# Patient Record
Sex: FEMALE | Race: WHITE | NOT HISPANIC OR LATINO | Employment: FULL TIME | URBAN - METROPOLITAN AREA
[De-identification: names, ages, dates, MRNs, and addresses within clinical notes are randomized per-mention and may not be internally consistent; named-entity substitution may affect disease eponyms.]

---

## 2022-12-02 ENCOUNTER — HOSPITAL ENCOUNTER (INPATIENT)
Facility: HOSPITAL | Age: 42
LOS: 3 days | Discharge: HOME | DRG: 872 | End: 2022-12-05
Attending: OBSTETRICS & GYNECOLOGY | Admitting: OBSTETRICS & GYNECOLOGY
Payer: OTHER GOVERNMENT

## 2022-12-02 DIAGNOSIS — N70.93 TOA (TUBO-OVARIAN ABSCESS): Primary | ICD-10-CM

## 2022-12-02 PROBLEM — K56.7 ILEUS, UNSPECIFIED: Status: ACTIVE | Noted: 2022-12-02

## 2022-12-02 PROBLEM — I82.422 ACUTE DEEP VEIN THROMBOSIS (DVT) OF ILIAC VEIN OF LEFT LOWER EXTREMITY: Status: ACTIVE | Noted: 2022-12-02

## 2022-12-02 PROBLEM — A41.9 SEPSIS WITHOUT ACUTE ORGAN DYSFUNCTION: Status: ACTIVE | Noted: 2022-12-02

## 2022-12-02 PROBLEM — R03.0 ELEVATED BLOOD PRESSURE READING: Status: ACTIVE | Noted: 2022-12-02

## 2022-12-02 PROBLEM — E87.6 HYPOKALEMIA: Status: ACTIVE | Noted: 2022-12-02

## 2022-12-02 PROBLEM — T83.32XA IUD MIGRATION: Status: ACTIVE | Noted: 2022-12-02

## 2022-12-02 LAB
ALBUMIN SERPL BCP-MCNC: 1.7 G/DL (ref 3.5–5.2)
ALP SERPL-CCNC: 122 U/L (ref 55–135)
ALT SERPL W/O P-5'-P-CCNC: 11 U/L (ref 10–44)
ANION GAP SERPL CALC-SCNC: 12 MMOL/L (ref 8–16)
AST SERPL-CCNC: 11 U/L (ref 10–40)
BASOPHILS NFR BLD: 0 % (ref 0–1.9)
BILIRUB SERPL-MCNC: 0.4 MG/DL (ref 0.1–1)
BUN SERPL-MCNC: 13 MG/DL (ref 6–20)
CALCIUM SERPL-MCNC: 8.4 MG/DL (ref 8.7–10.5)
CHLORIDE SERPL-SCNC: 98 MMOL/L (ref 95–110)
CO2 SERPL-SCNC: 24 MMOL/L (ref 23–29)
CREAT SERPL-MCNC: 0.6 MG/DL (ref 0.5–1.4)
DIFFERENTIAL METHOD: ABNORMAL
EOSINOPHIL NFR BLD: 0 % (ref 0–8)
ERYTHROCYTE [DISTWIDTH] IN BLOOD BY AUTOMATED COUNT: 15.5 % (ref 11.5–14.5)
EST. GFR  (NO RACE VARIABLE): >60 ML/MIN/1.73 M^2
GLUCOSE SERPL-MCNC: 146 MG/DL (ref 70–110)
HBV SURFACE AG SERPL QL IA: NORMAL
HCT VFR BLD AUTO: 32.7 % (ref 37–48.5)
HCV AB SERPL QL IA: NEGATIVE
HGB BLD-MCNC: 10.5 G/DL (ref 12–16)
HIV 1+2 AB+HIV1 P24 AG SERPL QL IA: NEGATIVE
IMM GRANULOCYTES # BLD AUTO: ABNORMAL K/UL (ref 0–0.04)
IMM GRANULOCYTES NFR BLD AUTO: ABNORMAL % (ref 0–0.5)
LYMPHOCYTES NFR BLD: 7 % (ref 18–48)
MAGNESIUM SERPL-MCNC: 1.9 MG/DL (ref 1.6–2.6)
MCH RBC QN AUTO: 24.1 PG (ref 27–31)
MCHC RBC AUTO-ENTMCNC: 32.1 G/DL (ref 32–36)
MCV RBC AUTO: 75 FL (ref 82–98)
MONOCYTES NFR BLD: 2 % (ref 4–15)
NEUTROPHILS NFR BLD: 67 % (ref 38–73)
NEUTS BAND NFR BLD MANUAL: 24 %
NRBC BLD-RTO: 0 /100 WBC
PLATELET # BLD AUTO: 286 K/UL (ref 150–450)
PLATELET BLD QL SMEAR: ABNORMAL
PMV BLD AUTO: 11.5 FL (ref 9.2–12.9)
POTASSIUM SERPL-SCNC: 3.3 MMOL/L (ref 3.5–5.1)
PROT SERPL-MCNC: 5.5 G/DL (ref 6–8.4)
RBC # BLD AUTO: 4.36 M/UL (ref 4–5.4)
RPR SER QL: NORMAL
SODIUM SERPL-SCNC: 134 MMOL/L (ref 136–145)
WBC # BLD AUTO: 26.5 K/UL (ref 3.9–12.7)

## 2022-12-02 PROCEDURE — 80053 COMPREHEN METABOLIC PANEL: CPT | Performed by: OBSTETRICS & GYNECOLOGY

## 2022-12-02 PROCEDURE — 25000003 PHARM REV CODE 250: Performed by: OBSTETRICS & GYNECOLOGY

## 2022-12-02 PROCEDURE — 11000001 HC ACUTE MED/SURG PRIVATE ROOM

## 2022-12-02 PROCEDURE — 63600175 PHARM REV CODE 636 W HCPCS: Performed by: NURSE PRACTITIONER

## 2022-12-02 PROCEDURE — 25500020 PHARM REV CODE 255: Performed by: OBSTETRICS & GYNECOLOGY

## 2022-12-02 PROCEDURE — 36415 COLL VENOUS BLD VENIPUNCTURE: CPT | Performed by: OBSTETRICS & GYNECOLOGY

## 2022-12-02 PROCEDURE — 99223 1ST HOSP IP/OBS HIGH 75: CPT | Mod: ,,, | Performed by: OBSTETRICS & GYNECOLOGY

## 2022-12-02 PROCEDURE — 87389 HIV-1 AG W/HIV-1&-2 AB AG IA: CPT | Performed by: OBSTETRICS & GYNECOLOGY

## 2022-12-02 PROCEDURE — 86803 HEPATITIS C AB TEST: CPT | Performed by: OBSTETRICS & GYNECOLOGY

## 2022-12-02 PROCEDURE — 87340 HEPATITIS B SURFACE AG IA: CPT | Performed by: OBSTETRICS & GYNECOLOGY

## 2022-12-02 PROCEDURE — 85027 COMPLETE CBC AUTOMATED: CPT | Performed by: OBSTETRICS & GYNECOLOGY

## 2022-12-02 PROCEDURE — 99223 PR INITIAL HOSPITAL CARE,LEVL III: ICD-10-PCS | Mod: ,,, | Performed by: OBSTETRICS & GYNECOLOGY

## 2022-12-02 PROCEDURE — 86592 SYPHILIS TEST NON-TREP QUAL: CPT | Performed by: OBSTETRICS & GYNECOLOGY

## 2022-12-02 PROCEDURE — 83735 ASSAY OF MAGNESIUM: CPT | Performed by: OBSTETRICS & GYNECOLOGY

## 2022-12-02 PROCEDURE — S0030 INJECTION, METRONIDAZOLE: HCPCS | Performed by: OBSTETRICS & GYNECOLOGY

## 2022-12-02 PROCEDURE — 63600175 PHARM REV CODE 636 W HCPCS: Performed by: OBSTETRICS & GYNECOLOGY

## 2022-12-02 PROCEDURE — 85007 BL SMEAR W/DIFF WBC COUNT: CPT | Performed by: OBSTETRICS & GYNECOLOGY

## 2022-12-02 RX ORDER — DULOXETIN HYDROCHLORIDE 20 MG/1
20 CAPSULE, DELAYED RELEASE ORAL 2 TIMES DAILY
Status: DISCONTINUED | OUTPATIENT
Start: 2022-12-02 | End: 2022-12-05 | Stop reason: HOSPADM

## 2022-12-02 RX ORDER — ENOXAPARIN SODIUM 100 MG/ML
60 INJECTION SUBCUTANEOUS EVERY 12 HOURS
Status: DISCONTINUED | OUTPATIENT
Start: 2022-12-02 | End: 2022-12-02

## 2022-12-02 RX ORDER — HYDROMORPHONE HYDROCHLORIDE 2 MG/ML
0.5 INJECTION, SOLUTION INTRAMUSCULAR; INTRAVENOUS; SUBCUTANEOUS EVERY 4 HOURS PRN
Status: DISCONTINUED | OUTPATIENT
Start: 2022-12-02 | End: 2022-12-04

## 2022-12-02 RX ORDER — HYDROMORPHONE HYDROCHLORIDE 2 MG/ML
1 INJECTION, SOLUTION INTRAMUSCULAR; INTRAVENOUS; SUBCUTANEOUS EVERY 4 HOURS PRN
Status: DISCONTINUED | OUTPATIENT
Start: 2022-12-02 | End: 2022-12-04

## 2022-12-02 RX ORDER — ENOXAPARIN SODIUM 100 MG/ML
1 INJECTION SUBCUTANEOUS
Status: DISCONTINUED | OUTPATIENT
Start: 2022-12-02 | End: 2022-12-05 | Stop reason: HOSPADM

## 2022-12-02 RX ORDER — METRONIDAZOLE 500 MG/100ML
500 INJECTION, SOLUTION INTRAVENOUS
Status: DISCONTINUED | OUTPATIENT
Start: 2022-12-02 | End: 2022-12-05 | Stop reason: HOSPADM

## 2022-12-02 RX ORDER — ONDANSETRON 8 MG/1
8 TABLET, ORALLY DISINTEGRATING ORAL EVERY 8 HOURS PRN
Status: DISCONTINUED | OUTPATIENT
Start: 2022-12-02 | End: 2022-12-05 | Stop reason: HOSPADM

## 2022-12-02 RX ORDER — ACETAMINOPHEN 650 MG/1
650 SUPPOSITORY RECTAL EVERY 4 HOURS PRN
Status: DISCONTINUED | OUTPATIENT
Start: 2022-12-02 | End: 2022-12-05 | Stop reason: HOSPADM

## 2022-12-02 RX ORDER — DEXTROSE MONOHYDRATE, SODIUM CHLORIDE, AND POTASSIUM CHLORIDE 50; 1.49; 9 G/1000ML; G/1000ML; G/1000ML
INJECTION, SOLUTION INTRAVENOUS CONTINUOUS
Status: DISCONTINUED | OUTPATIENT
Start: 2022-12-02 | End: 2022-12-04

## 2022-12-02 RX ORDER — SODIUM CHLORIDE 0.9 % (FLUSH) 0.9 %
10 SYRINGE (ML) INJECTION
Status: DISCONTINUED | OUTPATIENT
Start: 2022-12-02 | End: 2022-12-05 | Stop reason: HOSPADM

## 2022-12-02 RX ORDER — PROCHLORPERAZINE EDISYLATE 5 MG/ML
5 INJECTION INTRAMUSCULAR; INTRAVENOUS EVERY 6 HOURS PRN
Status: DISCONTINUED | OUTPATIENT
Start: 2022-12-02 | End: 2022-12-05 | Stop reason: HOSPADM

## 2022-12-02 RX ADMIN — DULOXETINE HYDROCHLORIDE 20 MG: 20 CAPSULE, DELAYED RELEASE ORAL at 08:12

## 2022-12-02 RX ADMIN — DULOXETINE HYDROCHLORIDE 20 MG: 20 CAPSULE, DELAYED RELEASE ORAL at 09:12

## 2022-12-02 RX ADMIN — DOXYCYCLINE 100 MG: 100 INJECTION, POWDER, LYOPHILIZED, FOR SOLUTION INTRAVENOUS at 09:12

## 2022-12-02 RX ADMIN — METRONIDAZOLE 500 MG: 5 INJECTION, SOLUTION INTRAVENOUS at 05:12

## 2022-12-02 RX ADMIN — HYDROMORPHONE HYDROCHLORIDE 1 MG: 2 INJECTION INTRAMUSCULAR; INTRAVENOUS; SUBCUTANEOUS at 03:12

## 2022-12-02 RX ADMIN — DEXTROSE, SODIUM CHLORIDE, AND POTASSIUM CHLORIDE: 5; .9; .15 INJECTION INTRAVENOUS at 06:12

## 2022-12-02 RX ADMIN — DEXTROSE, SODIUM CHLORIDE, AND POTASSIUM CHLORIDE: 5; .9; .15 INJECTION INTRAVENOUS at 03:12

## 2022-12-02 RX ADMIN — ENOXAPARIN SODIUM 70 MG: 100 INJECTION SUBCUTANEOUS at 09:12

## 2022-12-02 RX ADMIN — METRONIDAZOLE 500 MG: 5 INJECTION, SOLUTION INTRAVENOUS at 07:12

## 2022-12-02 RX ADMIN — CEFTRIAXONE 1 G: 1 INJECTION, SOLUTION INTRAVENOUS at 06:12

## 2022-12-02 RX ADMIN — IOHEXOL 100 ML: 350 INJECTION, SOLUTION INTRAVENOUS at 09:12

## 2022-12-02 RX ADMIN — ENOXAPARIN SODIUM 70 MG: 100 INJECTION SUBCUTANEOUS at 08:12

## 2022-12-02 RX ADMIN — IOHEXOL 30 ML: 350 INJECTION, SOLUTION INTRAVENOUS at 09:12

## 2022-12-02 RX ADMIN — HYDROMORPHONE HYDROCHLORIDE 1 MG: 2 INJECTION INTRAMUSCULAR; INTRAVENOUS; SUBCUTANEOUS at 09:12

## 2022-12-02 RX ADMIN — HYDROMORPHONE HYDROCHLORIDE 1 MG: 2 INJECTION INTRAMUSCULAR; INTRAVENOUS; SUBCUTANEOUS at 05:12

## 2022-12-02 NOTE — H&P
[facility]  Obstetrics & Gynecology  History & Physical    Patient Name: Amy Womack  MRN: [id number]  Admission Date: 2022  Primary Care Provider: Primary Doctor No    Subjective:     Chief Complaint/Reason for Admission: TOA    History of Present Illness:  43 y/o  presents to our hospital as a transfer from [place] ED.  Pt presented there on 22.  Symptoms started on  with nausea/vomiting and loose stools.  Was around some friends whose children had similar symptoms.  Over the next few days, her abdomen became progressively more distended, then started having pelvic pain (LLQ>RLQ) and fevers and chills.  Wednesday morning, she developed significant left groin and left upper thigh burning pain, so presented to the ED.  Work-up in the ED revealed a leukocytosis (WBC's 26.2), mild hypokalemia, and CT Abd/pelvis with multiple findings, but most significantly:  1. bilateral TOA's (3.6 x 7.2 cm on the right, and 6.6 x 7.5 cm on the left)  2. IUD low in the uterus  3. left hydrouretronephrosis likely due to mass effect from abscesses  4. Questionable LUQ intussusception with either SBO vs developing ileus.  5. Potential thrombus in left common and external iliac veins and left common femoral vein    Pelvic ultrasound: bilateral TOA's; IUD located within the cervix  LE Dopplers: DVT in left external iliac vein    Gyn hx significant for Mirena IUD in place for 6 years.  Achieved amenorrhea with it until 1.5 years ago, then has had regular, monthly periods since then.  Started having some light bleeding today.  Pt was sexually active with her boyfriend of 4 years, but they broke up a few months ago.  Not sexually active since then.  Does not suspect any infidelity prior to their break-up.  GC/CT from [place] pending.            OB History    Para Term  AB Living   2 1 1 0 1 1   SAB IAB Ectopic Multiple Live Births   0 0 1 0 1      # Outcome Date GA Lbr  Eladio/2nd Weight Sex Delivery Anes PTL Lv   2 Term     M Vag-Spont   CHAYO   1 Ectopic               Birth Comments: treated with MTX     Past Medical History:   Diagnosis Date    Hypertension      Past Surgical History:   Procedure Laterality Date    AUGMENTATION OF BREAST Bilateral     LIPOSUCTION         PTA Medications   Medication Sig    duloxetine HCl (CYMBALTA ORAL) Take by mouth.    semaglutide (OZEMPIC SUBQ) Inject into the skin.       Review of patient's allergies indicates:  No Known Allergies     Family History    None       Tobacco Use    Smoking status: Never    Smokeless tobacco: Never   Substance and Sexual Activity    Alcohol use: Not Currently    Drug use: Never    Sexual activity: Not Currently     Partners: Male     Birth control/protection: I.U.D.     Review of Systems   Constitutional:  Positive for chills, diaphoresis, fatigue and fever. Negative for activity change and unexpected weight change.   Gastrointestinal:  Positive for abdominal pain (mainly in the lower abdomen, but will have intermittent upper abdominal pain), bloating, diarrhea (some loose stool earlier this week, but no flatus the last few days until small amount last night), nausea and vomiting (lasted vomited on Tuesday). Negative for constipation.   Endocrine: Negative for hair loss and hot flashes.   Genitourinary:  Positive for pelvic pain and vaginal bleeding (started with some light bleeding today). Negative for dysmenorrhea, dyspareunia, dysuria, frequency, genital sores, hematuria, menorrhagia, menstrual problem, urgency, vaginal discharge, vaginal pain and vaginal odor.   Integumentary:  Negative for rash and hair changes.   Hematological:  Negative for adenopathy.    Objective:     Vital Signs (Most Recent):  Temp: 98.5 °F (36.9 °C) (12/02/22 0043)  Pulse: 105 (12/02/22 0043)  Resp: 18 (12/02/22 0043)  BP: (!) 162/94 (12/02/22 0043)  SpO2: 97 % (12/02/22 0043)   Vital Signs (24h Range):  Temp:  [97.9 °F (36.6  °C)-98.7 °F (37.1 °C)] 98.5 °F (36.9 °C)  Pulse:  [105-122] 105  Resp:  [16-36] 18  SpO2:  [94 %-98 %] 97 %  BP: (117-162)/(60-98) 162/94     Weight: 69.2 kg (152 lb 8.9 oz)  Body mass index is 30.81 kg/m².    No LMP recorded.    Physical Exam:   Constitutional: She is oriented to person, place, and time. She appears well-developed and well-nourished. No distress.       Cardiovascular:  Regular rhythm.            Slightly tachycardic    Pulmonary/Chest: Effort normal and breath sounds normal. No respiratory distress. She has no wheezes. She has no rales.        Abdominal: Soft. She exhibits distension (slight distension noted). She exhibits no mass. There is abdominal tenderness (no upper abdominal TTP; has diffuse lower abdominal TTP). There is guarding (with TTP in the lower abdomen; does not guard with palpation in the upper abdomen). There is no rebound. Hernia confirmed negative in the right inguinal area and confirmed negative in the left inguinal area.   Diminished bowel sounds throughout, but hypoactive bowel sounds heard in BL LQ's     Genitourinary:    Pelvic exam was performed with patient supine.   There is no rash, tenderness, lesion or injury on the right labia. There is no rash, tenderness, lesion or injury on the left labia. Right adnexum displays mass (diffuse pelvic TTP on exam with adnexal fullness bilaterally extending almost to the level of the umbilicus) and tenderness. Right adnexum displays no fullness. Left adnexum displays mass and tenderness. Left adnexum displays no fullness. There is bleeding (small amount of vaginal bleeding present) in the vagina. No erythema,  no vaginal discharge, tenderness, rectocele, cystocele or unspecified prolapse of vaginal walls in the vagina.    No foreign body in the vagina.      No signs of injury in the vagina.   Cervix exhibits discharge (blood, but no purulent cervical drainage). Cervix exhibits no motion tenderness (no CMT), no friability and no  tenderness. Uterus is tender. Uterus is not deviated, not enlarged and not fixed.    Genitourinary Comments: IUD strings long, and tip of device visualized at the external os  With pt's verbal consent, IUD was removed by grasping strings with ringed forceps and gently pulling.  The entire IUD was easily removed   IUD strings visualized.              Neurological: She is alert and oriented to person, place, and time.     Psychiatric: She has a normal mood and affect.     Laboratory:  Recent Lab Results         12/01/22 1929 12/01/22  0422        Albumin 1.9   1.8       Alkaline Phosphatase 94   85       ALT 16   15       Anion Gap 12   13       Aniso Slight         AST 12   14       Bands 3.0         Baso #   0.16       Basophil % 0.0   0.6       BILIRUBIN TOTAL 0.4  Comment: For infants and newborns, interpretation of results should be based  on gestational age, weight and in agreement with clinical  observations.    Premature Infant recommended reference ranges:  Up to 24 hours.............<8.0 mg/dL  Up to 48 hours............<12.0 mg/dL  3-5 days..................<15.0 mg/dL  6-29 days.................<15.0 mg/dL     0.4  Comment: For infants and newborns, interpretation of results should be based  on gestational age, weight and in agreement with clinical  observations.    Premature Infant recommended reference ranges:  Up to 24 hours.............<8.0 mg/dL  Up to 48 hours............<12.0 mg/dL  3-5 days..................<15.0 mg/dL  6-29 days.................<15.0 mg/dL         BUN 13   14       Calcium 8.2   8.5       Chloride 98   102       CO2 24   20       Creatinine 0.7   0.7       Differential Method Automated   Automated       eGFR >60.0   >60.0       Eos #   0.0       Eosinophil % 0.0   0.0       Glucose 131   137       Gran # (ANC)   22.0       Gran % 91.0   87.6       Hematocrit 39.1   35.1       Hemoglobin 12.3   11.3       Immature Grans (Abs) CANCELED  Comment: Mild elevation in immature  granulocytes is non specific and   can be seen in a variety of conditions including stress response,   acute inflammation, trauma and pregnancy. Correlation with other   laboratory and clinical findings is essential.    Result canceled by the ancillary.     0.37  Comment: Mild elevation in immature granulocytes is non specific and   can be seen in a variety of conditions including stress response,   acute inflammation, trauma and pregnancy. Correlation with other   laboratory and clinical findings is essential.         Immature Granulocytes CANCELED  Comment: Result canceled by the ancillary.   1.5       Lactate, Kieran 1.6  Comment: Falsely low lactic acid results can be found in samples   containing >=13.0 mg/dL total bilirubin and/or >=3.5 mg/dL   direct bilirubin.     1.2  Comment: Falsely low lactic acid results can be found in samples   containing >=13.0 mg/dL total bilirubin and/or >=3.5 mg/dL   direct bilirubin.         Lymph #   1.2       Lymph % 3.0   4.6       MCH 24.5   24.7       MCHC 31.5   32.2       MCV 78   77       Mono #   1.4       Mono % 3.0   5.7       MPV 11.4   11.5       nRBC 0   0       Platelet Estimate Appears normal         Platelets 256   235       Poikilocytosis Slight         Potassium 3.4   3.7       PROTEIN TOTAL 6.1   5.7       RBC 5.02   4.58       RDW 15.9   15.7       Sodium 134   135       WBC 26.04   25.17               Diagnostic Results:  Labs: Reviewed  US: Reviewed  CT: Reviewed    Assessment/Plan:     * TOA (tubo-ovarian abscess)  -Suspected TOA based on exam findings, imaging, and labs with likely resulting ileus.  Admit to gyn.  Continue IV rocephin, flagyl, and doxycycline (these were started the evening of 11/30/22)  -F/u GC/CT  -HIV, syphillis, and hepatitis screening with AM labs  -Explained that we typically treat for 48-72 hours with IV antibiotics first, and that surgery would be indicated if pt does not respond.  Discussed possible need for hysterectomy with  removal of both tubes, and possibly both ovaries.  Pt states she has completed childbearing      Hypokalemia  Likely due to GI losses; replace through IV fluids and repeat lab in the AM    Sepsis without acute organ dysfunction  Due to TOA's  Continue broad spectrum abx    Acute deep vein thrombosis (DVT) of iliac vein of left lower extremity  Pt has already received lovenox 60mg bid, last dose was 8:50 pm.  -consult hospital medicine for assistance with management  -explained that if she needs surgery, anticoagulation will need to be held, and will need to consider IVC filter placement    IUD migration  IUD located within the cervix.  This is likely why pt started having some vaginal bleeding.  Pt has been on broad spectrum abx x 24 hours prior to arrival.  Discussed removing IUD (migrated and ), and pt agrees.  IUD easily removed, RN chaperone present    Ileus, unspecified  Questionable intussusseption vs SBO, possible early ileus.  I discussed pt's case with Dr. Rick with surgery prior to pt's arrival.  At this point I don't think pt needs urgent surgery, but will plan to reimage with contrast  -keep npo for now except for ice chips          Dana Ledesma MD  Obstetrics & Gynecology  [facility]

## 2022-12-02 NOTE — SUBJECTIVE & OBJECTIVE
Past Medical History:   Diagnosis Date    Hypertension        Past Surgical History:   Procedure Laterality Date    AUGMENTATION OF BREAST Bilateral     LIPOSUCTION         Review of patient's allergies indicates:  No Known Allergies    Current Facility-Administered Medications on File Prior to Encounter   Medication    [COMPLETED] HYDROmorphone injection 1 mg    [COMPLETED] HYDROmorphone injection 1 mg    [COMPLETED] ondansetron injection 4 mg    [COMPLETED] ondansetron injection 4 mg    [DISCONTINUED] cefTRIAXone (ROCEPHIN) 1 g/50 mL D5W IVPB    [DISCONTINUED] doxycycline (VIBRAMYCIN) 100 mg in dextrose 5 % 250 mL IVPB    [DISCONTINUED] DULoxetine DR capsule 20 mg    [DISCONTINUED] enoxaparin injection 60 mg    [DISCONTINUED] iohexoL (OMNIPAQUE 350) injection 75 mL    [DISCONTINUED] metronidazole IVPB 500 mg     Current Outpatient Medications on File Prior to Encounter   Medication Sig    duloxetine HCl (CYMBALTA ORAL) Take by mouth.    semaglutide (OZEMPIC SUBQ) Inject into the skin.     Family History    None       Tobacco Use    Smoking status: Never    Smokeless tobacco: Never   Substance and Sexual Activity    Alcohol use: Not Currently    Drug use: Never    Sexual activity: Not Currently     Partners: Male     Birth control/protection: I.U.D.     Review of Systems   Constitutional:  Negative for chills, fatigue and fever.   Gastrointestinal:  Positive for abdominal pain. Negative for constipation, diarrhea, nausea and vomiting.   Genitourinary:  Positive for pelvic pain. Negative for difficulty urinating, flank pain, frequency, hematuria, urgency, vaginal bleeding, vaginal discharge and vaginal pain.   All other systems reviewed and are negative.  Objective:     Vital Signs (Most Recent):  Temp: 98.5 °F (36.9 °C) (12/02/22 0452)  Pulse: 105 (12/02/22 0452)  Resp: 16 (12/02/22 0524)  BP: 132/76 (12/02/22 0452)  SpO2: 95 % (12/02/22 0452) Vital Signs (24h Range):  Temp:  [97.9 °F (36.6 °C)-98.5 °F (36.9 °C)]  98.5 °F (36.9 °C)  Pulse:  [105-122] 105  Resp:  [16-36] 16  SpO2:  [94 %-98 %] 95 %  BP: (132-162)/(67-98) 132/76     Weight: 69.2 kg (152 lb 8.9 oz)  Body mass index is 30.81 kg/m².    Physical Exam  Vitals and nursing note reviewed.   Constitutional:       General: She is awake. She is not in acute distress.     Appearance: Normal appearance. She is well-developed and well-groomed. She is not ill-appearing, toxic-appearing or diaphoretic.   HENT:      Head: Normocephalic and atraumatic.      Mouth/Throat:      Lips: Pink.      Mouth: Mucous membranes are moist.      Pharynx: Oropharynx is clear. Uvula midline.   Eyes:      Extraocular Movements: Extraocular movements intact.      Conjunctiva/sclera: Conjunctivae normal.      Pupils: Pupils are equal, round, and reactive to light.   Cardiovascular:      Rate and Rhythm: Normal rate and regular rhythm.      Heart sounds: Normal heart sounds. No murmur heard.  Pulmonary:      Effort: Pulmonary effort is normal.      Breath sounds: Normal breath sounds.   Abdominal:      General: Bowel sounds are normal.      Palpations: Abdomen is soft.      Tenderness: There is abdominal tenderness in the right lower quadrant, periumbilical area, suprapubic area and left lower quadrant.   Musculoskeletal:      Cervical back: Normal range of motion and neck supple.      Comments: 5/5 strength throughout   Skin:     General: Skin is warm and dry.      Capillary Refill: Capillary refill takes less than 2 seconds.   Neurological:      Mental Status: She is alert and oriented to person, place, and time. Mental status is at baseline.      GCS: GCS eye subscore is 4. GCS verbal subscore is 5. GCS motor subscore is 6.      Cranial Nerves: Cranial nerves 2-12 are intact.      Sensory: Sensation is intact.      Motor: Motor function is intact.      Deep Tendon Reflexes: Reflexes are normal and symmetric.   Psychiatric:         Mood and Affect: Mood normal.         Speech: Speech normal.          Behavior: Behavior normal. Behavior is cooperative.     LABS:  Recent Results (from the past 24 hour(s))   CBC auto differential    Collection Time: 12/01/22  7:29 PM   Result Value Ref Range    WBC 26.04 (H) 3.90 - 12.70 K/uL    RBC 5.02 4.00 - 5.40 M/uL    Hemoglobin 12.3 12.0 - 16.0 g/dL    Hematocrit 39.1 37.0 - 48.5 %    MCV 78 (L) 82 - 98 fL    MCH 24.5 (L) 27.0 - 31.0 pg    MCHC 31.5 (L) 32.0 - 36.0 g/dL    RDW 15.9 (H) 11.5 - 14.5 %    Platelets 256 150 - 450 K/uL    MPV 11.4 9.2 - 12.9 fL    Immature Granulocytes CANCELED 0.0 - 0.5 %    Immature Grans (Abs) CANCELED 0.00 - 0.04 K/uL    nRBC 0 0 /100 WBC    Gran % 91.0 (H) 38.0 - 73.0 %    Lymph % 3.0 (L) 18.0 - 48.0 %    Mono % 3.0 (L) 4.0 - 15.0 %    Eosinophil % 0.0 0.0 - 8.0 %    Basophil % 0.0 0.0 - 1.9 %    Bands 3.0 %    Platelet Estimate Appears normal     Aniso Slight     Poik Slight     Differential Method Automated    Comprehensive metabolic panel    Collection Time: 12/01/22  7:29 PM   Result Value Ref Range    Sodium 134 (L) 136 - 145 mmol/L    Potassium 3.4 (L) 3.5 - 5.1 mmol/L    Chloride 98 95 - 110 mmol/L    CO2 24 23 - 29 mmol/L    Glucose 131 (H) 70 - 110 mg/dL    BUN 13 6 - 20 mg/dL    Creatinine 0.7 0.5 - 1.4 mg/dL    Calcium 8.2 (L) 8.7 - 10.5 mg/dL    Total Protein 6.1 6.0 - 8.4 g/dL    Albumin 1.9 (L) 3.5 - 5.2 g/dL    Total Bilirubin 0.4 0.1 - 1.0 mg/dL    Alkaline Phosphatase 94 55 - 135 U/L    AST 12 10 - 40 U/L    ALT 16 10 - 44 U/L    Anion Gap 12 8 - 16 mmol/L    eGFR >60.0 >60 mL/min/1.73 m^2   Lactic acid, plasma    Collection Time: 12/01/22  7:29 PM   Result Value Ref Range    Lactate (Lactic Acid) 1.6 0.5 - 2.2 mmol/L   CBC auto differential    Collection Time: 12/02/22  4:44 AM   Result Value Ref Range    WBC 26.50 (H) 3.90 - 12.70 K/uL    RBC 4.36 4.00 - 5.40 M/uL    Hemoglobin 10.5 (L) 12.0 - 16.0 g/dL    Hematocrit 32.7 (L) 37.0 - 48.5 %    MCV 75 (L) 82 - 98 fL    MCH 24.1 (L) 27.0 - 31.0 pg    MCHC 32.1 32.0 -  36.0 g/dL    RDW 15.5 (H) 11.5 - 14.5 %    Platelets 286 150 - 450 K/uL    MPV 11.5 9.2 - 12.9 fL   Comprehensive metabolic panel    Collection Time: 12/02/22  4:44 AM   Result Value Ref Range    Sodium 134 (L) 136 - 145 mmol/L    Potassium 3.3 (L) 3.5 - 5.1 mmol/L    Chloride 98 95 - 110 mmol/L    CO2 24 23 - 29 mmol/L    Glucose 146 (H) 70 - 110 mg/dL    BUN 13 6 - 20 mg/dL    Creatinine 0.6 0.5 - 1.4 mg/dL    Calcium 8.4 (L) 8.7 - 10.5 mg/dL    Total Protein 5.5 (L) 6.0 - 8.4 g/dL    Albumin 1.7 (L) 3.5 - 5.2 g/dL    Total Bilirubin 0.4 0.1 - 1.0 mg/dL    Alkaline Phosphatase 122 55 - 135 U/L    AST 11 10 - 40 U/L    ALT 11 10 - 44 U/L    Anion Gap 12 8 - 16 mmol/L    eGFR >60 >60 mL/min/1.73 m^2   Hepatitis C antibody    Collection Time: 12/02/22  4:44 AM   Result Value Ref Range    Hepatitis C Ab Negative Negative   HIV 1/2 Ag/Ab (4th Gen)    Collection Time: 12/02/22  4:44 AM   Result Value Ref Range    HIV 1/2 Ag/Ab Negative Negative   Magnesium    Collection Time: 12/02/22  4:44 AM   Result Value Ref Range    Magnesium 1.9 1.6 - 2.6 mg/dL       RADIOLOGY  CT Abdomen Pelvis W WO Contrast    Result Date: 11/30/2022  EXAMINATION: CT ABDOMEN PELVIS W WO CONTRAST CLINICAL HISTORY: Flank pain, kidney stone suspected;LLQ abdominal pain; TECHNIQUE: Low dose axial images, sagittal and coronal reformations were obtained from the lung bases to the pubic symphysis before and following the IV administration of 75 mL of Omnipaque 350.  No oral contrast was given. COMPARISON: None. FINDINGS: Imaged lung bases show minimal dependent atelectasis and minimal scattered platelike scarring versus atelectasis.  No consolidation or pleural effusion. Partially imaged bilateral breast implants. Base of the heart is within normal limits. Liver is upper limits of normal in size without focal process seen.  Gallbladder is normally distended with layering hyperattenuation suggesting sludge without acute cholecystitis.  Spleen is normal  in size without focal process seen.  There are 2 suspected accessory splenules adjacent to the lower aspect of the spleen at the left mid to upper abdomen.  Pancreas is within normal limits.  There is non masslike thickening of the bilateral adrenal glands. Small hiatal hernia.  Stomach and duodenum are within normal limits. There is short-segment (approximate 5 cm length) small bowel into small bowel intussusception noted in the left upper quadrant which slightly changes in configuration from the pre to postcontrast images suggesting that it may be transient, although did not completely resolved on the slightly delayed postcontrast images.  The small bowel loops proximal to the intussusception are not significantly distended.  There is a moderate segmental length small-bowel just distal to the intussusception within the midline and right mid to upper abdomen (best seen on axial image 53 of series 6), which is distended with air-fluid level up to 2.7 cm, noting a transition point in the medial right mid abdomen best seen on axial image 72 of series 6.  This could reflect peristalsis; however, developing ileus versus small-bowel obstruction not excluded.  Within the mid to lower abdomen and upper pelvis, there are additional multiple prominent but not pathologically dilated, mostly fluid-filled small bowel loops with suspected transition point seen in the right mid abdomen anteriorly best seen on axial image 79 of series 6 and coronal image 31 of series 7.  Small bowel distal to this including the distal ileum and terminal ileum are relatively decompressed.  No significant wall thickening of the terminal ileum. Appendix is within normal limits.  No significant colonic distension.  There is however abnormal circumferential wall thickening involving the mid to distal descending colon and proximal sigmoid colon within the left lower quadrant and left pelvis.  Rectum is normal in caliber.  No discrete enhancing mass  within the small bowel or large bowel at this time.  No pneumatosis or portal venous gas. There is small volume likely reactive free fluid tracking along the left pericolic gutter to the mesenteric root and also pelvis, left more so than right.  There is stranding throughout the pelvis including surrounding majority of the uterus and bilateral adnexa, left more so than right.  There is also asymmetric left more than right perirectal stranding without definite significant rectal wall thickening, likely reactive.  There is small volume presacral edema as well. Numerous scattered subcentimeter mesenteric and retroperitoneal lymph nodes.  There are several prominent and also mildly enlarged lymph nodes along the left external iliac chain at the level of the acetabulum measuring up to 10 mm in maximum short axis.  There several subcentimeter bilateral inguinal lymph nodes, slightly more in number and larger in size on the left. Bilateral kidneys are normal in overall size, shape and location.  There is delayed nephrogram on the left.  No right hydronephrosis or significant perinephric stranding.  There is subtle stranding noted about the mid to lower left renal pole.  There is mild to moderate left-sided hydronephrosis as well as dilatation of the left ureter to the level of the upper pelvis likely secondary to mass effect from left adnexal mass described below.  Right ureter is normal in caliber but mildly displaced laterally and posteriorly secondary to right adnexal mass and rightward shift of the uterus. Urinary bladder is well distended noting wall thickening primarily along the left aspect.  There is also stranding about the urinary bladder particularly along the left side.  There is mass effect upon the left and posterior aspect of the urinary bladder secondary to the uterus and left adnexal mass. Uterus is anteflexed and tilted towards the right.  There is a T-shaped metallic IUD which appears somewhat low in  position at the mid to lower uterine segment and somewhat tilted within the uterine cavity suggesting abnormal positioning. There are heterogeneous but mostly low-density multiloculated appearing masses at the bilateral adnexa, left more so than right, with portions appearing tubular on the left and also potentially on the right although to a lesser degree.  The mass on the right measures 3.6 x 7.2 cm, with its largest cystic component measuring 4.1 cm.  The mass on the left measures up to 6.6 x 7.5 cm with largest cystic component measuring 6.5 cm in maximum transaxial dimension.  There is surrounding induration/inflammatory change, left more so than right, with mass effect upon the majority of the uterus particularly along the posterior aspect.  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  Less likely consideration given the degree of inflammation would be endometriomas and/or complex/hemorrhagic cysts.  Ovarian cystic neoplastic process could present similarly but considered less likely given the overall findings. There is heterogeneous and slight decreased attenuation involving the majority of the left common and external iliac veins, left common femoral vein and proximal portion of the left femoral vein with surrounding fat stranding, noting timing of contrast bolus is not tailored for optimal evaluation of the venous system as the initial images were pre contrast and the 2nd set of images were post contrast within the arterial system. No upper abdominal ascites.  No free air. No significant atherosclerosis.  No aortic aneurysm or dissection. Osseous structures show chronic appearing minimal anterior wedge deformity of several mid to lower thoracic vertebral bodies and minimal degenerative change without acute or destructive process seen.     Multi location and multi organ acute pathologic processes: : 1. Left greater  than right diffuse pelvic inflammatory process concerning for sequela of PID, complicated by bilateral adnexal complex cystic masses concerning for pyosalpinx/complex hydrosalpinx versus tubo-ovarian abscesses, with associated regional mass effect and induration/inflammatory change, as further outlined above.  Less likely considerations could include endometriomas versus complex/hemorrhagic cyst with partial rupture and associated inflammatory change, versus cystic ovarian neoplasms.  Clinical correlation and with pelvic exam advised.  Gyn consult may be warranted.  Further with pelvic ultrasound is advised. 2. IUD appears somewhat low in position and somewhat tilted within the mid to lower uterine cavity, some of which may be related to significant mass effect upon the uterus from left larger than right adnexal masses described above. 3. Asymmetric mild to moderate left-sided hydroureteronephrosis to the level of the pelvis with decreased left renal function presumably related to mass effect upon the distal left ureter.  There is lesser degree mild mass effect upon the distal right ureter without associated obstructive uropathy at this time.  Clinical correlation and with urinalysis advised to exclude underlying pyeloureteronephritis. 4. Left more than right urinary bladder wall thickening with adjacent inflammatory change presumably reactive/related to aforementioned pelvic and bilateral adnexal inflammatory process, with separate/non related other infectious/inflammatory cystitis in the differential.  Clinical correlation and with urinalysis advised. GI: 1. Findings concerning for developing small bowel obstruction versus reactive regional ileus, presumably related to aforementioned /pelvic inflammatory process.  There are 2 separate transition points identified.  The more proximal transition point is seen within the medial right mid abdomen (best seen on axial image 72 of series 6), beginning just distal to a  short-segment small bowel into small bowel intussusception which is located more cephalad in the midline and left mid to upper abdomen (best seen on axial image 53 of series 6).  The small bowel intussusception may be transient as this appears to slightly changed in configuration on the delayed postcontrast images.  The more distal 2nd transition point is seen within the anterior right mid to lower abdomen (best seen on axial image 79 of series 6). 2. Mid to distal descending and proximal sigmoid colonic abnormal circumferential wall thickening with adjacent inflammatory change, presumably reactive from adjacent left greater than right pelvic inflammatory process.  A separate unrelated colitis from other infectious or inflammatory process could present similarly but is considered less likely.  No significant colonic distension at this time. Vascular: 1. Findings concerning for potential thrombus/thrombophlebitis within the left common and external iliac veins, left common femoral vein and partially imaged proximal left femoral vein.  Clinical correlation advised.  Further evaluation with left lower extremity venous upper ultrasound to include the iliac vessels is recommended. Miscellaneous: 1. Left pelvic mild lymphadenopathy, likely reactive. 2. Borderline hepatomegaly. 3. Suspected gallbladder sludge. 4. Small hiatal hernia. 5. Few additional incidental findings as above. This report was flagged in Epic as abnormal.  This report was flagged in Epic as containing an incidental finding. Electronically signed by: Vic Cee MD Date:    11/30/2022 Time:    14:11    US Lower Extremity Veins Left    Result Date: 11/30/2022  EXAMINATION: US LOWER EXTREMITY VEINS LEFT CLINICAL HISTORY: Left lower quadrant pain TECHNIQUE: Duplex and color flow Doppler evaluation and graded compression of the left lower extremity veins was performed. COMPARISON: CT abdomen and pelvis earlier same day FINDINGS: Partially imaged left  external iliac vein: No flow seen. Left thigh veins: The common femoral, femoral, popliteal, upper greater saphenous, and deep femoral veins are patent and free of thrombus. The veins are normally compressible and have normal phasic flow and augmentation response. Left calf veins: The visualized calf veins are patent. Contralateral CFV: The contralateral (right) common femoral vein is patent and free of thrombus. Miscellaneous: None     Findings concerning for occlusive DVT within the partially imaged left external iliac vein, correlating with findings on recent cross-sectional imaging earlier same day. No evidence of associated DVT within the left lower extremity at this time. This report was flagged in Epic as abnormal. Electronically signed by: Vic Cee MD Date:    11/30/2022 Time:    16:35    US Pelvis Comp with Transvag NON-OB (xpd    Result Date: 11/30/2022  EXAMINATION: US PELVIS COMP WITH TRANSVAG NON-OB (XPD) CLINICAL HISTORY: possible tubo-ovarian abscess; TECHNIQUE: Transabdominal sonography of the pelvis was performed, followed by transvaginal sonography to better evaluate the uterus and ovaries. COMPARISON: CT of the abdomen and pelvis from 11/30/2022. FINDINGS: Uterus: Size: 10.5 x 5.1 x 6.9 cm. The uterus is anteverted and normal in appearance. Masses: None Endometrium: Normal in this pre menopausal patient, measuring 4 mm. There is a low lying IUD which extends through the cervical os. Right ovary: Size: 6.0 x 4.3 x 5.4 cm Appearance: There is a complex cystic lesion compatible with a tubo-ovarian abscess measuring 4.4 x 3.6 x 3.8 cm.  There is surrounding hypervascularity. Vascular flow: Normal. Left ovary: There is a complex cystic lesion compatible with a tubo-ovarian abscess other 9.0 x 6.2 x 8.1 cm.  Difficult to distinguish the left ovarian tissue from the abscess.  There is surrounding hypervascularity. Vascular Flow: Normal. Free Fluid: Heterogeneous moderate amount of free fluid in the  pelvis.     1. Bilateral tubo-ovarian abscesses, left greater than right. 2. Abnormal positioning of the IUD which is low lying and extends through the cervical os. Electronically signed by: Husam Che Date:    11/30/2022 Time:    17:25      EKG    MICROBIOLOGY    MDM     Amount and/or Complexity of Data Reviewed  Clinical lab tests: reviewed  Tests in the radiology section of CPT®: reviewed  Tests in the medicine section of CPT®: reviewed  Discussion of test results with the performing providers: yes  Decide to obtain previous medical records or to obtain history from someone other than the patient: yes  Review and summarize past medical records: yes  Discuss the patient with other providers: yes  Independent visualization of images, tracings, or specimens: yes

## 2022-12-02 NOTE — SUBJECTIVE & OBJECTIVE
OB History    Para Term  AB Living   2 1 1 0 1 1   SAB IAB Ectopic Multiple Live Births   0 0 1 0 1      # Outcome Date GA Lbr Eladio/2nd Weight Sex Delivery Anes PTL Lv   2 Term     M Vag-Spont   CHAYO   1 Ectopic               Birth Comments: treated with MTX     Past Medical History:   Diagnosis Date    Hypertension      Past Surgical History:   Procedure Laterality Date    AUGMENTATION OF BREAST Bilateral     LIPOSUCTION         PTA Medications   Medication Sig    duloxetine HCl (CYMBALTA ORAL) Take by mouth.    semaglutide (OZEMPIC SUBQ) Inject into the skin.       Review of patient's allergies indicates:  No Known Allergies     Family History    None       Tobacco Use    Smoking status: Never    Smokeless tobacco: Never   Substance and Sexual Activity    Alcohol use: Not Currently    Drug use: Never    Sexual activity: Not Currently     Partners: Male     Birth control/protection: I.U.D.     Review of Systems   Constitutional:  Positive for chills, diaphoresis, fatigue and fever. Negative for activity change and unexpected weight change.   Gastrointestinal:  Positive for abdominal pain (mainly in the lower abdomen, but will have intermittent upper abdominal pain), bloating, diarrhea (some loose stool earlier this week, but no flatus the last few days until small amount last night), nausea and vomiting (lasted vomited on Tuesday). Negative for constipation.   Endocrine: Negative for hair loss and hot flashes.   Genitourinary:  Positive for pelvic pain and vaginal bleeding (started with some light bleeding today). Negative for dysmenorrhea, dyspareunia, dysuria, frequency, genital sores, hematuria, menorrhagia, menstrual problem, urgency, vaginal discharge, vaginal pain and vaginal odor.   Integumentary:  Negative for rash and hair changes.   Hematological:  Negative for adenopathy.    Objective:     Vital Signs (Most Recent):  Temp: 98.5 °F (36.9 °C) (22 0043)  Pulse: 105 (22  0043)  Resp: 18 (12/02/22 0043)  BP: (!) 162/94 (12/02/22 0043)  SpO2: 97 % (12/02/22 0043)   Vital Signs (24h Range):  Temp:  [97.9 °F (36.6 °C)-98.7 °F (37.1 °C)] 98.5 °F (36.9 °C)  Pulse:  [105-122] 105  Resp:  [16-36] 18  SpO2:  [94 %-98 %] 97 %  BP: (117-162)/(60-98) 162/94     Weight: 69.2 kg (152 lb 8.9 oz)  Body mass index is 30.81 kg/m².    No LMP recorded.    Physical Exam:   Constitutional: She is oriented to person, place, and time. She appears well-developed and well-nourished. No distress.       Cardiovascular:  Regular rhythm.            Slightly tachycardic    Pulmonary/Chest: Effort normal and breath sounds normal. No respiratory distress. She has no wheezes. She has no rales.        Abdominal: Soft. She exhibits distension (slight distension noted). She exhibits no mass. There is abdominal tenderness (no upper abdominal TTP; has diffuse lower abdominal TTP). There is guarding (with TTP in the lower abdomen; does not guard with palpation in the upper abdomen). There is no rebound. Hernia confirmed negative in the right inguinal area and confirmed negative in the left inguinal area.   Diminished bowel sounds throughout, but hypoactive bowel sounds heard in BL LQ's     Genitourinary:    Pelvic exam was performed with patient supine.   There is no rash, tenderness, lesion or injury on the right labia. There is no rash, tenderness, lesion or injury on the left labia. Right adnexum displays mass (diffuse pelvic TTP on exam with adnexal fullness bilaterally extending almost to the level of the umbilicus) and tenderness. Right adnexum displays no fullness. Left adnexum displays mass and tenderness. Left adnexum displays no fullness. There is bleeding (small amount of vaginal bleeding present) in the vagina. No erythema,  no vaginal discharge, tenderness, rectocele, cystocele or unspecified prolapse of vaginal walls in the vagina.    No foreign body in the vagina.      No signs of injury in the vagina.    Cervix exhibits discharge (blood, but no purulent cervical drainage). Cervix exhibits no motion tenderness (no CMT), no friability and no tenderness. Uterus is tender. Uterus is not deviated, not enlarged and not fixed.    Genitourinary Comments: IUD strings long, and tip of device visualized at the external os  With pt's verbal consent, IUD was removed by grasping strings with ringed forceps and gently pulling.  The entire IUD was easily removed   IUD strings visualized.              Neurological: She is alert and oriented to person, place, and time.     Psychiatric: She has a normal mood and affect.     Laboratory:  Recent Lab Results         12/01/22 1929 12/01/22  0422        Albumin 1.9   1.8       Alkaline Phosphatase 94   85       ALT 16   15       Anion Gap 12   13       Aniso Slight         AST 12   14       Bands 3.0         Baso #   0.16       Basophil % 0.0   0.6       BILIRUBIN TOTAL 0.4  Comment: For infants and newborns, interpretation of results should be based  on gestational age, weight and in agreement with clinical  observations.    Premature Infant recommended reference ranges:  Up to 24 hours.............<8.0 mg/dL  Up to 48 hours............<12.0 mg/dL  3-5 days..................<15.0 mg/dL  6-29 days.................<15.0 mg/dL     0.4  Comment: For infants and newborns, interpretation of results should be based  on gestational age, weight and in agreement with clinical  observations.    Premature Infant recommended reference ranges:  Up to 24 hours.............<8.0 mg/dL  Up to 48 hours............<12.0 mg/dL  3-5 days..................<15.0 mg/dL  6-29 days.................<15.0 mg/dL         BUN 13   14       Calcium 8.2   8.5       Chloride 98   102       CO2 24   20       Creatinine 0.7   0.7       Differential Method Automated   Automated       eGFR >60.0   >60.0       Eos #   0.0       Eosinophil % 0.0   0.0       Glucose 131   137       Gran # (ANC)   22.0       Gran % 91.0    87.6       Hematocrit 39.1   35.1       Hemoglobin 12.3   11.3       Immature Grans (Abs) CANCELED  Comment: Mild elevation in immature granulocytes is non specific and   can be seen in a variety of conditions including stress response,   acute inflammation, trauma and pregnancy. Correlation with other   laboratory and clinical findings is essential.    Result canceled by the ancillary.     0.37  Comment: Mild elevation in immature granulocytes is non specific and   can be seen in a variety of conditions including stress response,   acute inflammation, trauma and pregnancy. Correlation with other   laboratory and clinical findings is essential.         Immature Granulocytes CANCELED  Comment: Result canceled by the ancillary.   1.5       Lactate, Kieran 1.6  Comment: Falsely low lactic acid results can be found in samples   containing >=13.0 mg/dL total bilirubin and/or >=3.5 mg/dL   direct bilirubin.     1.2  Comment: Falsely low lactic acid results can be found in samples   containing >=13.0 mg/dL total bilirubin and/or >=3.5 mg/dL   direct bilirubin.         Lymph #   1.2       Lymph % 3.0   4.6       MCH 24.5   24.7       MCHC 31.5   32.2       MCV 78   77       Mono #   1.4       Mono % 3.0   5.7       MPV 11.4   11.5       nRBC 0   0       Platelet Estimate Appears normal         Platelets 256   235       Poikilocytosis Slight         Potassium 3.4   3.7       PROTEIN TOTAL 6.1   5.7       RBC 5.02   4.58       RDW 15.9   15.7       Sodium 134   135       WBC 26.04   25.17               Diagnostic Results:  Labs: Reviewed  US: Reviewed  CT: Reviewed

## 2022-12-02 NOTE — CONSULTS
[facility]  Consult Note    Patient Name: Amy Womack  MRN: [id number]  Admission Date: 12/2/2022  Hospital Length of Stay: 0 days  Attending Physician: Dana Ledesma MD   Primary Care Provider: Primary Doctor No           Patient information was obtained from patient, past medical records and ER records.     Consults  Subjective:     Principal Problem: TOA (tubo-ovarian abscess)    Chief Complaint:   Chief Complaint   Patient presents with    Pelvic Pain        HPI: Amy Womack is a 42 y.o. female with a PMH  has a past medical history of Hypertension.  Presented to our facility as a transfer from [facility] for GYN consult for abnormal imaging found at that facility.  Patient presented to that facility on 11/30/2022 for worsening abdominal pain which progressively got worse since its onset on 11/26/2022.  Associated symptoms include nausea and vomiting with loose stools, fever, chills., sweats.  Patient also states she is been having burning sensation in the pelvic region left greater than right.  Patient currently rates pain 3/10.  At its worst 10/10.  Alleviating factors include pain medication.  ER workup performed at that facility was noted to have leukocytosis of 26.2.  CT imaging of abdomen and pelvis revealed:[1.  Bilateral TIAs, 2. IUD low and uterus, 3.  Left hydroureteronephrosis likely secondary to mass effect from abscesses, 4.  Questionable left upper quadrant intussusception with it a small-bowel obstruction versus developing ileus, 5.  Potential thrombus in left common and external iliac veins and left common femoral vein].  Bilateral lower extremity Dopplers revealed DVT and left external iliac vein.  Patient was initiated on Lovenox for DVT as well as Rocephin, Flagyl, and doxy for TOA.  Repeat lab work performed at our facility revealed the patient to have potassium level of 3.4, CBG of 137 mg/dL, lactic acid 1.2, and WBC of 26.22.  Patient also noted to have  elevated blood pressure reading of 162/94.  Patient reports she had a remote history of hypertension, but has not been on medication in years.  Hospital Medicine was consulted for medical management of hypokalemia, hypertension, and DVT.    PCP: Primary Doctor No          Past Medical History:   Diagnosis Date    Hypertension        Past Surgical History:   Procedure Laterality Date    AUGMENTATION OF BREAST Bilateral     LIPOSUCTION         Review of patient's allergies indicates:  No Known Allergies    Current Facility-Administered Medications on File Prior to Encounter   Medication    [COMPLETED] HYDROmorphone injection 1 mg    [COMPLETED] HYDROmorphone injection 1 mg    [COMPLETED] ondansetron injection 4 mg    [COMPLETED] ondansetron injection 4 mg    [DISCONTINUED] cefTRIAXone (ROCEPHIN) 1 g/50 mL D5W IVPB    [DISCONTINUED] doxycycline (VIBRAMYCIN) 100 mg in dextrose 5 % 250 mL IVPB    [DISCONTINUED] DULoxetine DR capsule 20 mg    [DISCONTINUED] enoxaparin injection 60 mg    [DISCONTINUED] iohexoL (OMNIPAQUE 350) injection 75 mL    [DISCONTINUED] metronidazole IVPB 500 mg     Current Outpatient Medications on File Prior to Encounter   Medication Sig    duloxetine HCl (CYMBALTA ORAL) Take by mouth.    semaglutide (OZEMPIC SUBQ) Inject into the skin.     Family History    None       Tobacco Use    Smoking status: Never    Smokeless tobacco: Never   Substance and Sexual Activity    Alcohol use: Not Currently    Drug use: Never    Sexual activity: Not Currently     Partners: Male     Birth control/protection: I.U.D.     Review of Systems   Constitutional:  Negative for chills, fatigue and fever.   Gastrointestinal:  Positive for abdominal pain. Negative for constipation, diarrhea, nausea and vomiting.   Genitourinary:  Positive for pelvic pain. Negative for difficulty urinating, flank pain, frequency, hematuria, urgency, vaginal bleeding, vaginal discharge and vaginal pain.   All other  systems reviewed and are negative.  Objective:     Vital Signs (Most Recent):  Temp: 98.5 °F (36.9 °C) (12/02/22 0452)  Pulse: 105 (12/02/22 0452)  Resp: 16 (12/02/22 0524)  BP: 132/76 (12/02/22 0452)  SpO2: 95 % (12/02/22 0452) Vital Signs (24h Range):  Temp:  [97.9 °F (36.6 °C)-98.5 °F (36.9 °C)] 98.5 °F (36.9 °C)  Pulse:  [105-122] 105  Resp:  [16-36] 16  SpO2:  [94 %-98 %] 95 %  BP: (132-162)/(67-98) 132/76     Weight: 69.2 kg (152 lb 8.9 oz)  Body mass index is 30.81 kg/m².    Physical Exam  Vitals and nursing note reviewed.   Constitutional:       General: She is awake. She is not in acute distress.     Appearance: Normal appearance. She is well-developed and well-groomed. She is not ill-appearing, toxic-appearing or diaphoretic.   HENT:      Head: Normocephalic and atraumatic.      Mouth/Throat:      Lips: Pink.      Mouth: Mucous membranes are moist.      Pharynx: Oropharynx is clear. Uvula midline.   Eyes:      Extraocular Movements: Extraocular movements intact.      Conjunctiva/sclera: Conjunctivae normal.      Pupils: Pupils are equal, round, and reactive to light.   Cardiovascular:      Rate and Rhythm: Normal rate and regular rhythm.      Heart sounds: Normal heart sounds. No murmur heard.  Pulmonary:      Effort: Pulmonary effort is normal.      Breath sounds: Normal breath sounds.   Abdominal:      General: Bowel sounds are normal.      Palpations: Abdomen is soft.      Tenderness: There is abdominal tenderness in the right lower quadrant, periumbilical area, suprapubic area and left lower quadrant.   Musculoskeletal:      Cervical back: Normal range of motion and neck supple.      Comments: 5/5 strength throughout   Skin:     General: Skin is warm and dry.      Capillary Refill: Capillary refill takes less than 2 seconds.   Neurological:      Mental Status: She is alert and oriented to person, place, and time. Mental status is at baseline.      GCS: GCS eye subscore is 4. GCS verbal subscore is 5.  GCS motor subscore is 6.      Cranial Nerves: Cranial nerves 2-12 are intact.      Sensory: Sensation is intact.      Motor: Motor function is intact.      Deep Tendon Reflexes: Reflexes are normal and symmetric.   Psychiatric:         Mood and Affect: Mood normal.         Speech: Speech normal.         Behavior: Behavior normal. Behavior is cooperative.     LABS:  Recent Results (from the past 24 hour(s))   CBC auto differential    Collection Time: 12/01/22  7:29 PM   Result Value Ref Range    WBC 26.04 (H) 3.90 - 12.70 K/uL    RBC 5.02 4.00 - 5.40 M/uL    Hemoglobin 12.3 12.0 - 16.0 g/dL    Hematocrit 39.1 37.0 - 48.5 %    MCV 78 (L) 82 - 98 fL    MCH 24.5 (L) 27.0 - 31.0 pg    MCHC 31.5 (L) 32.0 - 36.0 g/dL    RDW 15.9 (H) 11.5 - 14.5 %    Platelets 256 150 - 450 K/uL    MPV 11.4 9.2 - 12.9 fL    Immature Granulocytes CANCELED 0.0 - 0.5 %    Immature Grans (Abs) CANCELED 0.00 - 0.04 K/uL    nRBC 0 0 /100 WBC    Gran % 91.0 (H) 38.0 - 73.0 %    Lymph % 3.0 (L) 18.0 - 48.0 %    Mono % 3.0 (L) 4.0 - 15.0 %    Eosinophil % 0.0 0.0 - 8.0 %    Basophil % 0.0 0.0 - 1.9 %    Bands 3.0 %    Platelet Estimate Appears normal     Aniso Slight     Poik Slight     Differential Method Automated    Comprehensive metabolic panel    Collection Time: 12/01/22  7:29 PM   Result Value Ref Range    Sodium 134 (L) 136 - 145 mmol/L    Potassium 3.4 (L) 3.5 - 5.1 mmol/L    Chloride 98 95 - 110 mmol/L    CO2 24 23 - 29 mmol/L    Glucose 131 (H) 70 - 110 mg/dL    BUN 13 6 - 20 mg/dL    Creatinine 0.7 0.5 - 1.4 mg/dL    Calcium 8.2 (L) 8.7 - 10.5 mg/dL    Total Protein 6.1 6.0 - 8.4 g/dL    Albumin 1.9 (L) 3.5 - 5.2 g/dL    Total Bilirubin 0.4 0.1 - 1.0 mg/dL    Alkaline Phosphatase 94 55 - 135 U/L    AST 12 10 - 40 U/L    ALT 16 10 - 44 U/L    Anion Gap 12 8 - 16 mmol/L    eGFR >60.0 >60 mL/min/1.73 m^2   Lactic acid, plasma    Collection Time: 12/01/22  7:29 PM   Result Value Ref Range    Lactate (Lactic Acid) 1.6 0.5 - 2.2 mmol/L   CBC  auto differential    Collection Time: 12/02/22  4:44 AM   Result Value Ref Range    WBC 26.50 (H) 3.90 - 12.70 K/uL    RBC 4.36 4.00 - 5.40 M/uL    Hemoglobin 10.5 (L) 12.0 - 16.0 g/dL    Hematocrit 32.7 (L) 37.0 - 48.5 %    MCV 75 (L) 82 - 98 fL    MCH 24.1 (L) 27.0 - 31.0 pg    MCHC 32.1 32.0 - 36.0 g/dL    RDW 15.5 (H) 11.5 - 14.5 %    Platelets 286 150 - 450 K/uL    MPV 11.5 9.2 - 12.9 fL   Comprehensive metabolic panel    Collection Time: 12/02/22  4:44 AM   Result Value Ref Range    Sodium 134 (L) 136 - 145 mmol/L    Potassium 3.3 (L) 3.5 - 5.1 mmol/L    Chloride 98 95 - 110 mmol/L    CO2 24 23 - 29 mmol/L    Glucose 146 (H) 70 - 110 mg/dL    BUN 13 6 - 20 mg/dL    Creatinine 0.6 0.5 - 1.4 mg/dL    Calcium 8.4 (L) 8.7 - 10.5 mg/dL    Total Protein 5.5 (L) 6.0 - 8.4 g/dL    Albumin 1.7 (L) 3.5 - 5.2 g/dL    Total Bilirubin 0.4 0.1 - 1.0 mg/dL    Alkaline Phosphatase 122 55 - 135 U/L    AST 11 10 - 40 U/L    ALT 11 10 - 44 U/L    Anion Gap 12 8 - 16 mmol/L    eGFR >60 >60 mL/min/1.73 m^2   Hepatitis C antibody    Collection Time: 12/02/22  4:44 AM   Result Value Ref Range    Hepatitis C Ab Negative Negative   HIV 1/2 Ag/Ab (4th Gen)    Collection Time: 12/02/22  4:44 AM   Result Value Ref Range    HIV 1/2 Ag/Ab Negative Negative   Magnesium    Collection Time: 12/02/22  4:44 AM   Result Value Ref Range    Magnesium 1.9 1.6 - 2.6 mg/dL       RADIOLOGY  CT Abdomen Pelvis W WO Contrast    Result Date: 11/30/2022  EXAMINATION: CT ABDOMEN PELVIS W WO CONTRAST CLINICAL HISTORY: Flank pain, kidney stone suspected;LLQ abdominal pain; TECHNIQUE: Low dose axial images, sagittal and coronal reformations were obtained from the lung bases to the pubic symphysis before and following the IV administration of 75 mL of Omnipaque 350.  No oral contrast was given. COMPARISON: None. FINDINGS: Imaged lung bases show minimal dependent atelectasis and minimal scattered platelike scarring versus atelectasis.  No consolidation or  pleural effusion. Partially imaged bilateral breast implants. Base of the heart is within normal limits. Liver is upper limits of normal in size without focal process seen.  Gallbladder is normally distended with layering hyperattenuation suggesting sludge without acute cholecystitis.  Spleen is normal in size without focal process seen.  There are 2 suspected accessory splenules adjacent to the lower aspect of the spleen at the left mid to upper abdomen.  Pancreas is within normal limits.  There is non masslike thickening of the bilateral adrenal glands. Small hiatal hernia.  Stomach and duodenum are within normal limits. There is short-segment (approximate 5 cm length) small bowel into small bowel intussusception noted in the left upper quadrant which slightly changes in configuration from the pre to postcontrast images suggesting that it may be transient, although did not completely resolved on the slightly delayed postcontrast images.  The small bowel loops proximal to the intussusception are not significantly distended.  There is a moderate segmental length small-bowel just distal to the intussusception within the midline and right mid to upper abdomen (best seen on axial image 53 of series 6), which is distended with air-fluid level up to 2.7 cm, noting a transition point in the medial right mid abdomen best seen on axial image 72 of series 6.  This could reflect peristalsis; however, developing ileus versus small-bowel obstruction not excluded.  Within the mid to lower abdomen and upper pelvis, there are additional multiple prominent but not pathologically dilated, mostly fluid-filled small bowel loops with suspected transition point seen in the right mid abdomen anteriorly best seen on axial image 79 of series 6 and coronal image 31 of series 7.  Small bowel distal to this including the distal ileum and terminal ileum are relatively decompressed.  No significant wall thickening of the terminal ileum.  Appendix is within normal limits.  No significant colonic distension.  There is however abnormal circumferential wall thickening involving the mid to distal descending colon and proximal sigmoid colon within the left lower quadrant and left pelvis.  Rectum is normal in caliber.  No discrete enhancing mass within the small bowel or large bowel at this time.  No pneumatosis or portal venous gas. There is small volume likely reactive free fluid tracking along the left pericolic gutter to the mesenteric root and also pelvis, left more so than right.  There is stranding throughout the pelvis including surrounding majority of the uterus and bilateral adnexa, left more so than right.  There is also asymmetric left more than right perirectal stranding without definite significant rectal wall thickening, likely reactive.  There is small volume presacral edema as well. Numerous scattered subcentimeter mesenteric and retroperitoneal lymph nodes.  There are several prominent and also mildly enlarged lymph nodes along the left external iliac chain at the level of the acetabulum measuring up to 10 mm in maximum short axis.  There several subcentimeter bilateral inguinal lymph nodes, slightly more in number and larger in size on the left. Bilateral kidneys are normal in overall size, shape and location.  There is delayed nephrogram on the left.  No right hydronephrosis or significant perinephric stranding.  There is subtle stranding noted about the mid to lower left renal pole.  There is mild to moderate left-sided hydronephrosis as well as dilatation of the left ureter to the level of the upper pelvis likely secondary to mass effect from left adnexal mass described below.  Right ureter is normal in caliber but mildly displaced laterally and posteriorly secondary to right adnexal mass and rightward shift of the uterus. Urinary bladder is well distended noting wall thickening primarily along the left aspect.  There is also  stranding about the urinary bladder particularly along the left side.  There is mass effect upon the left and posterior aspect of the urinary bladder secondary to the uterus and left adnexal mass. Uterus is anteflexed and tilted towards the right.  There is a T-shaped metallic IUD which appears somewhat low in position at the mid to lower uterine segment and somewhat tilted within the uterine cavity suggesting abnormal positioning. There are heterogeneous but mostly low-density multiloculated appearing masses at the bilateral adnexa, left more so than right, with portions appearing tubular on the left and also potentially on the right although to a lesser degree.  The mass on the right measures 3.6 x 7.2 cm, with its largest cystic component measuring 4.1 cm.  The mass on the left measures up to 6.6 x 7.5 cm with largest cystic component measuring 6.5 cm in maximum transaxial dimension.  There is surrounding induration/inflammatory change, left more so than right, with mass effect upon the majority of the uterus particularly along the posterior aspect.  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  Less likely consideration given the degree of inflammation would be endometriomas and/or complex/hemorrhagic cysts.  Ovarian cystic neoplastic process could present similarly but considered less likely given the overall findings. There is heterogeneous and slight decreased attenuation involving the majority of the left common and external iliac veins, left common femoral vein and proximal portion of the left femoral vein with surrounding fat stranding, noting timing of contrast bolus is not tailored for optimal evaluation of the venous system as the initial images were pre contrast and the 2nd set of images were post contrast within the arterial system. No upper abdominal ascites.  No free air. No significant atherosclerosis.  No  aortic aneurysm or dissection. Osseous structures show chronic appearing minimal anterior wedge deformity of several mid to lower thoracic vertebral bodies and minimal degenerative change without acute or destructive process seen.     Multi location and multi organ acute pathologic processes: : 1. Left greater than right diffuse pelvic inflammatory process concerning for sequela of PID, complicated by bilateral adnexal complex cystic masses concerning for pyosalpinx/complex hydrosalpinx versus tubo-ovarian abscesses, with associated regional mass effect and induration/inflammatory change, as further outlined above.  Less likely considerations could include endometriomas versus complex/hemorrhagic cyst with partial rupture and associated inflammatory change, versus cystic ovarian neoplasms.  Clinical correlation and with pelvic exam advised.  Gyn consult may be warranted.  Further with pelvic ultrasound is advised. 2. IUD appears somewhat low in position and somewhat tilted within the mid to lower uterine cavity, some of which may be related to significant mass effect upon the uterus from left larger than right adnexal masses described above. 3. Asymmetric mild to moderate left-sided hydroureteronephrosis to the level of the pelvis with decreased left renal function presumably related to mass effect upon the distal left ureter.  There is lesser degree mild mass effect upon the distal right ureter without associated obstructive uropathy at this time.  Clinical correlation and with urinalysis advised to exclude underlying pyeloureteronephritis. 4. Left more than right urinary bladder wall thickening with adjacent inflammatory change presumably reactive/related to aforementioned pelvic and bilateral adnexal inflammatory process, with separate/non related other infectious/inflammatory cystitis in the differential.  Clinical correlation and with urinalysis advised. GI: 1. Findings concerning for developing small bowel  obstruction versus reactive regional ileus, presumably related to aforementioned /pelvic inflammatory process.  There are 2 separate transition points identified.  The more proximal transition point is seen within the medial right mid abdomen (best seen on axial image 72 of series 6), beginning just distal to a short-segment small bowel into small bowel intussusception which is located more cephalad in the midline and left mid to upper abdomen (best seen on axial image 53 of series 6).  The small bowel intussusception may be transient as this appears to slightly changed in configuration on the delayed postcontrast images.  The more distal 2nd transition point is seen within the anterior right mid to lower abdomen (best seen on axial image 79 of series 6). 2. Mid to distal descending and proximal sigmoid colonic abnormal circumferential wall thickening with adjacent inflammatory change, presumably reactive from adjacent left greater than right pelvic inflammatory process.  A separate unrelated colitis from other infectious or inflammatory process could present similarly but is considered less likely.  No significant colonic distension at this time. Vascular: 1. Findings concerning for potential thrombus/thrombophlebitis within the left common and external iliac veins, left common femoral vein and partially imaged proximal left femoral vein.  Clinical correlation advised.  Further evaluation with left lower extremity venous upper ultrasound to include the iliac vessels is recommended. Miscellaneous: 1. Left pelvic mild lymphadenopathy, likely reactive. 2. Borderline hepatomegaly. 3. Suspected gallbladder sludge. 4. Small hiatal hernia. 5. Few additional incidental findings as above. This report was flagged in Epic as abnormal.  This report was flagged in Epic as containing an incidental finding. Electronically signed by: Vic Cee MD Date:    11/30/2022 Time:    14:11    US Lower Extremity Veins Left    Result  Date: 11/30/2022  EXAMINATION: US LOWER EXTREMITY VEINS LEFT CLINICAL HISTORY: Left lower quadrant pain TECHNIQUE: Duplex and color flow Doppler evaluation and graded compression of the left lower extremity veins was performed. COMPARISON: CT abdomen and pelvis earlier same day FINDINGS: Partially imaged left external iliac vein: No flow seen. Left thigh veins: The common femoral, femoral, popliteal, upper greater saphenous, and deep femoral veins are patent and free of thrombus. The veins are normally compressible and have normal phasic flow and augmentation response. Left calf veins: The visualized calf veins are patent. Contralateral CFV: The contralateral (right) common femoral vein is patent and free of thrombus. Miscellaneous: None     Findings concerning for occlusive DVT within the partially imaged left external iliac vein, correlating with findings on recent cross-sectional imaging earlier same day. No evidence of associated DVT within the left lower extremity at this time. This report was flagged in Epic as abnormal. Electronically signed by: Vic Cee MD Date:    11/30/2022 Time:    16:35    US Pelvis Comp with Transvag NON-OB (xpd    Result Date: 11/30/2022  EXAMINATION: US PELVIS COMP WITH TRANSVAG NON-OB (XPD) CLINICAL HISTORY: possible tubo-ovarian abscess; TECHNIQUE: Transabdominal sonography of the pelvis was performed, followed by transvaginal sonography to better evaluate the uterus and ovaries. COMPARISON: CT of the abdomen and pelvis from 11/30/2022. FINDINGS: Uterus: Size: 10.5 x 5.1 x 6.9 cm. The uterus is anteverted and normal in appearance. Masses: None Endometrium: Normal in this pre menopausal patient, measuring 4 mm. There is a low lying IUD which extends through the cervical os. Right ovary: Size: 6.0 x 4.3 x 5.4 cm Appearance: There is a complex cystic lesion compatible with a tubo-ovarian abscess measuring 4.4 x 3.6 x 3.8 cm.  There is surrounding hypervascularity. Vascular flow:  Normal. Left ovary: There is a complex cystic lesion compatible with a tubo-ovarian abscess other 9.0 x 6.2 x 8.1 cm.  Difficult to distinguish the left ovarian tissue from the abscess.  There is surrounding hypervascularity. Vascular Flow: Normal. Free Fluid: Heterogeneous moderate amount of free fluid in the pelvis.     1. Bilateral tubo-ovarian abscesses, left greater than right. 2. Abnormal positioning of the IUD which is low lying and extends through the cervical os. Electronically signed by: Husam Che Date:    11/30/2022 Time:    17:25      EKG    MICROBIOLOGY    MDM     Amount and/or Complexity of Data Reviewed  Clinical lab tests: reviewed  Tests in the radiology section of CPT®: reviewed  Tests in the medicine section of CPT®: reviewed  Discussion of test results with the performing providers: yes  Decide to obtain previous medical records or to obtain history from someone other than the patient: yes  Review and summarize past medical records: yes  Discuss the patient with other providers: yes  Independent visualization of images, tracings, or specimens: yes          Assessment/Plan:     * TOA (tubo-ovarian abscess)  Currently being treated with Rocephin, Flagyl, and doxycycline.  Followed by gyn services as primary.  Plan:  -continue antibiotics  -analgesics as needed  -repeat labs per gyn recommendations      Acute deep vein thrombosis (DVT) of iliac vein of left lower extremity  Currently receiving Lovenox 60 mg b.i.d. subQ.  Last dose administered at 8:50 p.m. at outside facility on 12/01/2022.  Plan:  -continue Lovenox b.i.d.      Sepsis without acute organ dysfunction  Related to TOA.  -Continue abx treatment      Ileus, unspecified  Questionable intussusseption vs SBO, possible early ileus.  I discussed pt's case with Dr. Rick with surgery prior to pt's arrival.  At this point I don't think pt needs urgent surgery, but will plan to reimage with contrast  -keep npo for now except for ice  chips         Hypokalemia  Potassium level noted to be at 3.4.  Current receiving D5 normal saline with 20 mEq of potassium.  -Continue IVF  -repeat chemistry in am        Elevated blood pressure reading  Noted to have elevated blood pressure reading upon arrival 162/94.  Remote history of hypertension.  No longer takes medication for.  BP control through diet and exercise.  Elevation likely secondary to acute infection and pain.  Plan:  -monitor BP readings  -consider IV hydralazine p.r.n.      IUD migration  IUD was removed by gyn provider at bedside without difficulty.        VTE Risk Mitigation (From admission, onward)         Ordered     enoxaparin injection 70 mg  Every 12 hours (non-standard times)         12/02/22 0651     IP VTE HIGH RISK PATIENT  Once         12/02/22 0124     Place sequential compression device  Until discontinued         12/02/22 0124                Thank you for your consult. I reviewed case, workup findings, and  Treatment plan with attending physician (Dr. Barakat).  He is in agreement with treatment plan. Hospital medicine team will continue to follow-up with patient. Please contact us if you have any additional questions.    Jarod Barakat NP  Department of Hospital Medicine   [facility]

## 2022-12-02 NOTE — ASSESSMENT & PLAN NOTE
Noted to have elevated blood pressure reading upon arrival 162/94.  Remote history of hypertension.  No longer takes medication for.  BP control through diet and exercise.  Elevation likely secondary to acute infection and pain.  Plan:  -monitor BP readings  -consider IV hydralazine p.r.n.

## 2022-12-02 NOTE — PLAN OF CARE
Recommendations  1) Continue Regular diet   2) Weekly wts   3) RD to complete NFPE as needed at follow up    Goals: Pt to meet > or equal to 75% EEN by RD follow up  Nutrition Goal Status: new  Communication of RD Recs: other (comment) (POC)    Assessment and Plan      Nutrition Problem  Inadequate oral intake    Related to (etiology):   N/V/D    Signs and Symptoms (as evidenced by):   Pt reports decreased PO and appetite   N/V/D since 11/26 PTA    Interventions(treatment strategy):  Collaboration with other providers  General, Healthful Diet    Nutrition Diagnosis Status:   New

## 2022-12-02 NOTE — ASSESSMENT & PLAN NOTE
Currently receiving Lovenox 60 mg b.i.d. subQ.  Last dose administered at 8:50 p.m. at outside facility on 12/01/2022.  Plan:  -continue Lovenox b.i.d.

## 2022-12-02 NOTE — ASSESSMENT & PLAN NOTE
Questionable intussusseption vs SBO, possible early ileus.  I discussed pt's case with Dr. Rick with surgery prior to pt's arrival.  At this point I don't think pt needs urgent surgery, but will plan to reimage with contrast  -keep npo for now except for ice chips

## 2022-12-02 NOTE — PLAN OF CARE
[facility]  Initial Discharge Assessment       Primary Care Provider: Primary Doctor No    Admission Diagnosis: TOA (tubo-ovarian abscess) [N70.93]    Admission Date: 12/2/2022  Expected Discharge Date:     Discharge Barriers Identified: None    Payor:  / Plan:  [employer] / Product Type: Government /     Extended Emergency Contact Information  Primary Emergency Contact: Carrie Berumen  Mobile Phone: [phone]  Relation: Friend  Preferred language: English   needed? No    Discharge Plan A: Home  Discharge Plan B: Home      [facility] [place] - [address]  [address] [place] [address]  Phone: [phone] Fax: [phone]      Initial Assessment (most recent)       Adult Discharge Assessment - 12/02/22 1122          Discharge Assessment    Assessment Type Discharge Planning Assessment     Confirmed/corrected address, phone number and insurance Yes     Confirmed Demographics Correct on Facesheet     Source of Information patient     Communicated GRETCHEN with patient/caregiver Date not available/Unable to determine     Lives With friend(s)     Do you expect to return to your current living situation? Yes     Do you have help at home or someone to help you manage your care at home? Yes     Who are your caregiver(s) and their phone number(s)? Carrie Berumen (friend) [phone]     Prior to hospitilization cognitive status: Alert/Oriented     Current cognitive status: Alert/Oriented     Walking or Climbing Stairs Difficulty none     Dressing/Bathing Difficulty none     Home Layout Able to live on 1st floor     Equipment Currently Used at Home none     Readmission within 30 days? No     Patient currently being followed by outpatient case management? No     Do you currently have service(s) that help you manage your care at home? No     Do you take prescription medications? Yes     Do you have prescription coverage? Yes     Do you have  any problems affording any of your prescribed medications? No     Is the patient taking medications as prescribed? yes     Who is going to help you get home at discharge? Friend, family     How do you get to doctors appointments? car, drives self     Are you on dialysis? No     Do you take coumadin? No     Discharge Plan A Home     Discharge Plan B Home     DME Needed Upon Discharge  none     Discharge Plan discussed with: Patient     Discharge Barriers Identified None        Physical Activity    On average, how many days per week do you engage in moderate to strenuous exercise (like a brisk walk)? 5 days     On average, how many minutes do you engage in exercise at this level? 60 min        Financial Resource Strain    How hard is it for you to pay for the very basics like food, housing, medical care, and heating? Not very hard        Housing Stability    In the last 12 months, was there a time when you were not able to pay the mortgage or rent on time? No     In the last 12 months, how many places have you lived? 2     In the last 12 months, was there a time when you did not have a steady place to sleep or slept in a shelter (including now)? No        Transportation Needs    In the past 12 months, has lack of transportation kept you from medical appointments or from getting medications? No     In the past 12 months, has lack of transportation kept you from meetings, work, or from getting things needed for daily living? No        Food Insecurity    Within the past 12 months, you worried that your food would run out before you got the money to buy more. Never true     Within the past 12 months, the food you bought just didn't last and you didn't have money to get more. Never true        Stress    Do you feel stress - tense, restless, nervous, or anxious, or unable to sleep at night because your mind is troubled all the time - these days? Only a little        Social Connections    In a typical week, how many times do  you talk on the phone with family, friends, or neighbors? More than three times a week     How often do you get together with friends or relatives? More than three times a week     How often do you attend Latter day or Hoahaoism services? More than 4 times per year     Do you belong to any clubs or organizations such as Latter day groups, unions, fraternal or athletic groups, or school groups? No     How often do you attend meetings of the clubs or organizations you belong to? Never     Are you , , , , never , or living with a partner?         Alcohol Use    Q1: How often do you have a drink containing alcohol? Monthly or less     Q2: How many drinks containing alcohol do you have on a typical day when you are drinking? 1 or 2     Q3: How often do you have six or more drinks on one occasion? Never        Relationship/Environment    Name(s) of Who Lives With Patient Carrie Berumen (friend) [phone]                      Met with pt and her father at bedside for DC assessment. Pt lives at home with her friend, temporarily while her own new place is being renovated. Pt does not use any DME. No CM DC needs identified a this time. SWer provided a transitional care folder, information on advanced directives, information on pharmacy bedside delivery, and discharge planning begins on admission with contact information for any needs/questions.      Efra Pascual LMSW 12/2/2022 11:27 AM

## 2022-12-02 NOTE — ASSESSMENT & PLAN NOTE
Pt has already received lovenox 60mg bid, last dose was 8:50 pm.  -consult hospital medicine for assistance with management  -explained that if she needs surgery, anticoagulation will need to be held, and will need to consider IVC filter placement

## 2022-12-02 NOTE — ASSESSMENT & PLAN NOTE
Questionable intussusseption vs SBO, possible early ileus.  Dr. Ledesma discussed pt's case with Dr. Rick with surgery prior to pt's arrival. No surgical plan at this time, CT AP with contrast pending.   - Keep NPO for now except for ice chips

## 2022-12-02 NOTE — PROGRESS NOTES
[facility]  Obstetrics & Gynecology  Progress Note    Patient Name: Amy Womack  MRN: [id number]  Admission Date: 2022  Primary Care Provider: Primary Doctor No  Principal Problem: TOA (tubo-ovarian abscess)    Subjective:     HPI:  43 y/o  presents to our hospital as a transfer from [facility] ED.  Pt presented there on 22.  Symptoms started on  with nausea/vomiting and loose stools.  Was around some friends whose children had similar symptoms.  Over the next few days, her abdomen became progressively more distended, then started having pelvic pain (LLQ>RLQ) and fevers and chills.  Wednesday morning, she developed significant left groin and left upper thigh burning pain, so presented to the ED.  Work-up in the ED revealed a leukocytosis (WBC's 26.2), mild hypokalemia, and CT Abd/pelvis with multiple findings, but most significantly:  bilateral TOA's (3.6 x 7.2 cm on the right, and 6.6 x 7.5 cm on the left)  IUD low in the uterus  left hydrouretronephrosis likely due to mass effect from abscesses  Questionable LUQ intussusception with either SBO vs developing ileus.  Potential thrombus in left common and external iliac veins and left common femoral vein    Pelvic ultrasound: bilateral TOA's; IUD located within the cervix  LE Dopplers: DVT in left external iliac vein    Gyn hx significant for Mirena IUD in place for 6 years.  Achieved amenorrhea with it until 1.5 years ago, then has had regular, monthly periods since then.  Started having some light bleeding today.  Pt was sexually active with her boyfriend of 4 years, but they broke up a few months ago.  Not sexually active since then.  Does not suspect any infidelity prior to their break-up.  GC/CT from [facility] pending.        Interval History: Transferred from [facility] for TOA. On IV abx Ceftriaxone, Doxycycline, and Flagyl. Contrast CT AP results pending. Will consider advancing diet pending CT results.  Patient is well appearing on exam today and states that overall she is feeling better. She does report some consistent abdominal bloating, and states that she has been passing flatus frequently.     Scheduled Meds:   cefTRIAXone (ROCEPHIN) IVPB  1 g Intravenous Q24H    doxycycline (VIBRAMYCIN) IVPB  100 mg Intravenous Q12H    DULoxetine  20 mg Oral BID    enoxaparin  1 mg/kg Subcutaneous Q12H    metronidazole  500 mg Intravenous Q12H     Continuous Infusions:   dextrose 5 % and 0.9 % NaCl with KCl 20 mEq 125 mL/hr at 12/02/22 0309     PRN Meds:acetaminophen, HYDROmorphone, HYDROmorphone, influenza, ondansetron, prochlorperazine, sodium chloride 0.9%    Review of patient's allergies indicates:  No Known Allergies    Objective:     Vital Signs (Most Recent):  Temp: 97.7 °F (36.5 °C) (12/02/22 0809)  Pulse: 99 (12/02/22 0809)  Resp: 17 (12/02/22 0809)  BP: (!) 153/89 (12/02/22 0809)  SpO2: 96 % (12/02/22 0809)   Vital Signs (24h Range):  Temp:  [97.7 °F (36.5 °C)-98.5 °F (36.9 °C)] 97.7 °F (36.5 °C)  Pulse:  [] 99  Resp:  [16-36] 17  SpO2:  [94 %-98 %] 96 %  BP: (132-162)/(72-98) 153/89     Weight: 69.2 kg (152 lb 8.9 oz)  Body mass index is 30.81 kg/m².  No LMP recorded.    I&O (Last 24H):  No intake or output data in the 24 hours ending 12/02/22 1028      Laboratory:  Recent Lab Results         12/02/22  0444   12/01/22  1929        Albumin 1.7   1.9       Alkaline Phosphatase 122   94       ALT 11   16       Anion Gap 12   12       Aniso   Slight       AST 11   12       Bands 24.0   3.0       Basophil % 0.0   0.0       BILIRUBIN TOTAL 0.4  Comment: For infants and newborns, interpretation of results should be based  on gestational age, weight and in agreement with clinical  observations.    Premature Infant recommended reference ranges:  Up to 24 hours.............<8.0 mg/dL  Up to 48 hours............<12.0 mg/dL  3-5 days..................<15.0 mg/dL  6-29 days.................<15.0 mg/dL     0.4  Comment:  For infants and newborns, interpretation of results should be based  on gestational age, weight and in agreement with clinical  observations.    Premature Infant recommended reference ranges:  Up to 24 hours.............<8.0 mg/dL  Up to 48 hours............<12.0 mg/dL  3-5 days..................<15.0 mg/dL  6-29 days.................<15.0 mg/dL         BUN 13   13       Calcium 8.4   8.2       Chloride 98   98       CO2 24   24       Creatinine 0.6   0.7       Differential Method Manual  Comment: CORRECTED RESULT; previously reported as Automated on 12/02/2022 at   08:00.    [C]   Automated       eGFR >60   >60.0       Eosinophil % 0.0   0.0       Glucose 146   131       Gran % 67.0   91.0       Hematocrit 32.7   39.1       Hemoglobin 10.5   12.3       Hepatitis C Ab Negative         HIV 1/2 Ag/Ab Negative         Immature Grans (Abs) CANCELED  Comment: Mild elevation in immature granulocytes is non specific and   can be seen in a variety of conditions including stress response,   acute inflammation, trauma and pregnancy. Correlation with other   laboratory and clinical findings is essential.    Result canceled by the ancillary.     CANCELED  Comment: Mild elevation in immature granulocytes is non specific and   can be seen in a variety of conditions including stress response,   acute inflammation, trauma and pregnancy. Correlation with other   laboratory and clinical findings is essential.    Result canceled by the ancillary.         Immature Granulocytes CANCELED  Comment: Result canceled by the ancillary.   CANCELED  Comment: Result canceled by the ancillary.       Lactate, Kieran   1.6  Comment: Falsely low lactic acid results can be found in samples   containing >=13.0 mg/dL total bilirubin and/or >=3.5 mg/dL   direct bilirubin.         Lymph % 7.0   3.0       Magnesium 1.9         MCH 24.1   24.5       MCHC 32.1   31.5       MCV 75   78       Mono % 2.0   3.0       MPV 11.5   11.4       nRBC 0   0       Platelet  Estimate Appears normal   Appears normal       Platelets 286   256       Poikilocytosis   Slight       Potassium 3.3   3.4       PROTEIN TOTAL 5.5   6.1       RBC 4.36   5.02       RDW 15.5   15.9       RPR Non-reactive         Sodium 134   134       WBC 26.50   26.04                [C] - Corrected Result             I have personallly reviewed all pertinent lab results from the last 24 hours.      Physical Exam:   Constitutional: She is oriented to person, place, and time. She appears well-developed and well-nourished. No distress.    HENT:   Head: Normocephalic and atraumatic.    Eyes: Right eye exhibits no discharge. Left eye exhibits no discharge.     Cardiovascular:  Normal rate and regular rhythm.             Pulmonary/Chest: Effort normal and breath sounds normal. No respiratory distress.        Abdominal: Bowel sounds are normal. She exhibits distension. She exhibits no abdominal incision. There is abdominal tenderness (Lower abdomen).             Musculoskeletal: Normal range of motion.       Neurological: She is alert and oriented to person, place, and time.    Skin: Skin is warm and dry. She is not diaphoretic.    Psychiatric: She has a normal mood and affect. Her behavior is normal. Judgment and thought content normal.           Assessment/Plan:     * TOA (tubo-ovarian abscess)  -Suspected TOA based on exam findings, imaging, and labs with likely resulting ileus.  - Continue IV rocephin, flagyl, and doxycycline (these were started the evening of 11/30/22)  - HIV and syphillis and GC negative  - Hepatitis screening pending  - CT with contrast pending  - Dr. Ledesma previously explained that we typically treat for 48-72 hours with IV antibiotics first, and that surgery would be indicated if pt does not respond.  Discussed possible need for hysterectomy with removal of both tubes, and possibly both ovaries.  Pt states she has completed childbearing      Elevated blood pressure reading  - Hx of previous  HTN managed with diet and exercise  - Continue to monitor  - Consider PRN anti-hypertensive     Hypokalemia  - Likely due to GI losses; replace through IV fluids   - K+ 3.3 on AM labs    Sepsis without acute organ dysfunction  - Due to TOA's  - Continue broad spectrum of rocephin, doxy, flagyl    Acute deep vein thrombosis (DVT) of iliac vein of left lower extremity  - Lovenox 60mg BID  - Hospital medicine consulted for assistance with management, appreciate recs  - In the event of surgery anticoagulation will need to be held, and will need to consider IVC filter placement    IUD migration  - IUD removed early in the morning on 12/2    Ileus, unspecified  Questionable intussusseption vs SBO, possible early ileus.  Dr. Ledesma discussed pt's case with Dr. Rick with surgery prior to pt's arrival. No surgical plan at this time, CT AP with contrast pending.   - Keep NPO for now except for ice chips          Kayli Rosado PA-C  Obstetrics & Gynecology  [facility]

## 2022-12-02 NOTE — PLAN OF CARE
Problem: Adult Inpatient Plan of Care  Goal: Plan of Care Review  12/2/2022 1614 by Rosemary Rosenberg RN  Outcome: Ongoing, Progressing  12/2/2022 1553 by Rosemary Rosenberg RN  Outcome: Ongoing, Progressing  Goal: Patient-Specific Goal (Individualized)  12/2/2022 1614 by Rosemary Rosenberg RN  Outcome: Ongoing, Progressing  12/2/2022 1553 by Rosemary Rosenberg RN  Outcome: Ongoing, Progressing  Goal: Absence of Hospital-Acquired Illness or Injury  12/2/2022 1614 by Rosemary Rosenberg RN  Outcome: Ongoing, Progressing  12/2/2022 1553 by Rosemary Rosenberg RN  Outcome: Ongoing, Progressing  Goal: Optimal Comfort and Wellbeing  12/2/2022 1614 by Rosemary Rosenberg RN  Outcome: Ongoing, Progressing  12/2/2022 1553 by Rosemary Rosenberg RN  Outcome: Ongoing, Progressing  Goal: Readiness for Transition of Care  12/2/2022 1614 by Rosemary Rosenberg RN  Outcome: Ongoing, Progressing  12/2/2022 1553 by Rosemary Rosenberg RN  Outcome: Ongoing, Progressing     Problem: Adjustment to Illness (Sepsis/Septic Shock)  Goal: Optimal Coping  12/2/2022 1614 by Rosemary Rosenberg RN  Outcome: Ongoing, Progressing  12/2/2022 1553 by Rosemary Rosenberg RN  Outcome: Ongoing, Progressing     Problem: Bleeding (Sepsis/Septic Shock)  Goal: Absence of Bleeding  12/2/2022 1614 by Rosemary Rosenberg RN  Outcome: Ongoing, Progressing  12/2/2022 1553 by Rosemary Rosenberg RN  Outcome: Ongoing, Progressing

## 2022-12-02 NOTE — HOSPITAL COURSE
12/2/22: Transferred overnight from [place] for TOA. Continue IV abx. CT AP with contrast results pending. WBC 26.5. Patient states that she feels better than she did earlier this week. IUD removed overnight by Dr. Ledesma.   12/2/22--bilateral toa persist on ct scan, with Interval slight reduction in size of bilateral complex multiloculated tubo-ovarian abscesses, on the right measuring 6.8 x 3.9 cm previously 7.5 x 3.9 cm, and on the left today 5.5 x 4.5 cm, previously 7 x 6 cm     12/5/22- patient reports her pain has significantly improved. Tolerating oral intake. Meeting discharge criteria

## 2022-12-02 NOTE — ASSESSMENT & PLAN NOTE
Potassium level noted to be at 3.4.  Current receiving D5 normal saline with 20 mEq of potassium.  -Continue IVF  -repeat chemistry in am

## 2022-12-02 NOTE — PROGRESS NOTES
"[facility] - [facility]  Adult Nutrition  Progress Note    SUMMARY       Recommendations  1) Continue Regular diet   2) Weekly wts   3) RD to complete NFPE as needed at follow up    Goals: Pt to meet > or equal to 75% EEN by RD follow up  Nutrition Goal Status: new  Communication of RD Recs: other (comment) (POC)    Assessment and Plan      Nutrition Problem  Inadequate oral intake    Related to (etiology):   N/V/D    Signs and Symptoms (as evidenced by):   Pt reports decreased PO and appetite   N/V/D since 11/26 PTA    Interventions(treatment strategy):  Collaboration with other providers  General, Healthful Diet    Nutrition Diagnosis Status:   New      Malnutrition Assessment   JESUS NFPE due to remote coverage    Reason for Assessment  Reason For Assessment: identified at risk by screening criteria (MST 3)  Diagnosis: other (see comments) (TOA)  Relevant Medical History: HTN  Interdisciplinary Rounds: did not attend  General Information Comments: RD remote for coverage, called room phone - no answer. Pt admit for TOA. With N/V/D since 11/26. Endorses decreased appetite/intake on MST screen. No wt history on file. RD to follow up  Nutrition Discharge Planning: Discharge on cardiac diet    Nutrition Risk Screen  Nutrition Risk Screen: no indicators present    Nutrition/Diet History  Factors Affecting Nutritional Intake: decreased appetite, nausea/vomiting, diarrhea    Anthropometrics  Temp: 97.8 °F (36.6 °C)  Height: 4' 11" (149.9 cm)  Height (inches): 59 in  Weight Method: Bed Scale  Weight: 69.2 kg (152 lb 8.9 oz)  Weight (lb): 152.56 lb  Ideal Body Weight (IBW), Female: 95 lb  % Ideal Body Weight, Female (lb): 160.59 %  BMI (Calculated): 30.8       Lab/Procedures/Meds  Pertinent Labs Reviewed: reviewed  Pertinent Labs Comments: H/H 10.5/32.7, MCV 75, Na 134, K 3.3, glu 146, Ca 8.4, Alb 1.7  Pertinent Medications Reviewed: reviewed  Pertinent Medications Comments: reviewed    Estimated/Assessed Needs  Weight Used For " Calorie Calculations: 68.9 kg (152 lb)  Energy Calorie Requirements (kcal): 1260 kcal/day based on MSJ x no AF for BMI > 30  Energy Need Method: [place]  Protein Requirements: 55-69 g/day based on 0.8-1 g/kg  Weight Used For Protein Calculations: 68.9 kg (152 lb)  Fluid Requirements (mL): 1 mL/kcal or per MD  Estimated Fluid Requirement Method: RDA Method  RDA Method (mL): 1260       Nutrition Prescription Ordered  Current Diet Order: Regular    Evaluation of Received Nutrient/Fluid Intake  No intake or output data in the 24 hours ending 12/02/22 1606    Energy Calories Required: not meeting needs  Protein Required: not meeting needs  Fluid Required: not meeting needs  Comments: LBM 11/29  Tolerance: tolerating  % Intake of Estimated Energy Needs: JESUS  % Meal Intake: JESUS no intake recorded on file    Nutrition Risk  Level of Risk/Frequency of Follow-up:  (1-2x weekly)     Monitor and Evaluation  Food and Nutrient Intake: energy intake, food and beverage intake  Food and Nutrient Adminstration: diet order  Knowledge/Beliefs/Attitudes: beliefs and attitudes  Physical Activity and Function: nutrition-related ADLs and IADLs  Anthropometric Measurements: weight change, body mass index, weight  Biochemical Data, Medical Tests and Procedures: lipid profile, electrolyte and renal panel, gastrointestinal profile, glucose/endocrine profile, inflammatory profile  Nutrition-Focused Physical Findings: overall appearance, extremities, muscles and bones, skin     Nutrition Follow-Up  RD Follow-up?: Yes

## 2022-12-02 NOTE — SUBJECTIVE & OBJECTIVE
Interval History: Transferred from [employer] for [facility]. On IV abx Ceftriaxone, Doxycycline, and Flagyl. Contrast CT AP results pending. Will consider advancing diet pending CT results. Patient is well appearing on exam today and states that overall she is feeling better. She does report some consistent abdominal bloating, and states that she has been passing flatus frequently.     Scheduled Meds:   cefTRIAXone (ROCEPHIN) IVPB  1 g Intravenous Q24H    doxycycline (VIBRAMYCIN) IVPB  100 mg Intravenous Q12H    DULoxetine  20 mg Oral BID    enoxaparin  1 mg/kg Subcutaneous Q12H    metronidazole  500 mg Intravenous Q12H     Continuous Infusions:   dextrose 5 % and 0.9 % NaCl with KCl 20 mEq 125 mL/hr at 12/02/22 0309     PRN Meds:acetaminophen, HYDROmorphone, HYDROmorphone, influenza, ondansetron, prochlorperazine, sodium chloride 0.9%    Review of patient's allergies indicates:  No Known Allergies    Objective:     Vital Signs (Most Recent):  Temp: 97.7 °F (36.5 °C) (12/02/22 0809)  Pulse: 99 (12/02/22 0809)  Resp: 17 (12/02/22 0809)  BP: (!) 153/89 (12/02/22 0809)  SpO2: 96 % (12/02/22 0809)   Vital Signs (24h Range):  Temp:  [97.7 °F (36.5 °C)-98.5 °F (36.9 °C)] 97.7 °F (36.5 °C)  Pulse:  [] 99  Resp:  [16-36] 17  SpO2:  [94 %-98 %] 96 %  BP: (132-162)/(72-98) 153/89     Weight: 69.2 kg (152 lb 8.9 oz)  Body mass index is 30.81 kg/m².  No LMP recorded.    I&O (Last 24H):  No intake or output data in the 24 hours ending 12/02/22 1028      Laboratory:  Recent Lab Results         12/02/22  0444   12/01/22  1929        Albumin 1.7   1.9       Alkaline Phosphatase 122   94       ALT 11   16       Anion Gap 12   12       Aniso   Slight       AST 11   12       Bands 24.0   3.0       Basophil % 0.0   0.0       BILIRUBIN TOTAL 0.4  Comment: For infants and newborns, interpretation of results should be based  on gestational age, weight and in agreement with clinical  observations.    Premature Infant recommended reference  ranges:  Up to 24 hours.............<8.0 mg/dL  Up to 48 hours............<12.0 mg/dL  3-5 days..................<15.0 mg/dL  6-29 days.................<15.0 mg/dL     0.4  Comment: For infants and newborns, interpretation of results should be based  on gestational age, weight and in agreement with clinical  observations.    Premature Infant recommended reference ranges:  Up to 24 hours.............<8.0 mg/dL  Up to 48 hours............<12.0 mg/dL  3-5 days..................<15.0 mg/dL  6-29 days.................<15.0 mg/dL         BUN 13   13       Calcium 8.4   8.2       Chloride 98   98       CO2 24   24       Creatinine 0.6   0.7       Differential Method Manual  Comment: CORRECTED RESULT; previously reported as Automated on 12/02/2022 at   08:00.    [C]   Automated       eGFR >60   >60.0       Eosinophil % 0.0   0.0       Glucose 146   131       Gran % 67.0   91.0       Hematocrit 32.7   39.1       Hemoglobin 10.5   12.3       Hepatitis C Ab Negative         HIV 1/2 Ag/Ab Negative         Immature Grans (Abs) CANCELED  Comment: Mild elevation in immature granulocytes is non specific and   can be seen in a variety of conditions including stress response,   acute inflammation, trauma and pregnancy. Correlation with other   laboratory and clinical findings is essential.    Result canceled by the ancillary.     CANCELED  Comment: Mild elevation in immature granulocytes is non specific and   can be seen in a variety of conditions including stress response,   acute inflammation, trauma and pregnancy. Correlation with other   laboratory and clinical findings is essential.    Result canceled by the ancillary.         Immature Granulocytes CANCELED  Comment: Result canceled by the ancillary.   CANCELED  Comment: Result canceled by the ancillary.       Lactate, Kieran   1.6  Comment: Falsely low lactic acid results can be found in samples   containing >=13.0 mg/dL total bilirubin and/or >=3.5 mg/dL   direct bilirubin.          Lymph % 7.0   3.0       Magnesium 1.9         MCH 24.1   24.5       MCHC 32.1   31.5       MCV 75   78       Mono % 2.0   3.0       MPV 11.5   11.4       nRBC 0   0       Platelet Estimate Appears normal   Appears normal       Platelets 286   256       Poikilocytosis   Slight       Potassium 3.3   3.4       PROTEIN TOTAL 5.5   6.1       RBC 4.36   5.02       RDW 15.5   15.9       RPR Non-reactive         Sodium 134   134       WBC 26.50   26.04                [C] - Corrected Result             I have personallly reviewed all pertinent lab results from the last 24 hours.      Physical Exam:   Constitutional: She is oriented to person, place, and time. She appears well-developed and well-nourished. No distress.    HENT:   Head: Normocephalic and atraumatic.    Eyes: Right eye exhibits no discharge. Left eye exhibits no discharge.     Cardiovascular:  Normal rate and regular rhythm.             Pulmonary/Chest: Effort normal and breath sounds normal. No respiratory distress.        Abdominal: Bowel sounds are normal. She exhibits distension. She exhibits no abdominal incision. There is abdominal tenderness (Lower abdomen).             Musculoskeletal: Normal range of motion.       Neurological: She is alert and oriented to person, place, and time.    Skin: Skin is warm and dry. She is not diaphoretic.    Psychiatric: She has a normal mood and affect. Her behavior is normal. Judgment and thought content normal.

## 2022-12-02 NOTE — ASSESSMENT & PLAN NOTE
IUD located within the cervix.  This is likely why pt started having some vaginal bleeding.  Pt has been on broad spectrum abx x 24 hours prior to arrival.  Discussed removing IUD (migrated and ), and pt agrees.  IUD easily removed, RN chaperone present

## 2022-12-02 NOTE — HPI
Amy Womack is a 42 y.o. female with a PMH  has a past medical history of Hypertension.  Presented to our facility as a transfer from [facility] for GYN consult for abnormal imaging found at that facility.  Patient presented to that facility on 11/30/2022 for worsening abdominal pain which progressively got worse since its onset on 11/26/2022.  Associated symptoms include nausea and vomiting with loose stools, fever, chills., sweats.  Patient also states she is been having burning sensation in the pelvic region left greater than right.  Patient currently rates pain 3/10.  At its worst 10/10.  Alleviating factors include pain medication.  ER workup performed at that facility was noted to have leukocytosis of 26.2.  CT imaging of abdomen and pelvis revealed:[1.  Bilateral TIAs, 2. IUD low and uterus, 3.  Left hydroureteronephrosis likely secondary to mass effect from abscesses, 4.  Questionable left upper quadrant intussusception with it a small-bowel obstruction versus developing ileus, 5.  Potential thrombus in left common and external iliac veins and left common femoral vein].  Bilateral lower extremity Dopplers revealed DVT and left external iliac vein.  Patient was initiated on Lovenox for DVT as well as Rocephin, Flagyl, and doxy for TOA.  Repeat lab work performed at our facility revealed the patient to have potassium level of 3.4, CBG of 137 mg/dL, lactic acid 1.2, and WBC of 26.22.  Patient also noted to have elevated blood pressure reading of 162/94.  Patient reports she had a remote history of hypertension, but has not been on medication in years.  Hospital Medicine was consulted for medical management of hypokalemia, hypertension, and DVT.    PCP: Primary Doctor No

## 2022-12-02 NOTE — ASSESSMENT & PLAN NOTE
- Lovenox 60mg BID  - Hospital medicine consulted for assistance with management, appreciate recs  - In the event of surgery anticoagulation will need to be held, and will need to consider IVC filter placement

## 2022-12-02 NOTE — ASSESSMENT & PLAN NOTE
- Hx of previous HTN managed with diet and exercise, not taking any daily BP meds  - Continue to monitor  - Consider PRN anti-hypertensive

## 2022-12-02 NOTE — ASSESSMENT & PLAN NOTE
-Suspected TOA based on exam findings, imaging, and labs with likely resulting ileus.  Admit to gyn.  Continue IV rocephin, flagyl, and doxycycline (these were started the evening of 11/30/22)  -F/u GC/CT  -HIV, syphillis, and hepatitis screening with AM labs  -Explained that we typically treat for 48-72 hours with IV antibiotics first, and that surgery would be indicated if pt does not respond.  Discussed possible need for hysterectomy with removal of both tubes, and possibly both ovaries.  Pt states she has completed childbearing

## 2022-12-02 NOTE — HPI
43 y/o  presents to our hospital as a transfer from [place] [facility].  Pt presented there on 22.  Symptoms started on  with nausea/vomiting and loose stools.  Was around some friends whose children had similar symptoms.  Over the next few days, her abdomen became progressively more distended, then started having pelvic pain (LLQ>RLQ) and fevers and chills.  Wednesday morning, she developed significant left groin and left upper thigh burning pain, so presented to the ED.  Work-up in the ED revealed a leukocytosis (WBC's 26.2), mild hypokalemia, and CT Abd/pelvis with multiple findings, but most significantly:  bilateral TOA's (3.6 x 7.2 cm on the right, and 6.6 x 7.5 cm on the left)  IUD low in the uterus  left hydrouretronephrosis likely due to mass effect from abscesses  Questionable LUQ intussusception with either SBO vs developing ileus.  Potential thrombus in left common and external iliac veins and left common femoral vein    Pelvic ultrasound: bilateral TOA's; IUD located within the cervix  LE Dopplers: DVT in left external iliac vein    Gyn hx significant for Mirena IUD in place for 6 years.  Achieved amenorrhea with it until 1.5 years ago, then has had regular, monthly periods since then.  Started having some light bleeding today.  Pt was sexually active with her boyfriend of 4 years, but they broke up a few months ago.  Not sexually active since then.  Does not suspect any infidelity prior to their break-up.  GC/CT from [place] pending.  22--wbc decreased to 20K; pain improved, abx, replace potassium

## 2022-12-02 NOTE — ASSESSMENT & PLAN NOTE
-Suspected TOA based on exam findings, imaging, and labs with likely resulting ileus.  - Continue IV rocephin, flagyl, and doxycycline (these were started the evening of 11/30/22)  - HIV and syphillis and GC negative  - Hepatitis screening pending  - CT with contrast pending  - Dr. Ledesma previously explained that we typically treat for 48-72 hours with IV antibiotics first, and that surgery would be indicated if pt does not respond.  Discussed possible need for hysterectomy with removal of both tubes, and possibly both ovaries.  Pt states she has completed childbearing

## 2022-12-02 NOTE — ASSESSMENT & PLAN NOTE
Currently being treated with Rocephin, Flagyl, and doxycycline.  Followed by gyn services as primary.  Plan:  -continue antibiotics  -analgesics as needed  -repeat labs per gyn recommendations

## 2022-12-03 PROCEDURE — 63600175 PHARM REV CODE 636 W HCPCS: Performed by: NURSE PRACTITIONER

## 2022-12-03 PROCEDURE — S0030 INJECTION, METRONIDAZOLE: HCPCS | Performed by: OBSTETRICS & GYNECOLOGY

## 2022-12-03 PROCEDURE — 63600175 PHARM REV CODE 636 W HCPCS: Performed by: OBSTETRICS & GYNECOLOGY

## 2022-12-03 PROCEDURE — 25000003 PHARM REV CODE 250: Performed by: OBSTETRICS & GYNECOLOGY

## 2022-12-03 PROCEDURE — 11000001 HC ACUTE MED/SURG PRIVATE ROOM

## 2022-12-03 PROCEDURE — 99231 SBSQ HOSP IP/OBS SF/LOW 25: CPT | Mod: GT,,, | Performed by: OBSTETRICS & GYNECOLOGY

## 2022-12-03 PROCEDURE — 99231 PR SUBSEQUENT HOSPITAL CARE,LEVL I: ICD-10-PCS | Mod: GT,,, | Performed by: OBSTETRICS & GYNECOLOGY

## 2022-12-03 RX ADMIN — DOXYCYCLINE 100 MG: 100 INJECTION, POWDER, LYOPHILIZED, FOR SOLUTION INTRAVENOUS at 09:12

## 2022-12-03 RX ADMIN — DULOXETINE HYDROCHLORIDE 20 MG: 20 CAPSULE, DELAYED RELEASE ORAL at 09:12

## 2022-12-03 RX ADMIN — ENOXAPARIN SODIUM 70 MG: 100 INJECTION SUBCUTANEOUS at 09:12

## 2022-12-03 RX ADMIN — METRONIDAZOLE 500 MG: 5 INJECTION, SOLUTION INTRAVENOUS at 06:12

## 2022-12-03 RX ADMIN — DEXTROSE, SODIUM CHLORIDE, AND POTASSIUM CHLORIDE: 5; .9; .15 INJECTION INTRAVENOUS at 05:12

## 2022-12-03 RX ADMIN — DEXTROSE, SODIUM CHLORIDE, AND POTASSIUM CHLORIDE: 5; .9; .15 INJECTION INTRAVENOUS at 01:12

## 2022-12-03 RX ADMIN — CEFTRIAXONE 1 G: 1 INJECTION, SOLUTION INTRAVENOUS at 05:12

## 2022-12-03 RX ADMIN — DEXTROSE, SODIUM CHLORIDE, AND POTASSIUM CHLORIDE: 5; .9; .15 INJECTION INTRAVENOUS at 09:12

## 2022-12-03 RX ADMIN — ONDANSETRON 8 MG: 8 TABLET, ORALLY DISINTEGRATING ORAL at 01:12

## 2022-12-03 RX ADMIN — HYDROMORPHONE HYDROCHLORIDE 1 MG: 2 INJECTION INTRAMUSCULAR; INTRAVENOUS; SUBCUTANEOUS at 09:12

## 2022-12-03 RX ADMIN — METRONIDAZOLE 500 MG: 5 INJECTION, SOLUTION INTRAVENOUS at 07:12

## 2022-12-03 NOTE — ASSESSMENT & PLAN NOTE
- Due to TOA's  - Continue broad spectrum abx  12/03/2022  Remains afebrile  Continue rocephin, flagyl, doxy  Cbc in am

## 2022-12-03 NOTE — SUBJECTIVE & OBJECTIVE
Interval History:   Patient reports feeling much better  Patient reports no problems overnight, still with vaginal bleeding, some clots, no prob void; tolerating regular diet, +ambulation     Scheduled Meds:   cefTRIAXone (ROCEPHIN) IVPB  1 g Intravenous Q24H    doxycycline (VIBRAMYCIN) IVPB  100 mg Intravenous Q12H    DULoxetine  20 mg Oral BID    enoxaparin  1 mg/kg Subcutaneous Q12H    metronidazole  500 mg Intravenous Q12H     Continuous Infusions:   dextrose 5 % and 0.9 % NaCl with KCl 20 mEq 125 mL/hr at 12/03/22 0912     PRN Meds:acetaminophen, HYDROmorphone, HYDROmorphone, influenza, ondansetron, prochlorperazine, sodium chloride 0.9%    Review of patient's allergies indicates:  No Known Allergies    Objective:     Vital Signs (Most Recent):  Temp: 98.9 °F (37.2 °C) (12/03/22 0908)  Pulse: 101 (12/03/22 0908)  Resp: 16 (12/03/22 0908)  BP: (!) 157/80 (12/03/22 0908)  SpO2: 97 % (12/03/22 0908)   Vital Signs (24h Range):  Temp:  [97.8 °F (36.6 °C)-98.9 °F (37.2 °C)] 98.9 °F (37.2 °C)  Pulse:  [101-113] 101  Resp:  [16-19] 16  SpO2:  [92 %-98 %] 97 %  BP: (118-171)/() 157/80     Weight: 64.8 kg (142 lb 13.7 oz)  Body mass index is 28.85 kg/m².  No LMP recorded.    I&O (Last 24H):  No intake or output data in the 24 hours ending 12/03/22 0929      Laboratory:  Recent Lab Results       None          I have personallly reviewed all pertinent lab results from the last 24 hours.    Diagnostic Results:  Labs: Reviewed  CT: Reviewed    Physical Exam:   Constitutional: She appears well-developed.     Eyes: Pupils are equal, round, and reactive to light. Conjunctivae and EOM are normal.      Pulmonary/Chest: Effort normal.        Abdominal: Soft. Bowel sounds are normal. She exhibits distension. There is no abdominal tenderness. There is no rebound and no guarding.     Genitourinary:    Genitourinary Comments: deferred             Musculoskeletal: Normal range of motion.       Neurological: She is alert.    Skin:  Skin is warm.    Psychiatric: She has a normal mood and affect.     Review of Systems

## 2022-12-03 NOTE — PROGRESS NOTES
[facility] Surg  Obstetrics & Gynecology  Progress Note    Patient Name: Amy Womack  MRN: [id number]  Admission Date: 2022  Primary Care Provider: Primary Doctor No  Principal Problem: TOA (tubo-ovarian abscess)    Subjective:     HPI:  41 y/o  presents to our hospital as a transfer from [place] [facility].  Pt presented there on 22.  Symptoms started on  with nausea/vomiting and loose stools.  Was around some friends whose children had similar symptoms.  Over the next few days, her abdomen became progressively more distended, then started having pelvic pain (LLQ>RLQ) and fevers and chills.  Wednesday morning, she developed significant left groin and left upper thigh burning pain, so presented to the ED.  Work-up in the ED revealed a leukocytosis (WBC's 26.2), mild hypokalemia, and CT Abd/pelvis with multiple findings, but most significantly:  1. bilateral TOA's (3.6 x 7.2 cm on the right, and 6.6 x 7.5 cm on the left)  2. IUD low in the uterus  3. left hydrouretronephrosis likely due to mass effect from abscesses  4. Questionable LUQ intussusception with either SBO vs developing ileus.  5. Potential thrombus in left common and external iliac veins and left common femoral vein    Pelvic ultrasound: bilateral TOA's; IUD located within the cervix  LE Dopplers: DVT in left external iliac vein    Gyn hx significant for Mirena IUD in place for 6 years.  Achieved amenorrhea with it until 1.5 years ago, then has had regular, monthly periods since then.  Started having some light bleeding today.  Pt was sexually active with her boyfriend of 4 years, but they broke up a few months ago.  Not sexually active since then.  Does not suspect any infidelity prior to their break-up.  GC/CT from [place] pending.        Interval History:   Patient reports feeling much better  Patient reports no problems overnight, still with vaginal bleeding, some clots, no prob void; tolerating regular  diet, +ambulation     Scheduled Meds:   cefTRIAXone (ROCEPHIN) IVPB  1 g Intravenous Q24H    doxycycline (VIBRAMYCIN) IVPB  100 mg Intravenous Q12H    DULoxetine  20 mg Oral BID    enoxaparin  1 mg/kg Subcutaneous Q12H    metronidazole  500 mg Intravenous Q12H     Continuous Infusions:   dextrose 5 % and 0.9 % NaCl with KCl 20 mEq 125 mL/hr at 12/03/22 0912     PRN Meds:acetaminophen, HYDROmorphone, HYDROmorphone, influenza, ondansetron, prochlorperazine, sodium chloride 0.9%    Review of patient's allergies indicates:  No Known Allergies    Objective:     Vital Signs (Most Recent):  Temp: 98.9 °F (37.2 °C) (12/03/22 0908)  Pulse: 101 (12/03/22 0908)  Resp: 16 (12/03/22 0908)  BP: (!) 157/80 (12/03/22 0908)  SpO2: 97 % (12/03/22 0908)   Vital Signs (24h Range):  Temp:  [97.8 °F (36.6 °C)-98.9 °F (37.2 °C)] 98.9 °F (37.2 °C)  Pulse:  [101-113] 101  Resp:  [16-19] 16  SpO2:  [92 %-98 %] 97 %  BP: (118-171)/() 157/80     Weight: 64.8 kg (142 lb 13.7 oz)  Body mass index is 28.85 kg/m².  No LMP recorded.    I&O (Last 24H):  No intake or output data in the 24 hours ending 12/03/22 0929      Laboratory:  Recent Lab Results       None          I have personallly reviewed all pertinent lab results from the last 24 hours.    Diagnostic Results:  Labs: Reviewed  CT: Reviewed    Physical Exam:   Constitutional: She appears well-developed.     Eyes: Pupils are equal, round, and reactive to light. Conjunctivae and EOM are normal.      Pulmonary/Chest: Effort normal.        Abdominal: Soft. Bowel sounds are normal. She exhibits distension. There is no abdominal tenderness. There is no rebound and no guarding.     Genitourinary:    Genitourinary Comments: deferred             Musculoskeletal: Normal range of motion.       Neurological: She is alert.    Skin: Skin is warm.    Psychiatric: She has a normal mood and affect.     Review of Systems      Assessment/Plan:     * TOA (tubo-ovarian abscess)  -Suspected TOA based  on exam findings, imaging, and labs with likely resulting ileus.  - Continue IV rocephin, flagyl, and doxycycline (these were started the evening of 11/30/22)  - HIV and syphillis and GC negative  - Hepatitis screening pending  - CT with contrast pending  - Dr. Ledesma previously explained that we typically treat for 48-72 hours with IV antibiotics first, and that surgery would be indicated if pt does not respond.  Discussed possible need for hysterectomy with removal of both tubes, and possibly both ovaries.  Pt states she has completed childbearing  12/3/22  Ct scan from 12/2/22  Interval slight reduction in size of bilateral complex multiloculated tubo-ovarian abscesses, on the right measuring 6.8 x 3.9 cm previously 7.5 x 3.9 cm, and on the left today 5.5 x 4.5 cm, previously 7 x 6 cm   Remains afebrile  Continue rocephin, flagyl, doxy      Elevated blood pressure reading  - Hx of previous HTN managed with diet and exercise, not taking any daily BP meds  - Continue to monitor  - Consider PRN anti-hypertensive     Hypokalemia  - Likely due to GI losses; replace through IV fluids   - K+ 3.3 on AM labs    Sepsis without acute organ dysfunction  - Due to TOA's  - Continue broad spectrum abx  12/03/2022  Remains afebrile  Continue rocephin, flagyl, doxy  Cbc in am    Acute deep vein thrombosis (DVT) of iliac vein of left lower extremity  - Lovenox 60mg BID  - Hospital medicine consulted for assistance with management, appreciate recs  - In the event of surgery anticoagulation will need to be held, and will need to consider IVC filter placement  12/03/2022  Continue lovenox bid  Continue management as per [facility] medicine    IUD migration  - IUD removed early in the morning on 12/2    Ileus, unspecified  Questionable intussusseption vs SBO, possible early ileus.  Dr. Ledesma discussed pt's case with Dr. Rick with surgery prior to pt's arrival. No surgical plan at this time, CT AP with contrast pending.   - Keep NPO  for now except for ice chips  12/03/2022   resolved  Tolerating diet  Passing gas; still with loose stool (x1)          Toyin Mobley MD  Obstetrics & Gynecology  [facility] - Med Surg

## 2022-12-03 NOTE — PLAN OF CARE
Problem: Adult Inpatient Plan of Care  Goal: Plan of Care Review  Outcome: Ongoing, Progressing  Goal: Patient-Specific Goal (Individualized)  Outcome: Ongoing, Progressing  Goal: Absence of Hospital-Acquired Illness or Injury  Outcome: Ongoing, Progressing  Goal: Optimal Comfort and Wellbeing  Outcome: Ongoing, Progressing  Goal: Readiness for Transition of Care  Outcome: Ongoing, Progressing     Problem: Adjustment to Illness (Sepsis/Septic Shock)  Goal: Optimal Coping  Outcome: Ongoing, Progressing     Problem: Bleeding (Sepsis/Septic Shock)  Goal: Absence of Bleeding  Outcome: Ongoing, Progressing     Problem: Glycemic Control Impaired (Sepsis/Septic Shock)  Goal: Blood Glucose Level Within Desired Range  Outcome: Ongoing, Progressing     Problem: Infection Progression (Sepsis/Septic Shock)  Goal: Absence of Infection Signs and Symptoms  Outcome: Ongoing, Progressing     Problem: Nutrition Impaired (Sepsis/Septic Shock)  Goal: Optimal Nutrition Intake  Outcome: Ongoing, Progressing     Problem: Pain Acute  Goal: Acceptable Pain Control and Functional Ability  Outcome: Ongoing, Progressing     Problem: Fall Injury Risk  Goal: Absence of Fall and Fall-Related Injury  Outcome: Ongoing, Progressing

## 2022-12-03 NOTE — ASSESSMENT & PLAN NOTE
-Suspected TOA based on exam findings, imaging, and labs with likely resulting ileus.  - Continue IV rocephin, flagyl, and doxycycline (these were started the evening of 11/30/22)  - HIV and syphillis and GC negative  - Hepatitis screening pending  - CT with contrast pending  - Dr. Ledesma previously explained that we typically treat for 48-72 hours with IV antibiotics first, and that surgery would be indicated if pt does not respond.  Discussed possible need for hysterectomy with removal of both tubes, and possibly both ovaries.  Pt states she has completed childbearing  12/3/22  Ct scan from 12/2/22  Interval slight reduction in size of bilateral complex multiloculated tubo-ovarian abscesses, on the right measuring 6.8 x 3.9 cm previously 7.5 x 3.9 cm, and on the left today 5.5 x 4.5 cm, previously 7 x 6 cm   Remains afebrile  Continue rocephin, flagyl, doxy

## 2022-12-03 NOTE — ASSESSMENT & PLAN NOTE
Questionable intussusseption vs SBO, possible early ileus.  Dr. Ledesma discussed pt's case with Dr. Rick with surgery prior to pt's arrival. No surgical plan at this time, CT AP with contrast pending.   - Keep NPO for now except for ice chips  12/03/2022   resolved  Tolerating diet  Passing gas; still with loose stool (x1)

## 2022-12-03 NOTE — ASSESSMENT & PLAN NOTE
- Lovenox 60mg BID  - Hospital medicine consulted for assistance with management, appreciate recs  - In the event of surgery anticoagulation will need to be held, and will need to consider IVC filter placement  12/03/2022  Continue lovenox bid  Continue management as per [facility] medicine

## 2022-12-03 NOTE — NURSING
"Pt remains free of falls/injury. Safety precautions maintained. Pt expressed "feeling pain in the abdomen and is tolerating until after the game".  Pt walked in the room and tolerated well.  Regular diet tolerated. VSS. No S/S of distress noted at this time. Bed alarm refused.12 hour chart check complete. Will continue to monitor.   "

## 2022-12-04 LAB
ANION GAP SERPL CALC-SCNC: 11 MMOL/L (ref 8–16)
BASOPHILS NFR BLD: 0 % (ref 0–1.9)
BUN SERPL-MCNC: 4 MG/DL (ref 6–20)
CALCIUM SERPL-MCNC: 8.1 MG/DL (ref 8.7–10.5)
CHLORIDE SERPL-SCNC: 97 MMOL/L (ref 95–110)
CO2 SERPL-SCNC: 30 MMOL/L (ref 23–29)
CREAT SERPL-MCNC: 0.5 MG/DL (ref 0.5–1.4)
DIFFERENTIAL METHOD: ABNORMAL
EOSINOPHIL NFR BLD: 1 % (ref 0–8)
ERYTHROCYTE [DISTWIDTH] IN BLOOD BY AUTOMATED COUNT: 16.2 % (ref 11.5–14.5)
EST. GFR  (NO RACE VARIABLE): >60 ML/MIN/1.73 M^2
GLUCOSE SERPL-MCNC: 89 MG/DL (ref 70–110)
HCT VFR BLD AUTO: 29.4 % (ref 37–48.5)
HGB BLD-MCNC: 9.2 G/DL (ref 12–16)
IMM GRANULOCYTES # BLD AUTO: ABNORMAL K/UL (ref 0–0.04)
IMM GRANULOCYTES NFR BLD AUTO: ABNORMAL % (ref 0–0.5)
LYMPHOCYTES NFR BLD: 9 % (ref 18–48)
MAGNESIUM SERPL-MCNC: 1.6 MG/DL (ref 1.6–2.6)
MCH RBC QN AUTO: 24 PG (ref 27–31)
MCHC RBC AUTO-ENTMCNC: 31.3 G/DL (ref 32–36)
MCV RBC AUTO: 77 FL (ref 82–98)
MONOCYTES NFR BLD: 2 % (ref 4–15)
NEUTROPHILS NFR BLD: 80 % (ref 38–73)
NEUTS BAND NFR BLD MANUAL: 8 %
NRBC BLD-RTO: 0 /100 WBC
PLATELET # BLD AUTO: 396 K/UL (ref 150–450)
PLATELET BLD QL SMEAR: ABNORMAL
PMV BLD AUTO: 10.4 FL (ref 9.2–12.9)
POTASSIUM SERPL-SCNC: 3.2 MMOL/L (ref 3.5–5.1)
RBC # BLD AUTO: 3.84 M/UL (ref 4–5.4)
SODIUM SERPL-SCNC: 138 MMOL/L (ref 136–145)
WBC # BLD AUTO: 20.77 K/UL (ref 3.9–12.7)

## 2022-12-04 PROCEDURE — 80048 BASIC METABOLIC PNL TOTAL CA: CPT | Performed by: OBSTETRICS & GYNECOLOGY

## 2022-12-04 PROCEDURE — 11000001 HC ACUTE MED/SURG PRIVATE ROOM

## 2022-12-04 PROCEDURE — 25000003 PHARM REV CODE 250: Performed by: OBSTETRICS & GYNECOLOGY

## 2022-12-04 PROCEDURE — 25000003 PHARM REV CODE 250: Performed by: STUDENT IN AN ORGANIZED HEALTH CARE EDUCATION/TRAINING PROGRAM

## 2022-12-04 PROCEDURE — 83735 ASSAY OF MAGNESIUM: CPT | Performed by: OBSTETRICS & GYNECOLOGY

## 2022-12-04 PROCEDURE — 85027 COMPLETE CBC AUTOMATED: CPT | Performed by: OBSTETRICS & GYNECOLOGY

## 2022-12-04 PROCEDURE — S0030 INJECTION, METRONIDAZOLE: HCPCS | Performed by: OBSTETRICS & GYNECOLOGY

## 2022-12-04 PROCEDURE — 36415 COLL VENOUS BLD VENIPUNCTURE: CPT | Performed by: OBSTETRICS & GYNECOLOGY

## 2022-12-04 PROCEDURE — 63600175 PHARM REV CODE 636 W HCPCS: Performed by: OBSTETRICS & GYNECOLOGY

## 2022-12-04 PROCEDURE — 63600175 PHARM REV CODE 636 W HCPCS: Performed by: STUDENT IN AN ORGANIZED HEALTH CARE EDUCATION/TRAINING PROGRAM

## 2022-12-04 PROCEDURE — 63600175 PHARM REV CODE 636 W HCPCS: Performed by: NURSE PRACTITIONER

## 2022-12-04 PROCEDURE — 85007 BL SMEAR W/DIFF WBC COUNT: CPT | Performed by: OBSTETRICS & GYNECOLOGY

## 2022-12-04 RX ORDER — LANOLIN ALCOHOL/MO/W.PET/CERES
1 CREAM (GRAM) TOPICAL DAILY
Status: DISCONTINUED | OUTPATIENT
Start: 2022-12-04 | End: 2022-12-05 | Stop reason: HOSPADM

## 2022-12-04 RX ORDER — OXYCODONE AND ACETAMINOPHEN 5; 325 MG/1; MG/1
1 TABLET ORAL EVERY 4 HOURS PRN
Status: DISCONTINUED | OUTPATIENT
Start: 2022-12-04 | End: 2022-12-05 | Stop reason: HOSPADM

## 2022-12-04 RX ORDER — SODIUM CHLORIDE, SODIUM LACTATE, POTASSIUM CHLORIDE, CALCIUM CHLORIDE 600; 310; 30; 20 MG/100ML; MG/100ML; MG/100ML; MG/100ML
INJECTION, SOLUTION INTRAVENOUS CONTINUOUS
Status: DISCONTINUED | OUTPATIENT
Start: 2022-12-04 | End: 2022-12-05 | Stop reason: HOSPADM

## 2022-12-04 RX ORDER — POTASSIUM CHLORIDE 20 MEQ/1
20 TABLET, EXTENDED RELEASE ORAL DAILY
Status: DISCONTINUED | OUTPATIENT
Start: 2022-12-04 | End: 2022-12-05 | Stop reason: HOSPADM

## 2022-12-04 RX ORDER — POTASSIUM CHLORIDE 7.45 MG/ML
10 INJECTION INTRAVENOUS
Status: DISCONTINUED | OUTPATIENT
Start: 2022-12-04 | End: 2022-12-04

## 2022-12-04 RX ORDER — MORPHINE SULFATE 2 MG/ML
2 INJECTION, SOLUTION INTRAMUSCULAR; INTRAVENOUS
Status: DISCONTINUED | OUTPATIENT
Start: 2022-12-04 | End: 2022-12-05 | Stop reason: HOSPADM

## 2022-12-04 RX ORDER — SODIUM CHLORIDE 9 MG/ML
INJECTION, SOLUTION INTRAVENOUS
Status: DISCONTINUED | OUTPATIENT
Start: 2022-12-04 | End: 2022-12-05 | Stop reason: HOSPADM

## 2022-12-04 RX ADMIN — DEXTROSE, SODIUM CHLORIDE, AND POTASSIUM CHLORIDE: 5; .9; .15 INJECTION INTRAVENOUS at 05:12

## 2022-12-04 RX ADMIN — METRONIDAZOLE 500 MG: 5 INJECTION, SOLUTION INTRAVENOUS at 06:12

## 2022-12-04 RX ADMIN — ENOXAPARIN SODIUM 70 MG: 100 INJECTION SUBCUTANEOUS at 09:12

## 2022-12-04 RX ADMIN — DOXYCYCLINE 100 MG: 100 INJECTION, POWDER, LYOPHILIZED, FOR SOLUTION INTRAVENOUS at 09:12

## 2022-12-04 RX ADMIN — FERROUS SULFATE TAB 325 MG (65 MG ELEMENTAL FE) 1 EACH: 325 (65 FE) TAB at 09:12

## 2022-12-04 RX ADMIN — DULOXETINE HYDROCHLORIDE 20 MG: 20 CAPSULE, DELAYED RELEASE ORAL at 09:12

## 2022-12-04 RX ADMIN — OXYCODONE HYDROCHLORIDE AND ACETAMINOPHEN 1 TABLET: 5; 325 TABLET ORAL at 11:12

## 2022-12-04 RX ADMIN — POTASSIUM CHLORIDE 20 MEQ: 1500 TABLET, EXTENDED RELEASE ORAL at 10:12

## 2022-12-04 RX ADMIN — OXYCODONE HYDROCHLORIDE AND ACETAMINOPHEN 1 TABLET: 5; 325 TABLET ORAL at 09:12

## 2022-12-04 RX ADMIN — SODIUM CHLORIDE, SODIUM LACTATE, POTASSIUM CHLORIDE, AND CALCIUM CHLORIDE: .6; .31; .03; .02 INJECTION, SOLUTION INTRAVENOUS at 09:12

## 2022-12-04 RX ADMIN — SODIUM CHLORIDE: 0.9 INJECTION, SOLUTION INTRAVENOUS at 09:12

## 2022-12-04 RX ADMIN — CEFTRIAXONE 1 G: 1 INJECTION, SOLUTION INTRAVENOUS at 06:12

## 2022-12-04 RX ADMIN — POTASSIUM CHLORIDE 10 MEQ: 7.46 INJECTION, SOLUTION INTRAVENOUS at 09:12

## 2022-12-04 NOTE — ASSESSMENT & PLAN NOTE
-Suspected TOA based on exam findings, imaging, and labs with likely resulting ileus.  - Continue IV rocephin, flagyl, and doxycycline (these were started the evening of 11/30/22)  - HIV and syphillis and GC negative  - Hepatitis screening pending  - CT with contrast pending  - Dr. Ledesma previously explained that we typically treat for 48-72 hours with IV antibiotics first, and that surgery would be indicated if pt does not respond.  Discussed possible need for hysterectomy with removal of both tubes, and possibly both ovaries.  Pt states she has completed childbearing  12/3/22  Ct scan from 12/2/22  Interval slight reduction in size of bilateral complex multiloculated tubo-ovarian abscesses, on the right measuring 6.8 x 3.9 cm previously 7.5 x 3.9 cm, and on the left today 5.5 x 4.5 cm, previously 7 x 6 cm   Remains afebrile  Continue rocephin, flagyl, doxy  HD#2  Remains afebrile  Continue rocephin, doxy, flagyl  Anticipate discharge tomorrow on oral abx for 14d with outpt f/u

## 2022-12-04 NOTE — SUBJECTIVE & OBJECTIVE
Interval History: No acute events overnight. Doing well this AM with no complaints at our encounter. Denies CP, SOB, Abdominal pain, fevers/chills. White count trending down on AM labs.     Review of Systems  Objective:     Vital Signs (Most Recent):  Temp: 96.8 °F (36 °C) (12/04/22 0740)  Pulse: 109 (12/04/22 0740)  Resp: 15 (12/04/22 0740)  BP: (!) 143/83 (12/04/22 0740)  SpO2: 96 % (12/04/22 0740)   Vital Signs (24h Range):  Temp:  [96.8 °F (36 °C)-99 °F (37.2 °C)] 96.8 °F (36 °C)  Pulse:  [102-121] 109  Resp:  [15-17] 15  SpO2:  [92 %-99 %] 96 %  BP: (128-166)/(75-84) 143/83     Weight: 70.1 kg (154 lb 8.7 oz)  Body mass index is 31.21 kg/m².    Intake/Output Summary (Last 24 hours) at 12/4/2022 1016  Last data filed at 12/4/2022 0931  Gross per 24 hour   Intake 4928.34 ml   Output --   Net 4928.34 ml      Physical Exam  GEN: No acute distress, pleasant, body habitus normal  HEENT: atraumatic and normocephalic  CARDS: regular rate and rhythm, no m/g, pulses palpable in LE  PULM: breathing comfortably on room air, chest symmetric, nonlabored, no abnormal breath sounds on auscultation  ABD: nontender, nondistended, soft, no organomegaly, BS+  Neuro: Alert and oriented x3, CN's I-IX grossly intact, sensation and motor intact; follows directions and answers questions appropriately    Significant Labs: All pertinent labs within the past 24 hours have been reviewed.  Blood Culture: No results for input(s): LABBLOO in the last 48 hours.  BMP:   Recent Labs   Lab 12/04/22  0656   GLU 89      K 3.2*   CL 97   CO2 30*   BUN 4*   CREATININE 0.5   CALCIUM 8.1*     CBC:   Recent Labs   Lab 12/04/22  0656   WBC 20.77*   HGB 9.2*   HCT 29.4*        CMP:   Recent Labs   Lab 12/04/22  0656      K 3.2*   CL 97   CO2 30*   GLU 89   BUN 4*   CREATININE 0.5   CALCIUM 8.1*   ANIONGAP 11       Significant Imaging: I have reviewed all pertinent imaging results/findings within the past 24 hours.      CT Abdomen Pelvis  With Contrast [[id number]] Resulted: 12/02/22 1042   Order Status: Completed Updated: 12/02/22 1045   Narrative:     EXAMINATION:   CT ABDOMEN PELVIS WITH CONTRAST     CLINICAL HISTORY:   Bowel obstruction suspected;     TECHNIQUE:   Low dose axial images, sagittal and coronal reformations were obtained from the lung bases to the pubic symphysis after  mL Omnipaque 350. Oral Omnipaque 350 also administered.     All CT scans at this location are performed using dose modulation techniques as appropriate to a performed exam including the following: Automated exposure control; adjustment of the mA and/or kV according to patient size.     COMPARISON:   11/30/2022     FINDINGS:   Heart: Normal in size. No pericardial effusion.     Lung Bases: Small left pleural effusion.  Passive and subsegmental atelectasis bilateral lower lobes.     Liver: Normal in size and attenuation, with no focal hepatic lesions.     Gallbladder: No calcified gallstones.     Bile Ducts: No evidence of dilated ducts.     Pancreas: No mass or peripancreatic fat stranding.     Spleen: Unremarkable.     Adrenals: Unremarkable.     Kidneys/ Ureters: Compared to 11/30/2022, interval resolution of left hydroureteronephrosis.     Bladder: Bladder partially collapsed limiting evaluation.  Wall thickening of the bladder suggesting cystitis.     Reproductive organs: Interval removal of the IUD.  Compared to 11/30/2022, interval slight reduction in severity of inflammation in the pelvis.  Interval slight reduction in size of bilateral complex multiloculated tubo-ovarian abscesses, on the right measuring 6.8 x 3.9 cm previously 7.5 x 3.9 cm, and on the left today 5.5 x 4.5 cm, previously 7 x 6 cm 11/30/2022.  Trace free fluid in the pelvis again noted.     GI Tract/Mesentery: Small-moderate sliding hiatal hernia.  No evidence of bowel obstruction.  Mild gas in the transverse colon.     Appendix: Not well visualized.  No evidence of appendicitis      Peritoneal Space:  No free air.     Retroperitoneum: No significant adenopathy.     Abdominal wall: Body wall anasarca     Vasculature: No significant atherosclerosis or aneurysm.     Bones: No acute fracture.

## 2022-12-04 NOTE — PROGRESS NOTES
[facility]  Obstetrics & Gynecology  Progress Note    Patient Name: Amy Womack  MRN: [id number]  Admission Date: 2022  Primary Care Provider: Primary Doctor No  Principal Problem: TOA (tubo-ovarian abscess)    Subjective:     HPI:  43 y/o  presents to our hospital as a transfer from [facility] ED.  Pt presented there on 22.  Symptoms started on  with nausea/vomiting and loose stools.  Was around some friends whose children had similar symptoms.  Over the next few days, her abdomen became progressively more distended, then started having pelvic pain (LLQ>RLQ) and fevers and chills.  Wednesday morning, she developed significant left groin and left upper thigh burning pain, so presented to the ED.  Work-up in the ED revealed a leukocytosis (WBC's 26.2), mild hypokalemia, and CT Abd/pelvis with multiple findings, but most significantly:  1. bilateral TOA's (3.6 x 7.2 cm on the right, and 6.6 x 7.5 cm on the left)  2. IUD low in the uterus  3. left hydrouretronephrosis likely due to mass effect from abscesses  4. Questionable LUQ intussusception with either SBO vs developing ileus.  5. Potential thrombus in left common and external iliac veins and left common femoral vein    Pelvic ultrasound: bilateral TOA's; IUD located within the cervix  LE Dopplers: DVT in left external iliac vein    Gyn hx significant for Mirena IUD in place for 6 years.  Achieved amenorrhea with it until 1.5 years ago, then has had regular, monthly periods since then.  Started having some light bleeding today.  Pt was sexually active with her boyfriend of 4 years, but they broke up a few months ago.  Not sexually active since then.  Does not suspect any infidelity prior to their break-up.  GC/CT from [facility] pending.  22--wbc decreased to 20K; pain improved, continue abx (rocephin, doxy, flagyl) , replace potassium      Interval History:   Patient reports no problems overnight, reports  pain still there but feels much better overall; moderate vaginal bleeding, no prob void; tolerating regular diet, +ambulation     Scheduled Meds:   cefTRIAXone (ROCEPHIN) IVPB  1 g Intravenous Q24H    doxycycline (VIBRAMYCIN) IVPB  100 mg Intravenous Q12H    DULoxetine  20 mg Oral BID    enoxaparin  1 mg/kg Subcutaneous Q12H    ferrous sulfate  1 tablet Oral Daily    metronidazole  500 mg Intravenous Q12H    potassium chloride  20 mEq Oral Daily     Continuous Infusions:   lactated ringers 100 mL/hr at 12/04/22 0916     PRN Meds:sodium chloride 0.9%, acetaminophen, influenza, morphine, ondansetron, oxyCODONE-acetaminophen, prochlorperazine, sodium chloride 0.9%    Review of patient's allergies indicates:  No Known Allergies    Objective:     Vital Signs (Most Recent):  Temp: 96.8 °F (36 °C) (12/04/22 0740)  Pulse: 109 (12/04/22 0740)  Resp: 15 (12/04/22 0740)  BP: (!) 143/83 (12/04/22 0740)  SpO2: 96 % (12/04/22 0740)   Vital Signs (24h Range):  Temp:  [96.8 °F (36 °C)-99 °F (37.2 °C)] 96.8 °F (36 °C)  Pulse:  [102-121] 109  Resp:  [15-17] 15  SpO2:  [92 %-99 %] 96 %  BP: (128-166)/(75-84) 143/83     Weight: 70.1 kg (154 lb 8.7 oz)  Body mass index is 31.21 kg/m².  No LMP recorded.    I&O (Last 24H):    Intake/Output Summary (Last 24 hours) at 12/4/2022 1058  Last data filed at 12/4/2022 0931  Gross per 24 hour   Intake 4928.34 ml   Output --   Net 4928.34 ml         Laboratory:  Recent Lab Results         12/04/22  0656        Anion Gap 11       Bands 8.0       Basophil % 0.0       BUN 4       Calcium 8.1       Chloride 97       CO2 30       Creatinine 0.5       Differential Method Manual       eGFR >60       Eosinophil % 1.0       Glucose 89       Gran % 80.0       Hematocrit 29.4       Hemoglobin 9.2       Immature Grans (Abs) CANCELED  Comment: Mild elevation in immature granulocytes is non specific and   can be seen in a variety of conditions including stress response,   acute inflammation, trauma and  pregnancy. Correlation with other   laboratory and clinical findings is essential.    Result canceled by the ancillary.         Immature Granulocytes CANCELED  Comment: Result canceled by the ancillary.       Lymph % 9.0       Magnesium 1.6       MCH 24.0       MCHC 31.3       MCV 77       Mono % 2.0       MPV 10.4       nRBC 0       Platelet Estimate Appears normal       Platelets 396  Comment: Results confirmed, test repeated       Potassium 3.2       RBC 3.84       RDW 16.2       Sodium 138       WBC 20.77             I have personallly reviewed all pertinent lab results from the last 24 hours.    Diagnostic Results:  Labs: Reviewed  CT: Reviewed    Physical Exam:   Constitutional: She appears well-developed.     Eyes: Pupils are equal, round, and reactive to light. Conjunctivae and EOM are normal.      Pulmonary/Chest: Effort normal.        Abdominal: Soft. Bowel sounds are normal. She exhibits distension. There is abdominal tenderness. There is no rebound and no guarding.             Musculoskeletal: Normal range of motion.       Neurological: She is alert.    Skin: Skin is warm.    Psychiatric: She has a normal mood and affect.     Review of Systems   Gastrointestinal:  Positive for bloating.   Genitourinary:  Positive for menstrual problem and pelvic pain.   Musculoskeletal:  Positive for leg pain.   All other systems reviewed and are negative.      Assessment/Plan:     * TOA (tubo-ovarian abscess)  -Suspected TOA based on exam findings, imaging, and labs with likely resulting ileus.  - Continue IV rocephin, flagyl, and doxycycline (these were started the evening of 11/30/22)  - HIV and syphillis and GC negative  - Hepatitis screening pending  - CT with contrast pending  - Dr. Ledesma previously explained that we typically treat for 48-72 hours with IV antibiotics first, and that surgery would be indicated if pt does not respond.  Discussed possible need for hysterectomy with removal of both tubes, and  possibly both ovaries.  Pt states she has completed childbearing  12/3/22  Ct scan from 12/2/22  Interval slight reduction in size of bilateral complex multiloculated tubo-ovarian abscesses, on the right measuring 6.8 x 3.9 cm previously 7.5 x 3.9 cm, and on the left today 5.5 x 4.5 cm, previously 7 x 6 cm   Remains afebrile  Continue rocephin, flagyl, doxy  HD#2  Remains afebrile  Continue rocephin, doxy, flagyl  Anticipate discharge tomorrow on oral abx for 14d with outpt f/u      Elevated blood pressure reading  - Hx of previous HTN managed with diet and exercise, not taking any daily BP meds  - Continue to monitor  - Consider PRN anti-hypertensive     Hypokalemia  - Likely due to GI losses; replace through IV fluids   - K+ 3.3 on AM labs  12/04/2022  K+ 3.2  Daily oral potassium    Sepsis without acute organ dysfunction  - Due to TOA's  - Continue broad spectrum abx  12/04/2022  Remains afebrile  Continue rocephin, flagyl, doxy  Wbc improved to 20K  Blood cx prelim negative    Acute deep vein thrombosis (DVT) of iliac vein of left lower extremity  - Lovenox 60mg BID  - Hospital medicine consulted for assistance with management, appreciate recs  - In the event of surgery anticoagulation will need to be held, and will need to consider IVC filter placement  12/04/2022  Continue lovenox bid  Continue management as per hosp medicine    IUD migration  - IUD removed early in the morning on 12/2    Ileus, unspecified  Questionable intussusseption vs SBO, possible early ileus.  Dr. Ledesma discussed pt's case with Dr. Rick with surgery prior to pt's arrival. No surgical plan at this time, CT AP with contrast pending.   - Keep NPO for now except for ice chips  12/04/2022   resolved  Tolerating diet  Passing gas; still with loose stool (x1)  12/04/2022  Reports stool soft formed x 1          Toyin Mobley MD  Obstetrics & Gynecology  [facility]

## 2022-12-04 NOTE — ASSESSMENT & PLAN NOTE
- Likely due to GI losses; replace through IV fluids   - K+ 3.3 on AM labs  12/04/2022  K+ 3.2  Daily oral potassium

## 2022-12-04 NOTE — HOSPITAL COURSE
42-year-old woman with no pertinent prior medical history who was admitted to OB Gyn inpatient service for tubo-ovarian abscess.  Hospital Medicine consult for medical management.  Primary team opted out of I&D and decided to treat conservatively with IV antibiotics.  On IV Rocephin, Flagyl, doxycycline-repeat CT scan these as her initiating therapy shows noticeable improvement of abscess.  Leukocytosis has also been slowly downtrending.  Pain controlled on oral analgesics.

## 2022-12-04 NOTE — ASSESSMENT & PLAN NOTE
Questionable intussusseption vs SBO, possible early ileus.  Dr. Ledesma discussed pt's case with Dr. Rick with surgery prior to pt's arrival. No surgical plan at this time, CT AP with contrast pending.   - Keep NPO for now except for ice chips  12/04/2022   resolved  Tolerating diet  Passing gas; still with loose stool (x1)  12/04/2022  Reports stool soft formed x 1

## 2022-12-04 NOTE — PLAN OF CARE
Care plan reviewed with patient. Been in bed sleeping. Free from falls. No other complaints made.

## 2022-12-04 NOTE — ASSESSMENT & PLAN NOTE
- Due to TOA's  - Continue broad spectrum abx  12/04/2022  Remains afebrile  Continue rocephin, flagyl, doxy  Wbc improved to 20K  Blood cx prelim negative

## 2022-12-04 NOTE — PROGRESS NOTES
[facility] Medicine  Progress Note    Patient Name: Amy Womack  MRN: [id number]  Patient Class: IP- Inpatient   Admission Date: 12/2/2022  Length of Stay: 2 days  Attending Physician: Dana Ledesma MD  Primary Care Provider: Primary Doctor No        Subjective:     Principal Problem:TOA (tubo-ovarian abscess)        HPI:  Amy Womack is a 42 y.o. female with a PMH  has a past medical history of Hypertension.  Presented to our facility as a transfer from [facility] for GYN consult for abnormal imaging found at that facility.  Patient presented to that facility on 11/30/2022 for worsening abdominal pain which progressively got worse since its onset on 11/26/2022.  Associated symptoms include nausea and vomiting with loose stools, fever, chills., sweats.  Patient also states she is been having burning sensation in the pelvic region left greater than right.  Patient currently rates pain 3/10.  At its worst 10/10.  Alleviating factors include pain medication.  ER workup performed at that facility was noted to have leukocytosis of 26.2.  CT imaging of abdomen and pelvis revealed:[1.  Bilateral TIAs, 2. IUD low and uterus, 3.  Left hydroureteronephrosis likely secondary to mass effect from abscesses, 4.  Questionable left upper quadrant intussusception with it a small-bowel obstruction versus developing ileus, 5.  Potential thrombus in left common and external iliac veins and left common femoral vein].  Bilateral lower extremity Dopplers revealed DVT and left external iliac vein.  Patient was initiated on Lovenox for DVT as well as Rocephin, Flagyl, and doxy for TOA.  Repeat lab work performed at our facility revealed the patient to have potassium level of 3.4, CBG of 137 mg/dL, lactic acid 1.2, and WBC of 26.22.  Patient also noted to have elevated blood pressure reading of 162/94.  Patient reports she had a remote history of hypertension, but has not been on medication in years.  Hospital  Medicine was consulted for medical management of hypokalemia, hypertension, and DVT.    PCP: Primary Doctor No          Overview/Hospital Course:  42-year-old woman with no pertinent prior medical history who was admitted to OB Gyn inpatient service for tubo-ovarian abscess.  Hospital Medicine consult for medical management.  Primary team opted out of I&D and decided to treat conservatively with IV antibiotics.  On IV Rocephin, Flagyl, doxycycline-repeat CT scan these as her initiating therapy shows noticeable improvement of abscess.  Leukocytosis has also been slowly downtrending.  Pain controlled on oral analgesics.      Interval History: No acute events overnight. Doing well this AM with no complaints at our encounter. Denies CP, SOB, Abdominal pain, fevers/chills. White count trending down on AM labs.     Review of Systems  Objective:     Vital Signs (Most Recent):  Temp: 96.8 °F (36 °C) (12/04/22 0740)  Pulse: 109 (12/04/22 0740)  Resp: 15 (12/04/22 0740)  BP: (!) 143/83 (12/04/22 0740)  SpO2: 96 % (12/04/22 0740)   Vital Signs (24h Range):  Temp:  [96.8 °F (36 °C)-99 °F (37.2 °C)] 96.8 °F (36 °C)  Pulse:  [102-121] 109  Resp:  [15-17] 15  SpO2:  [92 %-99 %] 96 %  BP: (128-166)/(75-84) 143/83     Weight: 70.1 kg (154 lb 8.7 oz)  Body mass index is 31.21 kg/m².    Intake/Output Summary (Last 24 hours) at 12/4/2022 1016  Last data filed at 12/4/2022 0931  Gross per 24 hour   Intake 4928.34 ml   Output --   Net 4928.34 ml      Physical Exam  GEN: No acute distress, pleasant, body habitus normal  HEENT: atraumatic and normocephalic  CARDS: regular rate and rhythm, no m/g, pulses palpable in LE  PULM: breathing comfortably on room air, chest symmetric, nonlabored, no abnormal breath sounds on auscultation  ABD: nontender, nondistended, soft, no organomegaly, BS+  Neuro: Alert and oriented x3, CN's I-IX grossly intact, sensation and motor intact; follows directions and answers questions appropriately    Significant  Labs: All pertinent labs within the past 24 hours have been reviewed.  Blood Culture: No results for input(s): LABBLOO in the last 48 hours.  BMP:   Recent Labs   Lab 12/04/22  0656   GLU 89      K 3.2*   CL 97   CO2 30*   BUN 4*   CREATININE 0.5   CALCIUM 8.1*     CBC:   Recent Labs   Lab 12/04/22  0656   WBC 20.77*   HGB 9.2*   HCT 29.4*        CMP:   Recent Labs   Lab 12/04/22  0656      K 3.2*   CL 97   CO2 30*   GLU 89   BUN 4*   CREATININE 0.5   CALCIUM 8.1*   ANIONGAP 11       Significant Imaging: I have reviewed all pertinent imaging results/findings within the past 24 hours.      CT Abdomen Pelvis With Contrast [[id number]] Resulted: 12/02/22 1042   Order Status: Completed Updated: 12/02/22 1045   Narrative:     EXAMINATION:   CT ABDOMEN PELVIS WITH CONTRAST     CLINICAL HISTORY:   Bowel obstruction suspected;     TECHNIQUE:   Low dose axial images, sagittal and coronal reformations were obtained from the lung bases to the pubic symphysis after  mL Omnipaque 350. Oral Omnipaque 350 also administered.     All CT scans at this location are performed using dose modulation techniques as appropriate to a performed exam including the following: Automated exposure control; adjustment of the mA and/or kV according to patient size.     COMPARISON:   11/30/2022     FINDINGS:   Heart: Normal in size. No pericardial effusion.     Lung Bases: Small left pleural effusion.  Passive and subsegmental atelectasis bilateral lower lobes.     Liver: Normal in size and attenuation, with no focal hepatic lesions.     Gallbladder: No calcified gallstones.     Bile Ducts: No evidence of dilated ducts.     Pancreas: No mass or peripancreatic fat stranding.     Spleen: Unremarkable.     Adrenals: Unremarkable.     Kidneys/ Ureters: Compared to 11/30/2022, interval resolution of left hydroureteronephrosis.     Bladder: Bladder partially collapsed limiting evaluation.  Wall thickening of the bladder suggesting  cystitis.     Reproductive organs: Interval removal of the IUD.  Compared to 11/30/2022, interval slight reduction in severity of inflammation in the pelvis.  Interval slight reduction in size of bilateral complex multiloculated tubo-ovarian abscesses, on the right measuring 6.8 x 3.9 cm previously 7.5 x 3.9 cm, and on the left today 5.5 x 4.5 cm, previously 7 x 6 cm 11/30/2022.  Trace free fluid in the pelvis again noted.     GI Tract/Mesentery: Small-moderate sliding hiatal hernia.  No evidence of bowel obstruction.  Mild gas in the transverse colon.     Appendix: Not well visualized.  No evidence of appendicitis     Peritoneal Space:  No free air.     Retroperitoneum: No significant adenopathy.     Abdominal wall: Body wall anasarca     Vasculature: No significant atherosclerosis or aneurysm.     Bones: No acute fracture.            Assessment/Plan:      * TOA (tubo-ovarian abscess)  --Leukocytosis improving  --Confirmed improvement on CT scan  --IV Rocephin, Flagyl, and doxycycline- continue as ordered  --Cultures NGTD  --pain/antiemetics/bowel regimen effective  --rest of management per primary team       Elevated blood pressure reading  Stable, normotensive with any active antihypertensives on board  Continue to monitor      Hypokalemia  -3.2 this AM- s/p IV KCL  -magnesium level added on to labs obtained today to assess        Sepsis without acute organ dysfunction  No longer in sepsis (see plan for TOA)    Acute deep vein thrombosis (DVT) of iliac vein of left lower extremity  -continue Lovenox b.i.d.      IUD migration  IUD was removed by gyn provider at bedside without difficulty.      Ileus, unspecified  --resolved  --tolerating diet- D5 1/2 NS transitioned to LR's  --monitor clinically      VTE Risk Mitigation (From admission, onward)         Ordered     enoxaparin injection 70 mg  Every 12 hours (non-standard times)         12/02/22 0651     IP VTE HIGH RISK PATIENT  Once         12/02/22 0124     Place  sequential compression device  Until discontinued         12/02/22 0124                 Discharge Planning   GRETCHEN:  per primary    Code Status: Full Code   Is the patient medically ready for discharge?:     Reason for patient still in hospital (select all that apply): Laboratory test and Treatment  Discharge Plan A: Home        Progressing as expected, continue IV abx. Now that there's evidence of improvement, can likely transition to PO antibiotics to complete therapy, but will defer to primary team. Will continue to follow while hospitalized      Candelario Ruelas MD  Department of Hospital Medicine   [facility]

## 2022-12-04 NOTE — SUBJECTIVE & OBJECTIVE
Interval History:   Patient reports no problems overnight, reports pain still there but feels much better overall; moderate vaginal bleeding, no prob void; tolerating regular diet, +ambulation     Scheduled Meds:   cefTRIAXone (ROCEPHIN) IVPB  1 g Intravenous Q24H    doxycycline (VIBRAMYCIN) IVPB  100 mg Intravenous Q12H    DULoxetine  20 mg Oral BID    enoxaparin  1 mg/kg Subcutaneous Q12H    ferrous sulfate  1 tablet Oral Daily    metronidazole  500 mg Intravenous Q12H    potassium chloride  20 mEq Oral Daily     Continuous Infusions:   lactated ringers 100 mL/hr at 12/04/22 0916     PRN Meds:sodium chloride 0.9%, acetaminophen, influenza, morphine, ondansetron, oxyCODONE-acetaminophen, prochlorperazine, sodium chloride 0.9%    Review of patient's allergies indicates:  No Known Allergies    Objective:     Vital Signs (Most Recent):  Temp: 96.8 °F (36 °C) (12/04/22 0740)  Pulse: 109 (12/04/22 0740)  Resp: 15 (12/04/22 0740)  BP: (!) 143/83 (12/04/22 0740)  SpO2: 96 % (12/04/22 0740)   Vital Signs (24h Range):  Temp:  [96.8 °F (36 °C)-99 °F (37.2 °C)] 96.8 °F (36 °C)  Pulse:  [102-121] 109  Resp:  [15-17] 15  SpO2:  [92 %-99 %] 96 %  BP: (128-166)/(75-84) 143/83     Weight: 70.1 kg (154 lb 8.7 oz)  Body mass index is 31.21 kg/m².  No LMP recorded.    I&O (Last 24H):    Intake/Output Summary (Last 24 hours) at 12/4/2022 1058  Last data filed at 12/4/2022 0931  Gross per 24 hour   Intake 4928.34 ml   Output --   Net 4928.34 ml         Laboratory:  Recent Lab Results         12/04/22  0656        Anion Gap 11       Bands 8.0       Basophil % 0.0       BUN 4       Calcium 8.1       Chloride 97       CO2 30       Creatinine 0.5       Differential Method Manual       eGFR >60       Eosinophil % 1.0       Glucose 89       Gran % 80.0       Hematocrit 29.4       Hemoglobin 9.2       Immature Grans (Abs) CANCELED  Comment: Mild elevation in immature granulocytes is non specific and   can be seen in a variety of conditions  including stress response,   acute inflammation, trauma and pregnancy. Correlation with other   laboratory and clinical findings is essential.    Result canceled by the ancillary.         Immature Granulocytes CANCELED  Comment: Result canceled by the ancillary.       Lymph % 9.0       Magnesium 1.6       MCH 24.0       MCHC 31.3       MCV 77       Mono % 2.0       MPV 10.4       nRBC 0       Platelet Estimate Appears normal       Platelets 396  Comment: Results confirmed, test repeated       Potassium 3.2       RBC 3.84       RDW 16.2       Sodium 138       WBC 20.77             I have personallly reviewed all pertinent lab results from the last 24 hours.    Diagnostic Results:  Labs: Reviewed  CT: Reviewed    Physical Exam:   Constitutional: She appears well-developed.     Eyes: Pupils are equal, round, and reactive to light. Conjunctivae and EOM are normal.      Pulmonary/Chest: Effort normal.        Abdominal: Soft. Bowel sounds are normal. She exhibits distension. There is abdominal tenderness. There is no rebound and no guarding.             Musculoskeletal: Normal range of motion.       Neurological: She is alert.    Skin: Skin is warm.    Psychiatric: She has a normal mood and affect.     Review of Systems   Gastrointestinal:  Positive for bloating.   Genitourinary:  Positive for menstrual problem and pelvic pain.   Musculoskeletal:  Positive for leg pain.   All other systems reviewed and are negative.

## 2022-12-04 NOTE — ASSESSMENT & PLAN NOTE
--Leukocytosis improving  --Confirmed improvement on CT scan  --IV Rocephin, Flagyl, and doxycycline- continue as ordered  --Cultures NGTD  --pain/antiemetics/bowel regimen effective  --rest of management per primary team

## 2022-12-04 NOTE — PLAN OF CARE
Problem: Adult Inpatient Plan of Care  Goal: Plan of Care Review  Outcome: Ongoing, Progressing  Goal: Patient-Specific Goal (Individualized)  Outcome: Ongoing, Progressing  Goal: Absence of Hospital-Acquired Illness or Injury  Outcome: Ongoing, Progressing  Goal: Optimal Comfort and Wellbeing  Outcome: Ongoing, Progressing  Goal: Readiness for Transition of Care  Outcome: Ongoing, Progressing     Problem: Adjustment to Illness (Sepsis/Septic Shock)  Goal: Optimal Coping  Outcome: Ongoing, Progressing     Problem: Bleeding (Sepsis/Septic Shock)  Goal: Absence of Bleeding  Outcome: Ongoing, Progressing     Problem: Glycemic Control Impaired (Sepsis/Septic Shock)  Goal: Blood Glucose Level Within Desired Range  Outcome: Ongoing, Progressing     Problem: Infection Progression (Sepsis/Septic Shock)  Goal: Absence of Infection Signs and Symptoms  Outcome: Ongoing, Progressing     Problem: Nutrition Impaired (Sepsis/Septic Shock)  Goal: Optimal Nutrition Intake  Outcome: Ongoing, Progressing     Problem: Pain Acute  Goal: Acceptable Pain Control and Functional Ability  Outcome: Ongoing, Progressing     Problem: Fall Injury Risk  Goal: Absence of Fall and Fall-Related Injury  Outcome: Ongoing, Progressing   Pt remains free from falls/injuries. Safety precautions maintained. POC reviewed with pt. Chart check completed.

## 2022-12-04 NOTE — PLAN OF CARE
Care plan reviewed with patient. Pain managed by PRN med. Free from falls. No other complaints made. All orders checked and reviewed.

## 2022-12-04 NOTE — ASSESSMENT & PLAN NOTE
- Lovenox 60mg BID  - Hospital medicine consulted for assistance with management, appreciate recs  - In the event of surgery anticoagulation will need to be held, and will need to consider IVC filter placement  12/04/2022  Continue lovenox bid  Continue management as per hosp medicine

## 2022-12-05 ENCOUNTER — TELEPHONE (OUTPATIENT)
Dept: OBSTETRICS AND GYNECOLOGY | Facility: CLINIC | Age: 42
End: 2022-12-05
Payer: OTHER GOVERNMENT

## 2022-12-05 VITALS
DIASTOLIC BLOOD PRESSURE: 75 MMHG | SYSTOLIC BLOOD PRESSURE: 136 MMHG | TEMPERATURE: 97 F | RESPIRATION RATE: 15 BRPM | HEART RATE: 107 BPM | WEIGHT: 156.31 LBS | BODY MASS INDEX: 31.51 KG/M2 | HEIGHT: 59 IN | OXYGEN SATURATION: 95 %

## 2022-12-05 LAB
ANION GAP SERPL CALC-SCNC: 11 MMOL/L (ref 8–16)
ANISOCYTOSIS BLD QL SMEAR: SLIGHT
BASOPHILS NFR BLD: 0 % (ref 0–1.9)
BUN SERPL-MCNC: 3 MG/DL (ref 6–20)
CALCIUM SERPL-MCNC: 8.3 MG/DL (ref 8.7–10.5)
CHLORIDE SERPL-SCNC: 100 MMOL/L (ref 95–110)
CO2 SERPL-SCNC: 30 MMOL/L (ref 23–29)
CREAT SERPL-MCNC: 0.5 MG/DL (ref 0.5–1.4)
DACRYOCYTES BLD QL SMEAR: ABNORMAL
DIFFERENTIAL METHOD: ABNORMAL
EOSINOPHIL NFR BLD: 0 % (ref 0–8)
ERYTHROCYTE [DISTWIDTH] IN BLOOD BY AUTOMATED COUNT: 16.1 % (ref 11.5–14.5)
EST. GFR  (NO RACE VARIABLE): >60 ML/MIN/1.73 M^2
GLUCOSE SERPL-MCNC: 86 MG/DL (ref 70–110)
HCT VFR BLD AUTO: 28.3 % (ref 37–48.5)
HGB BLD-MCNC: 8.9 G/DL (ref 12–16)
IMM GRANULOCYTES # BLD AUTO: ABNORMAL K/UL (ref 0–0.04)
IMM GRANULOCYTES NFR BLD AUTO: ABNORMAL % (ref 0–0.5)
LYMPHOCYTES NFR BLD: 15 % (ref 18–48)
MCH RBC QN AUTO: 24.2 PG (ref 27–31)
MCHC RBC AUTO-ENTMCNC: 31.4 G/DL (ref 32–36)
MCV RBC AUTO: 77 FL (ref 82–98)
MONOCYTES NFR BLD: 6 % (ref 4–15)
NEUTROPHILS NFR BLD: 74 % (ref 38–73)
NEUTS BAND NFR BLD MANUAL: 5 %
NRBC BLD-RTO: 0 /100 WBC
PLATELET # BLD AUTO: 432 K/UL (ref 150–450)
PLATELET BLD QL SMEAR: ABNORMAL
PMV BLD AUTO: 10 FL (ref 9.2–12.9)
POIKILOCYTOSIS BLD QL SMEAR: SLIGHT
POTASSIUM SERPL-SCNC: 3.5 MMOL/L (ref 3.5–5.1)
RBC # BLD AUTO: 3.68 M/UL (ref 4–5.4)
SCHISTOCYTES BLD QL SMEAR: PRESENT
SODIUM SERPL-SCNC: 141 MMOL/L (ref 136–145)
TARGETS BLD QL SMEAR: ABNORMAL
WBC # BLD AUTO: 16.2 K/UL (ref 3.9–12.7)

## 2022-12-05 PROCEDURE — 85027 COMPLETE CBC AUTOMATED: CPT | Performed by: STUDENT IN AN ORGANIZED HEALTH CARE EDUCATION/TRAINING PROGRAM

## 2022-12-05 PROCEDURE — 63600175 PHARM REV CODE 636 W HCPCS: Performed by: NURSE PRACTITIONER

## 2022-12-05 PROCEDURE — 25000003 PHARM REV CODE 250: Performed by: OBSTETRICS & GYNECOLOGY

## 2022-12-05 PROCEDURE — S0030 INJECTION, METRONIDAZOLE: HCPCS | Performed by: OBSTETRICS & GYNECOLOGY

## 2022-12-05 PROCEDURE — 85007 BL SMEAR W/DIFF WBC COUNT: CPT | Performed by: STUDENT IN AN ORGANIZED HEALTH CARE EDUCATION/TRAINING PROGRAM

## 2022-12-05 PROCEDURE — 36415 COLL VENOUS BLD VENIPUNCTURE: CPT | Performed by: STUDENT IN AN ORGANIZED HEALTH CARE EDUCATION/TRAINING PROGRAM

## 2022-12-05 PROCEDURE — 80048 BASIC METABOLIC PNL TOTAL CA: CPT | Performed by: STUDENT IN AN ORGANIZED HEALTH CARE EDUCATION/TRAINING PROGRAM

## 2022-12-05 RX ORDER — DOXYCYCLINE 100 MG/1
100 CAPSULE ORAL 2 TIMES DAILY
Qty: 28 CAPSULE | Refills: 0 | Status: SHIPPED | OUTPATIENT
Start: 2022-12-05 | End: 2022-12-19

## 2022-12-05 RX ORDER — DOXYCYCLINE 100 MG/1
100 CAPSULE ORAL 2 TIMES DAILY
Qty: 28 CAPSULE | Refills: 0 | OUTPATIENT
Start: 2022-12-05 | End: 2022-12-05 | Stop reason: SDUPTHER

## 2022-12-05 RX ORDER — METRONIDAZOLE 500 MG/1
500 TABLET ORAL EVERY 12 HOURS
Qty: 14 TABLET | Refills: 0 | Status: SHIPPED | OUTPATIENT
Start: 2022-12-05 | End: 2022-12-19

## 2022-12-05 RX ORDER — ENOXAPARIN SODIUM 100 MG/ML
1 INJECTION SUBCUTANEOUS EVERY 12 HOURS
Qty: 48 ML | Refills: 3 | OUTPATIENT
Start: 2022-12-05 | End: 2022-12-05 | Stop reason: SDUPTHER

## 2022-12-05 RX ORDER — DOXYCYCLINE 100 MG/1
100 CAPSULE ORAL 2 TIMES DAILY
Qty: 28 CAPSULE | Refills: 0 | Status: SHIPPED | OUTPATIENT
Start: 2022-12-05 | End: 2022-12-05 | Stop reason: SDUPTHER

## 2022-12-05 RX ORDER — ENOXAPARIN SODIUM 100 MG/ML
1 INJECTION SUBCUTANEOUS EVERY 12 HOURS
Qty: 48 ML | Refills: 3 | Status: SHIPPED | OUTPATIENT
Start: 2022-12-05 | End: 2022-12-05 | Stop reason: SDUPTHER

## 2022-12-05 RX ORDER — ENOXAPARIN SODIUM 100 MG/ML
1 INJECTION SUBCUTANEOUS EVERY 12 HOURS
Qty: 48 ML | Refills: 3 | Status: SHIPPED | OUTPATIENT
Start: 2022-12-05 | End: 2022-12-16

## 2022-12-05 RX ORDER — METRONIDAZOLE 500 MG/1
500 TABLET ORAL EVERY 12 HOURS
Qty: 14 TABLET | Refills: 0 | OUTPATIENT
Start: 2022-12-05 | End: 2022-12-05 | Stop reason: SDUPTHER

## 2022-12-05 RX ORDER — METRONIDAZOLE 500 MG/1
500 TABLET ORAL EVERY 12 HOURS
Qty: 14 TABLET | Refills: 0 | Status: SHIPPED | OUTPATIENT
Start: 2022-12-05 | End: 2022-12-05 | Stop reason: SDUPTHER

## 2022-12-05 RX ADMIN — METRONIDAZOLE 500 MG: 5 INJECTION, SOLUTION INTRAVENOUS at 05:12

## 2022-12-05 RX ADMIN — ENOXAPARIN SODIUM 70 MG: 100 INJECTION SUBCUTANEOUS at 08:12

## 2022-12-05 RX ADMIN — DOXYCYCLINE 100 MG: 100 INJECTION, POWDER, LYOPHILIZED, FOR SOLUTION INTRAVENOUS at 08:12

## 2022-12-05 RX ADMIN — DULOXETINE HYDROCHLORIDE 20 MG: 20 CAPSULE, DELAYED RELEASE ORAL at 08:12

## 2022-12-05 RX ADMIN — FERROUS SULFATE TAB 325 MG (65 MG ELEMENTAL FE) 1 EACH: 325 (65 FE) TAB at 08:12

## 2022-12-05 RX ADMIN — POTASSIUM CHLORIDE 20 MEQ: 1500 TABLET, EXTENDED RELEASE ORAL at 08:12

## 2022-12-05 NOTE — PLAN OF CARE
Problem: Adult Inpatient Plan of Care  Goal: Plan of Care Review  Outcome: Ongoing, Progressing  Goal: Patient-Specific Goal (Individualized)  Outcome: Ongoing, Progressing  Goal: Absence of Hospital-Acquired Illness or Injury  Outcome: Ongoing, Progressing  Goal: Optimal Comfort and Wellbeing  Outcome: Ongoing, Progressing  Goal: Readiness for Transition of Care  Outcome: Ongoing, Progressing     Problem: Adjustment to Illness (Sepsis/Septic Shock)  Goal: Optimal Coping  Outcome: Ongoing, Progressing     Problem: Bleeding (Sepsis/Septic Shock)  Goal: Absence of Bleeding  Outcome: Ongoing, Progressing     Problem: Glycemic Control Impaired (Sepsis/Septic Shock)  Goal: Blood Glucose Level Within Desired Range  Outcome: Ongoing, Progressing     Problem: Infection Progression (Sepsis/Septic Shock)  Goal: Absence of Infection Signs and Symptoms  Outcome: Ongoing, Progressing     Problem: Nutrition Impaired (Sepsis/Septic Shock)  Goal: Optimal Nutrition Intake  Outcome: Ongoing, Progressing     Problem: Pain Acute  Goal: Acceptable Pain Control and Functional Ability  Outcome: Ongoing, Progressing     Problem: Fall Injury Risk  Goal: Absence of Fall and Fall-Related Injury  Outcome: Ongoing, Progressing   Pt remains free from falls injuries this shift. Safety precautions maintained. Chart check completed. POC reviewed with pt

## 2022-12-05 NOTE — PROGRESS NOTES
[facility]  Obstetrics & Gynecology  Progress Note    Patient Name: Amy Womack  MRN: [id number]  Admission Date: 2022  Primary Care Provider: Primary Doctor No  Principal Problem: TOA (tubo-ovarian abscess)    Subjective:     HPI:  41 y/o  presents to our hospital as a transfer from [facility] ED.  Pt presented there on 22.  Symptoms started on  with nausea/vomiting and loose stools.  Was around some friends whose children had similar symptoms.  Over the next few days, her abdomen became progressively more distended, then started having pelvic pain (LLQ>RLQ) and fevers and chills.  Wednesday morning, she developed significant left groin and left upper thigh burning pain, so presented to the ED.  Work-up in the ED revealed a leukocytosis (WBC's 26.2), mild hypokalemia, and CT Abd/pelvis with multiple findings, but most significantly:  1. bilateral TOA's (3.6 x 7.2 cm on the right, and 6.6 x 7.5 cm on the left)  2. IUD low in the uterus  3. left hydrouretronephrosis likely due to mass effect from abscesses  4. Questionable LUQ intussusception with either SBO vs developing ileus.  5. Potential thrombus in left common and external iliac veins and left common femoral vein    Pelvic ultrasound: bilateral TOA's; IUD located within the cervix  LE Dopplers: DVT in left external iliac vein    Gyn hx significant for Mirena IUD in place for 6 years.  Achieved amenorrhea with it until 1.5 years ago, then has had regular, monthly periods since then.  Started having some light bleeding today.  Pt was sexually active with her boyfriend of 4 years, but they broke up a few months ago.  Not sexually active since then.  Does not suspect any infidelity prior to their break-up.  GC/CT from [facility] pending.  22--wbc decreased to 20K; pain improved, abx, replace potassium      Interval History: Patient doing well and meeting discharge criteria     Scheduled Meds:   cefTRIAXone  (ROCEPHIN) IVPB  1 g Intravenous Q24H    doxycycline (VIBRAMYCIN) IVPB  100 mg Intravenous Q12H    DULoxetine  20 mg Oral BID    enoxaparin  1 mg/kg Subcutaneous Q12H    ferrous sulfate  1 tablet Oral Daily    metronidazole  500 mg Intravenous Q12H    potassium chloride  20 mEq Oral Daily     Continuous Infusions:   lactated ringers 100 mL/hr at 12/04/22 0916     PRN Meds:sodium chloride 0.9%, acetaminophen, influenza, morphine, ondansetron, oxyCODONE-acetaminophen, prochlorperazine, sodium chloride 0.9%    Review of patient's allergies indicates:  No Known Allergies    Objective:     Vital Signs (Most Recent):  Temp: 96.6 °F (35.9 °C) (12/05/22 0743)  Pulse: 107 (12/05/22 0743)  Resp: 15 (12/05/22 0743)  BP: 136/75 (12/05/22 0743)  SpO2: 95 % (12/05/22 0743) Vital Signs (24h Range):  Temp:  [96.6 °F (35.9 °C)-98.8 °F (37.1 °C)] 96.6 °F (35.9 °C)  Pulse:  [102-107] 107  Resp:  [14-17] 15  SpO2:  [93 %-100 %] 95 %  BP: (132-162)/(69-82) 136/75     Weight: 70.9 kg (156 lb 4.9 oz)  Body mass index is 31.57 kg/m².  No LMP recorded.    I&O (Last 24H):    Intake/Output Summary (Last 24 hours) at 12/5/2022 0842  Last data filed at 12/5/2022 0247  Gross per 24 hour   Intake 1010.4 ml   Output --   Net 1010.4 ml         Laboratory:  CBC:   Recent Labs   Lab 12/05/22 0513   WBC 16.20*   RBC 3.68*   HGB 8.9*   HCT 28.3*      MCV 77*   MCH 24.2*   MCHC 31.4*     CMP:   Recent Labs   Lab 12/05/22 0513   GLU 86   CALCIUM 8.3*      K 3.5   CO2 30*      BUN 3*   CREATININE 0.5       Diagnostic Results:  CT: Reviewed    Physical Exam:   Constitutional: She is oriented to person, place, and time. She appears well-developed and well-nourished.    HENT:   Head: Normocephalic and atraumatic.    Eyes: Pupils are equal, round, and reactive to light. EOM are normal.     Cardiovascular:  Normal rate and regular rhythm.             Pulmonary/Chest: Effort normal and breath sounds normal.        Abdominal: Soft.  Bowel sounds are normal.     Genitourinary:    Vagina and uterus normal.             Musculoskeletal: Normal range of motion and moves all extremeties.       Neurological: She is alert and oriented to person, place, and time.    Skin: Skin is warm and dry.    Psychiatric: She has a normal mood and affect.     Review of Systems   Constitutional:  Negative for activity change, appetite change, chills, diaphoresis, fatigue, fever and unexpected weight change.   HENT:  Negative for mouth sores and tinnitus.    Eyes:  Negative for discharge and visual disturbance.   Respiratory:  Negative for cough, shortness of breath and wheezing.    Cardiovascular:  Negative for chest pain, palpitations and leg swelling.   Gastrointestinal:  Negative for abdominal pain, bloating, blood in stool, constipation, diarrhea, nausea and vomiting.   Endocrine: Negative for diabetes, hair loss, hot flashes, hyperthyroidism and hypothyroidism.   Genitourinary:  Negative for dysuria, flank pain, frequency, genital sores, hematuria, urgency, vaginal bleeding, vaginal discharge, vaginal pain, postcoital bleeding and vaginal odor.   Musculoskeletal:  Negative for back pain, joint swelling and myalgias.   Integumentary:  Negative for rash, acne, breast mass, nipple discharge and breast skin changes.   Neurological:  Negative for seizures, syncope, numbness and headaches.   Hematological:  Negative for adenopathy. Does not bruise/bleed easily.   Psychiatric/Behavioral:  Negative for depression and sleep disturbance. The patient is not nervous/anxious.    Breast: Negative for mass, mastodynia, nipple discharge and skin changes      Assessment/Plan:     * TOA (tubo-ovarian abscess)  -Suspected TOA based on exam findings, imaging, and labs with likely resulting ileus.  - Continue IV rocephin, flagyl, and doxycycline (these were started the evening of 11/30/22)  - HIV and syphillis and GC negative  - Hepatitis screening pending  - CT with contrast  pending  - Dr. Ledesma previously explained that we typically treat for 48-72 hours with IV antibiotics first, and that surgery would be indicated if pt does not respond.  Discussed possible need for hysterectomy with removal of both tubes, and possibly both ovaries.  Pt states she has completed childbearing  12/3/22  Ct scan from 12/2/22  Interval slight reduction in size of bilateral complex multiloculated tubo-ovarian abscesses, on the right measuring 6.8 x 3.9 cm previously 7.5 x 3.9 cm, and on the left today 5.5 x 4.5 cm, previously 7 x 6 cm   Remains afebrile  Continue rocephin, flagyl, doxy  HD#2  Remains afebrile  Continue rocephin, doxy, flagyl  Anticipate discharge tomorrow on oral abx for 14d with outpt f/u  12/5/22   Patient ready for discharge; will plan for oral flagyl and doxy. Will send home with lovenox       Elevated blood pressure reading  - Hx of previous HTN managed with diet and exercise, not taking any daily BP meds  - Continue to monitor  - Consider PRN anti-hypertensive       Acute deep vein thrombosis (DVT) of iliac vein of left lower extremity  - Lovenox 60mg BID  - Hospital medicine consulted for assistance with management, appreciate recs  - In the event of surgery anticoagulation will need to be held, and will need to consider IVC filter placement  12/05/2022  Continue lovenox bid  Continue management as per hosp medicine    IUD migration  - IUD removed early in the morning on 12/2            Susana Chino MD  Obstetrics & Gynecology  [facility] - Med S[facility]

## 2022-12-05 NOTE — SUBJECTIVE & OBJECTIVE
Interval History: Patient doing well and meeting discharge criteria     Scheduled Meds:   cefTRIAXone (ROCEPHIN) IVPB  1 g Intravenous Q24H    doxycycline (VIBRAMYCIN) IVPB  100 mg Intravenous Q12H    DULoxetine  20 mg Oral BID    enoxaparin  1 mg/kg Subcutaneous Q12H    ferrous sulfate  1 tablet Oral Daily    metronidazole  500 mg Intravenous Q12H    potassium chloride  20 mEq Oral Daily     Continuous Infusions:   lactated ringers 100 mL/hr at 12/04/22 0916     PRN Meds:sodium chloride 0.9%, acetaminophen, influenza, morphine, ondansetron, oxyCODONE-acetaminophen, prochlorperazine, sodium chloride 0.9%    Review of patient's allergies indicates:  No Known Allergies    Objective:     Vital Signs (Most Recent):  Temp: 96.6 °F (35.9 °C) (12/05/22 0743)  Pulse: 107 (12/05/22 0743)  Resp: 15 (12/05/22 0743)  BP: 136/75 (12/05/22 0743)  SpO2: 95 % (12/05/22 0743) Vital Signs (24h Range):  Temp:  [96.6 °F (35.9 °C)-98.8 °F (37.1 °C)] 96.6 °F (35.9 °C)  Pulse:  [102-107] 107  Resp:  [14-17] 15  SpO2:  [93 %-100 %] 95 %  BP: (132-162)/(69-82) 136/75     Weight: 70.9 kg (156 lb 4.9 oz)  Body mass index is 31.57 kg/m².  No LMP recorded.    I&O (Last 24H):    Intake/Output Summary (Last 24 hours) at 12/5/2022 0842  Last data filed at 12/5/2022 0247  Gross per 24 hour   Intake 1010.4 ml   Output --   Net 1010.4 ml         Laboratory:  CBC:   Recent Labs   Lab 12/05/22 0513   WBC 16.20*   RBC 3.68*   HGB 8.9*   HCT 28.3*      MCV 77*   MCH 24.2*   MCHC 31.4*     CMP:   Recent Labs   Lab 12/05/22 0513   GLU 86   CALCIUM 8.3*      K 3.5   CO2 30*      BUN 3*   CREATININE 0.5       Diagnostic Results:  CT: Reviewed    Physical Exam:   Constitutional: She is oriented to person, place, and time. She appears well-developed and well-nourished.    HENT:   Head: Normocephalic and atraumatic.    Eyes: Pupils are equal, round, and reactive to light. EOM are normal.     Cardiovascular:  Normal rate and regular rhythm.              Pulmonary/Chest: Effort normal and breath sounds normal.        Abdominal: Soft. Bowel sounds are normal.     Genitourinary:    Vagina and uterus normal.             Musculoskeletal: Normal range of motion and moves all extremeties.       Neurological: She is alert and oriented to person, place, and time.    Skin: Skin is warm and dry.    Psychiatric: She has a normal mood and affect.     Review of Systems   Constitutional:  Negative for activity change, appetite change, chills, diaphoresis, fatigue, fever and unexpected weight change.   HENT:  Negative for mouth sores and tinnitus.    Eyes:  Negative for discharge and visual disturbance.   Respiratory:  Negative for cough, shortness of breath and wheezing.    Cardiovascular:  Negative for chest pain, palpitations and leg swelling.   Gastrointestinal:  Negative for abdominal pain, bloating, blood in stool, constipation, diarrhea, nausea and vomiting.   Endocrine: Negative for diabetes, hair loss, hot flashes, hyperthyroidism and hypothyroidism.   Genitourinary:  Negative for dysuria, flank pain, frequency, genital sores, hematuria, urgency, vaginal bleeding, vaginal discharge, vaginal pain, postcoital bleeding and vaginal odor.   Musculoskeletal:  Negative for back pain, joint swelling and myalgias.   Integumentary:  Negative for rash, acne, breast mass, nipple discharge and breast skin changes.   Neurological:  Negative for seizures, syncope, numbness and headaches.   Hematological:  Negative for adenopathy. Does not bruise/bleed easily.   Psychiatric/Behavioral:  Negative for depression and sleep disturbance. The patient is not nervous/anxious.    Breast: Negative for mass, mastodynia, nipple discharge and skin changes

## 2022-12-05 NOTE — TELEPHONE ENCOUNTER
----- Message from Michael Montelongo sent at 12/5/2022 12:19 PM CST -----  Name of Who is Calling: MIRNA BROWN [[id number]]            What is the request in detail: Patient is requesting call back to schedule hospital f/u within a week              Can the clinic reply by [facility]: no              What Number to Call Back if not in [place]: [phone]

## 2022-12-05 NOTE — PLAN OF CARE
[facility] - Med [facility]  Discharge Final Note    Primary Care Provider: Primary Doctor No    Expected Discharge Date: 12/5/2022    Final Discharge Note (most recent)       Final Note - 12/05/22 1222          Final Note    Assessment Type Final Discharge Note     Anticipated Discharge Disposition Home or Self Care     Hospital Resources/Appts/Education Provided Appointments scheduled by Navigator/Coordinator;Appointments scheduled and added to AVS                     Important Message from Medicare             Contact Terra Kent MD   Specialty: Obstetrics and Gynecology, Obstetrics, Gynecology    [address] [address]   Phone: [phone]       Next Steps: Schedule an appointment as soon as possible for a visit          [facility] SCHEDULING F/U AND WILL CONTACT PATIENT.

## 2022-12-05 NOTE — ASSESSMENT & PLAN NOTE
-Suspected TOA based on exam findings, imaging, and labs with likely resulting ileus.  - Continue IV rocephin, flagyl, and doxycycline (these were started the evening of 11/30/22)  - HIV and syphillis and GC negative  - Hepatitis screening pending  - CT with contrast pending  - Dr. Ledesma previously explained that we typically treat for 48-72 hours with IV antibiotics first, and that surgery would be indicated if pt does not respond.  Discussed possible need for hysterectomy with removal of both tubes, and possibly both ovaries.  Pt states she has completed childbearing  12/3/22  Ct scan from 12/2/22  Interval slight reduction in size of bilateral complex multiloculated tubo-ovarian abscesses, on the right measuring 6.8 x 3.9 cm previously 7.5 x 3.9 cm, and on the left today 5.5 x 4.5 cm, previously 7 x 6 cm   Remains afebrile  Continue rocephin, flagyl, doxy  HD#2  Remains afebrile  Continue rocephin, doxy, flagyl  Anticipate discharge tomorrow on oral abx for 14d with outpt f/u  12/5/22   Patient ready for discharge; will plan for oral flagyl and doxy. Will send home with lovenox

## 2022-12-05 NOTE — NURSING
Discharge summary, health teachings and instructions given to patient--verbalized understanding. IV removed-no complications noted.  All questions answered with regards to discharge instructions. Reminded of the importance of follow-up appointments--patient will call nearest [facility] clinic for a follow up appointment. Written prescription with patient.

## 2022-12-05 NOTE — DISCHARGE SUMMARY
[facility]  Obstetrics & Gynecology  Discharge Summary    Patient Name: Amy Womack  MRN: [id number]  Admission Date: 2022  Hospital Length of Stay: 3 days  Discharge Date and Time:  2022 8:50 AM  Attending Physician: Dana Ledesma MD   Discharging Provider: Susana Chino MD  Primary Care Provider: Primary Doctor No    HPI:  41 y/o  presents to our hospital as a transfer from [place] ED.  Pt presented there on 22.  Symptoms started on  with nausea/vomiting and loose stools.  Was around some friends whose children had similar symptoms.  Over the next few days, her abdomen became progressively more distended, then started having pelvic pain (LLQ>RLQ) and fevers and chills.  Wednesday morning, she developed significant left groin and left upper thigh burning pain, so presented to the ED.  Work-up in the ED revealed a leukocytosis (WBC's 26.2), mild hypokalemia, and CT Abd/pelvis with multiple findings, but most significantly:  1. bilateral TOA's (3.6 x 7.2 cm on the right, and 6.6 x 7.5 cm on the left)  2. IUD low in the uterus  3. left hydrouretronephrosis likely due to mass effect from abscesses  4. Questionable LUQ intussusception with either SBO vs developing ileus.  5. Potential thrombus in left common and external iliac veins and left common femoral vein    Pelvic ultrasound: bilateral TOA's; IUD located within the cervix  LE Dopplers: DVT in left external iliac vein    Gyn hx significant for Mirena IUD in place for 6 years.  Achieved amenorrhea with it until 1.5 years ago, then has had regular, monthly periods since then.  Started having some light bleeding today.  Pt was sexually active with her boyfriend of 4 years, but they broke up a few months ago.  Not sexually active since then.  Does not suspect any infidelity prior to their break-up.  GC/CT from [place] pending.  22--wbc decreased to 20K; pain improved, abx, replace  potassium      Hospital Course:  12/2/22: Transferred overnight from [place] for TOA. Continue IV abx. CT AP with contrast results pending. WBC 26.5. Patient states that she feels better than she did earlier this week. IUD removed overnight by Dr. Trujillo.   12/2/22--bilateral toa persist on ct scan, with Interval slight reduction in size of bilateral complex multiloculated tubo-ovarian abscesses, on the right measuring 6.8 x 3.9 cm previously 7.5 x 3.9 cm, and on the left today 5.5 x 4.5 cm, previously 7 x 6 cm     12/5/22- patient reports her pain has significantly improved. Tolerating oral intake. Meeting discharge criteria      Goals of Care Treatment Preferences:  Code Status: Full Code      * No surgery found *     Consults (From admission, onward)        Status Ordering Provider     Inpatient consult to Hospitalist  Once        Provider:  Tom Barakat MD    Acknowledged DUONG TRUJILLO          Significant Diagnostic Studies: Labs:   CBC   Recent Labs   Lab 12/04/22  0656 12/05/22  0513   WBC 20.77* 16.20*   HGB 9.2* 8.9*   HCT 29.4* 28.3*    432         Pending Diagnostic Studies:     None        Final Active Diagnoses:    Diagnosis Date Noted POA    PRINCIPAL PROBLEM:  TOA (tubo-ovarian abscess) [N70.93] 12/02/2022 Yes    Ileus, unspecified [K56.7] 12/02/2022 Yes    IUD migration [T83.32XA] 12/02/2022 Yes    Acute deep vein thrombosis (DVT) of iliac vein of left lower extremity [I82.422] 12/02/2022 Yes    Sepsis without acute organ dysfunction [A41.9] 12/02/2022 Yes    Hypokalemia [E87.6] 12/02/2022 Yes    Elevated blood pressure reading [R03.0] 12/02/2022 Yes      Problems Resolved During this Admission:        Discharged Condition: good    Disposition: Home or Self Care    Follow Up: Patient to get MD appt local    Patient Instructions:      Diet Adult Regular     Lifting restrictions     Pelvic Rest     No dressing needed     Notify your health care provider if you experience  any of the following:  temperature >100.4     Notify your health care provider if you experience any of the following:  persistent nausea and vomiting or diarrhea     Notify your health care provider if you experience any of the following:  severe uncontrolled pain     Notify your health care provider if you experience any of the following:  redness, tenderness, or signs of infection (pain, swelling, redness, odor or green/yellow discharge around incision site)     Notify your health care provider if you experience any of the following:  difficulty breathing or increased cough     Medications:  Reconciled Home Medications:      Medication List      START taking these medications    doxycycline 100 MG Cap  Commonly known as: VIBRAMYCIN  Take 1 capsule (100 mg total) by mouth 2 (two) times daily. for 14 days     enoxaparin 80 mg/0.8 mL Syrg  Commonly known as: LOVENOX  Inject 0.7 mLs (70 mg total) into the skin every 12 (twelve) hours.     metroNIDAZOLE 500 MG tablet  Commonly known as: FlagyL  Take 1 tablet (500 mg total) by mouth every 12 (twelve) hours. for 14 days        CONTINUE taking these medications    CYMBALTA ORAL  Take by mouth.     OZEMPIC SUBQ  Inject into the skin.            Susana Chino MD  Obstetrics & Gynecology  [facility]

## 2022-12-05 NOTE — ASSESSMENT & PLAN NOTE
- Due to TOA's  - Continue broad spectrum abx  12/05/2022  Remains afebrile  Oral rocephin, flagyl, doxy  Wbc improved to 20K  Blood cx prelim negative

## 2022-12-12 ENCOUNTER — TELEPHONE (OUTPATIENT)
Dept: OBSTETRICS AND GYNECOLOGY | Facility: CLINIC | Age: 42
End: 2022-12-12
Payer: OTHER GOVERNMENT

## 2022-12-12 ENCOUNTER — NURSE TRIAGE (OUTPATIENT)
Dept: ADMINISTRATIVE | Facility: CLINIC | Age: 42
End: 2022-12-12
Payer: OTHER GOVERNMENT

## 2022-12-12 NOTE — TELEPHONE ENCOUNTER
Pt. Called in stating that she she is having left sided pain described as dull and constant that is mild to moderate in pain. States that she was given antibiotics for tubo ovarian abscess. Pt advised to be seen in office today. Pt states she has appointment with Dr. Whiteside on Friday that was made today. Will send message to office. If possible for sooner appointment. Pt advised can be seen in ED/UC as well. RR ,and [facility] offered as well.    Reason for Disposition   MODERATE pain (e.g., interferes with normal activities or awakens from sleep)    Additional Information   Negative: Passed out (i.e., lost consciousness, collapsed and was not responding)   Negative: Shock suspected (e.g., cold/pale/clammy skin, too weak to stand, low BP, rapid pulse)   Negative: Sounds like a life-threatening emergency to the triager   Negative: SEVERE pain (e.g., excruciating, scale 8-10) and present > 1 hour   Negative: Sudden onset of severe flank pain and age > 60 years   Negative: Abdominal pain and age > 60 years   Negative: Unable to urinate (or only a few drops) > 4 hours and bladder feels very full (e.g., palpable bladder or strong urge to urinate)   Negative: Pain radiates into groin, scrotum   Negative: Blood in urine (red, pink, or tea-colored)   Negative: Vomiting   Negative: Weakness of a leg or foot (e.g., unable to bear weight, dragging foot)   Negative: Patient sounds very sick or weak to the triager   Negative: Fever > 100.4 F (38.0 C)   Negative: Pain or burning with passing urine (urination)    Protocols used: Flank Pain-A-OH

## 2022-12-14 ENCOUNTER — TELEPHONE (OUTPATIENT)
Dept: OBSTETRICS AND GYNECOLOGY | Facility: CLINIC | Age: 42
End: 2022-12-14
Payer: OTHER GOVERNMENT

## 2022-12-14 ENCOUNTER — OFFICE VISIT (OUTPATIENT)
Dept: INTERNAL MEDICINE | Facility: CLINIC | Age: 42
End: 2022-12-14
Attending: FAMILY MEDICINE
Payer: OTHER GOVERNMENT

## 2022-12-14 ENCOUNTER — HOSPITAL ENCOUNTER (EMERGENCY)
Facility: HOSPITAL | Age: 42
Discharge: HOME | End: 2022-12-14
Attending: EMERGENCY MEDICINE
Payer: OTHER GOVERNMENT

## 2022-12-14 VITALS
TEMPERATURE: 98 F | SYSTOLIC BLOOD PRESSURE: 181 MMHG | HEIGHT: 59 IN | WEIGHT: 147 LBS | BODY MASS INDEX: 29.64 KG/M2 | HEART RATE: 107 BPM | DIASTOLIC BLOOD PRESSURE: 94 MMHG | RESPIRATION RATE: 18 BRPM | OXYGEN SATURATION: 98 %

## 2022-12-14 VITALS
DIASTOLIC BLOOD PRESSURE: 105 MMHG | OXYGEN SATURATION: 98 % | HEIGHT: 59 IN | WEIGHT: 138.88 LBS | SYSTOLIC BLOOD PRESSURE: 150 MMHG | BODY MASS INDEX: 28 KG/M2 | HEART RATE: 118 BPM

## 2022-12-14 DIAGNOSIS — R07.9 CHEST PAIN, UNSPECIFIED TYPE: ICD-10-CM

## 2022-12-14 DIAGNOSIS — I10 HYPERTENSION, ESSENTIAL: ICD-10-CM

## 2022-12-14 DIAGNOSIS — M54.6 ACUTE LEFT-SIDED THORACIC BACK PAIN: ICD-10-CM

## 2022-12-14 DIAGNOSIS — N70.93 TOA (TUBO-OVARIAN ABSCESS): ICD-10-CM

## 2022-12-14 DIAGNOSIS — I82.422 ACUTE DEEP VEIN THROMBOSIS (DVT) OF ILIAC VEIN OF LEFT LOWER EXTREMITY: Primary | ICD-10-CM

## 2022-12-14 DIAGNOSIS — R06.00 DYSPNEA, UNSPECIFIED TYPE: ICD-10-CM

## 2022-12-14 DIAGNOSIS — R06.02 SHORTNESS OF BREATH: Primary | ICD-10-CM

## 2022-12-14 LAB
ALBUMIN SERPL BCP-MCNC: 3.4 G/DL (ref 3.5–5.2)
ALP SERPL-CCNC: 66 U/L (ref 55–135)
ALT SERPL W/O P-5'-P-CCNC: 18 U/L (ref 10–44)
ANION GAP SERPL CALC-SCNC: 12 MMOL/L (ref 8–16)
APTT BLDCRRT: 30.4 SEC (ref 21–32)
AST SERPL-CCNC: 21 U/L (ref 10–40)
BASOPHILS # BLD AUTO: 0.09 K/UL (ref 0–0.2)
BASOPHILS NFR BLD: 1 % (ref 0–1.9)
BILIRUB SERPL-MCNC: 0.3 MG/DL (ref 0.1–1)
BILIRUB UR QL STRIP: NEGATIVE
BNP SERPL-MCNC: 14 PG/ML (ref 0–99)
BUN SERPL-MCNC: 9 MG/DL (ref 6–20)
CALCIUM SERPL-MCNC: 9.8 MG/DL (ref 8.7–10.5)
CHLORIDE SERPL-SCNC: 102 MMOL/L (ref 95–110)
CLARITY UR REFRACT.AUTO: CLEAR
CO2 SERPL-SCNC: 20 MMOL/L (ref 23–29)
COLOR UR AUTO: YELLOW
CREAT SERPL-MCNC: 0.6 MG/DL (ref 0.5–1.4)
DIFFERENTIAL METHOD: ABNORMAL
EOSINOPHIL # BLD AUTO: 0 K/UL (ref 0–0.5)
EOSINOPHIL NFR BLD: 0.1 % (ref 0–8)
ERYTHROCYTE [DISTWIDTH] IN BLOOD BY AUTOMATED COUNT: 17.3 % (ref 11.5–14.5)
EST. GFR  (NO RACE VARIABLE): >60 ML/MIN/1.73 M^2
GLUCOSE SERPL-MCNC: 87 MG/DL (ref 70–110)
GLUCOSE UR QL STRIP: NEGATIVE
HCT VFR BLD AUTO: 35.3 % (ref 37–48.5)
HGB BLD-MCNC: 10.8 G/DL (ref 12–16)
HGB UR QL STRIP: NEGATIVE
IMM GRANULOCYTES # BLD AUTO: 0.14 K/UL (ref 0–0.04)
IMM GRANULOCYTES NFR BLD AUTO: 1.6 % (ref 0–0.5)
INR PPP: 1 (ref 0.8–1.2)
KETONES UR QL STRIP: NEGATIVE
LEUKOCYTE ESTERASE UR QL STRIP: ABNORMAL
LYMPHOCYTES # BLD AUTO: 1.6 K/UL (ref 1–4.8)
LYMPHOCYTES NFR BLD: 18.3 % (ref 18–48)
MCH RBC QN AUTO: 24.2 PG (ref 27–31)
MCHC RBC AUTO-ENTMCNC: 30.6 G/DL (ref 32–36)
MCV RBC AUTO: 79 FL (ref 82–98)
MICROSCOPIC COMMENT: NORMAL
MONOCYTES # BLD AUTO: 0.7 K/UL (ref 0.3–1)
MONOCYTES NFR BLD: 8.2 % (ref 4–15)
NEUTROPHILS # BLD AUTO: 6.3 K/UL (ref 1.8–7.7)
NEUTROPHILS NFR BLD: 70.8 % (ref 38–73)
NITRITE UR QL STRIP: NEGATIVE
NRBC BLD-RTO: 0 /100 WBC
PH UR STRIP: 6 [PH] (ref 5–8)
PLATELET # BLD AUTO: 660 K/UL (ref 150–450)
PMV BLD AUTO: 9.9 FL (ref 9.2–12.9)
POTASSIUM SERPL-SCNC: 4 MMOL/L (ref 3.5–5.1)
PROT SERPL-MCNC: 7.5 G/DL (ref 6–8.4)
PROT UR QL STRIP: NEGATIVE
PROTHROMBIN TIME: 10.4 SEC (ref 9–12.5)
RBC # BLD AUTO: 4.47 M/UL (ref 4–5.4)
RBC #/AREA URNS AUTO: 0 /HPF (ref 0–4)
SODIUM SERPL-SCNC: 134 MMOL/L (ref 136–145)
SP GR UR STRIP: 1.01 (ref 1–1.03)
SQUAMOUS #/AREA URNS AUTO: 4 /HPF
TROPONIN I SERPL DL<=0.01 NG/ML-MCNC: <0.006 NG/ML (ref 0–0.03)
URN SPEC COLLECT METH UR: ABNORMAL
WBC # BLD AUTO: 8.83 K/UL (ref 3.9–12.7)
WBC #/AREA URNS AUTO: 4 /HPF (ref 0–5)

## 2022-12-14 PROCEDURE — 25000003 PHARM REV CODE 250: Performed by: PHYSICIAN ASSISTANT

## 2022-12-14 PROCEDURE — 99999 PR PBB SHADOW E&M-EST. PATIENT-LVL V: ICD-10-PCS | Mod: PBBFAC,,, | Performed by: FAMILY MEDICINE

## 2022-12-14 PROCEDURE — 99215 OFFICE O/P EST HI 40 MIN: CPT | Mod: PBBFAC,25 | Performed by: FAMILY MEDICINE

## 2022-12-14 PROCEDURE — 93010 ELECTROCARDIOGRAM REPORT: CPT | Mod: ,,, | Performed by: INTERNAL MEDICINE

## 2022-12-14 PROCEDURE — 99999 PR PBB SHADOW E&M-EST. PATIENT-LVL V: CPT | Mod: PBBFAC,,, | Performed by: FAMILY MEDICINE

## 2022-12-14 PROCEDURE — 93010 EKG 12-LEAD: ICD-10-PCS | Mod: ,,, | Performed by: INTERNAL MEDICINE

## 2022-12-14 PROCEDURE — 85610 PROTHROMBIN TIME: CPT | Performed by: PHYSICIAN ASSISTANT

## 2022-12-14 PROCEDURE — 99285 EMERGENCY DEPT VISIT HI MDM: CPT | Mod: 25,27

## 2022-12-14 PROCEDURE — 99204 PR OFFICE/OUTPT VISIT, NEW, LEVL IV, 45-59 MIN: ICD-10-PCS | Mod: S$PBB,,, | Performed by: FAMILY MEDICINE

## 2022-12-14 PROCEDURE — 85730 THROMBOPLASTIN TIME PARTIAL: CPT | Performed by: PHYSICIAN ASSISTANT

## 2022-12-14 PROCEDURE — 80053 COMPREHEN METABOLIC PANEL: CPT | Performed by: PHYSICIAN ASSISTANT

## 2022-12-14 PROCEDURE — 83880 ASSAY OF NATRIURETIC PEPTIDE: CPT | Performed by: PHYSICIAN ASSISTANT

## 2022-12-14 PROCEDURE — 99284 EMERGENCY DEPT VISIT MOD MDM: CPT | Mod: ,,, | Performed by: PHYSICIAN ASSISTANT

## 2022-12-14 PROCEDURE — 93005 ELECTROCARDIOGRAM TRACING: CPT

## 2022-12-14 PROCEDURE — 81001 URINALYSIS AUTO W/SCOPE: CPT | Performed by: PHYSICIAN ASSISTANT

## 2022-12-14 PROCEDURE — 84484 ASSAY OF TROPONIN QUANT: CPT | Performed by: PHYSICIAN ASSISTANT

## 2022-12-14 PROCEDURE — 99204 OFFICE O/P NEW MOD 45 MIN: CPT | Mod: S$PBB,,, | Performed by: FAMILY MEDICINE

## 2022-12-14 PROCEDURE — 99284 PR EMERGENCY DEPT VISIT,LEVEL IV: ICD-10-PCS | Mod: ,,, | Performed by: PHYSICIAN ASSISTANT

## 2022-12-14 PROCEDURE — 85025 COMPLETE CBC W/AUTO DIFF WBC: CPT | Performed by: PHYSICIAN ASSISTANT

## 2022-12-14 PROCEDURE — 25500020 PHARM REV CODE 255: Performed by: EMERGENCY MEDICINE

## 2022-12-14 RX ORDER — ACETAMINOPHEN 500 MG
1000 TABLET ORAL
Status: COMPLETED | OUTPATIENT
Start: 2022-12-14 | End: 2022-12-14

## 2022-12-14 RX ADMIN — IOHEXOL 75 ML: 350 INJECTION, SOLUTION INTRAVENOUS at 12:12

## 2022-12-14 RX ADMIN — ACETAMINOPHEN 1000 MG: 500 TABLET ORAL at 10:12

## 2022-12-14 NOTE — Clinical Note
Ellie - patient presented to me as a new patient today and had an appointment as a new patient with you this afternoon.  I sent her to the emergency room for evaluation of a possible PE.  She had a TOA on recent hospitalization with DVT, but chest symptoms developed after discharge while on anticoagulation.  She will still need a follow-up for the gynecologic problems sometime soon.  Thank you, I wanted to make you aware of why she may miss her appointment

## 2022-12-14 NOTE — ED NOTES
Patient states left lung pain with cough onset Friday, reports known blood bimal, currently on Lovenox BID.

## 2022-12-14 NOTE — PROGRESS NOTES
Subjective:       Patient ID: Amy Womack is a 42 y.o. female.    Chief Complaint: Follow-up    New patient, same day urgent care presents - follow-up from hospital, DVT and TOA.  As appointment with OBGYN this afternoon.  She reports left-sided flank and lower chest wall pain, pleuritic in nature with deep inspiration or motion.  No syncope, near syncope or palpitations.  She was in the hospital for DVT and antibiotic treatment for TOA.  That route.  Initial presentation to [place] and then was transferred.  States main campus [facility] on bed diversion at the time.  Discharged on Lovenox shots.  The abdominal and chest wall discomfort came about on or after her previous discharge and states that the abdominal pain initially present has improved somewhat but not completely gone away.  Reports no fever or definite dyspnea.    Concerning hypertension she was on medications in the past, may have experienced some weight gain over time. ?  Hydrochlorothiazide.  No records available.    Patient presents for a post hospitalization follow up visit. Current complaints addressed in the ROS section. Reviewed the pertinent chart data including (but not limited to) labs, imaging, cardiovascular, procedures and available ER/DC Summary and inpatient provider notes. Problem list items reviewed and modified or added entries (in the overview section) may not be transcribed into this encounter note due to note writer format.    Copy D/C Summary:      Admission Date: 2022  Hospital Length of Stay: 3 days  Discharge Date and Time:  2022 8:50 AM  Attending Physician: Dana Ledesma MD   Discharging Provider: Susana Chino MD  Primary Care Provider: Primary Doctor No     HPI:  41 y/o  presents to our hospital as a transfer from [facility].  Pt presented there on 22.  Symptoms started on  with nausea/vomiting and loose stools.  Was around some friends whose children had  similar symptoms.  Over the next few days, her abdomen became progressively more distended, then started having pelvic pain (LLQ>RLQ) and fevers and chills.  Wednesday morning, she developed significant left groin and left upper thigh burning pain, so presented to the ED.  Work-up in the ED revealed a leukocytosis (WBC's 26.2), mild hypokalemia, and CT Abd/pelvis with multiple findings, but most significantly:  1. bilateral TOA's (3.6 x 7.2 cm on the right, and 6.6 x 7.5 cm on the left)  2. IUD low in the uterus  3. left hydrouretronephrosis likely due to mass effect from abscesses  4. Questionable LUQ intussusception with either SBO vs developing ileus.  5. Potential thrombus in left common and external iliac veins and left common femoral vein     Pelvic ultrasound: bilateral TOA's; IUD located within the cervix  LE Dopplers: DVT in left external iliac vein     Gyn hx significant for Mirena IUD in place for 6 years.  Achieved amenorrhea with it until 1.5 years ago, then has had regular, monthly periods since then.  Started having some light bleeding today.  Pt was sexually active with her boyfriend of 4 years, but they broke up a few months ago.  Not sexually active since then.  Does not suspect any infidelity prior to their break-up.  GC/CT from [place] pending.  12/4/22--wbc decreased to 20K; pain improved, abx, replace potassium        Hospital Course:  12/2/22: Transferred overnight from [place] for TOA. Continue IV abx. CT AP with contrast results pending. WBC 26.5. Patient states that she feels better than she did earlier this week. IUD removed overnight by Dr. Ledesma.   12/2/22--bilateral toa persist on ct scan, with Interval slight reduction in size of bilateral complex multiloculated tubo-ovarian abscesses, on the right measuring 6.8 x 3.9 cm previously 7.5 x 3.9 cm, and on the left today 5.5 x 4.5 cm, previously 7 x 6 cm      12/5/22- patient reports her pain has significantly improved.  Tolerating oral intake. Meeting discharge criteria       Review of Systems   Constitutional:  Positive for fatigue. Negative for appetite change, chills, diaphoresis and fever.   HENT:  Negative for congestion, postnasal drip, rhinorrhea, sore throat and trouble swallowing.    Eyes:  Negative for visual disturbance.   Respiratory:  Positive for shortness of breath. Negative for cough, choking, chest tightness and wheezing.    Cardiovascular:  Positive for chest pain. Negative for leg swelling.   Gastrointestinal:  Positive for abdominal pain. Negative for abdominal distention, diarrhea, nausea and vomiting.   Genitourinary:  Positive for pelvic pain. Negative for difficulty urinating and hematuria.   Musculoskeletal:  Negative for arthralgias and myalgias.   Skin:  Negative for rash.   Neurological:  Negative for weakness, light-headedness and headaches.   Hematological:  Does not bruise/bleed easily.   Psychiatric/Behavioral:  Negative for decreased concentration and dysphoric mood.      Objective:      Physical Exam  Vitals and nursing note reviewed.   Constitutional:       Appearance: She is well-developed. She is not diaphoretic.   HENT:      Head: Normocephalic and atraumatic.   Eyes:      General: No scleral icterus.     Conjunctiva/sclera: Conjunctivae normal.   Cardiovascular:      Rate and Rhythm: Normal rate.      Heart sounds: Normal heart sounds. No murmur heard.    No friction rub. No gallop.   Pulmonary:      Effort: Pulmonary effort is normal. No respiratory distress.      Breath sounds: Normal breath sounds. No wheezing or rales.   Abdominal:      General: There is no distension or abdominal bruit.      Tenderness: There is abdominal tenderness. There is no right CVA tenderness, left CVA tenderness, guarding or rebound.   Musculoskeletal:         General: No deformity.      Cervical back: Normal range of motion and neck supple.   Skin:     General: Skin is warm and dry.      Findings: No erythema or  rash.   Neurological:      Mental Status: She is alert and oriented to person, place, and time.      Cranial Nerves: No cranial nerve deficit.      Motor: No tremor.      Coordination: Coordination normal.      Gait: Gait normal.   Psychiatric:         Behavior: Behavior normal.         Thought Content: Thought content normal.         Judgment: Judgment normal.       Assessment:       1. Acute deep vein thrombosis (DVT) of iliac vein of left lower extremity    2. Chest pain, unspecified type    3. Hypertension, essential    4. TOA (tubo-ovarian abscess)    5. Dyspnea, unspecified type          Plan:     Medication List with Changes/Refills   Current Medications    CALCIUM CARBONATE (CALCIUM 600 ORAL)        DOXYCYCLINE (VIBRAMYCIN) 100 MG CAP    Take 1 capsule (100 mg total) by mouth 2 (two) times daily. for 14 days    DULOXETINE HCL (CYMBALTA ORAL)    Take by mouth.    ENOXAPARIN (LOVENOX) 80 MG/0.8 ML SYRG    Inject 0.7 mLs (70 mg total) into the skin every 12 (twelve) hours.    FERROUS FUMARATE/VIT BCOMP,C (SUPER B COMPLEX ORAL)        METRONIDAZOLE (FLAGYL) 500 MG TABLET    Take 1 tablet (500 mg total) by mouth every 12 (twelve) hours. for 14 days    SEMAGLUTIDE (OZEMPIC SUBQ)    Inject into the skin.     1. Acute deep vein thrombosis (DVT) of iliac vein of left lower extremity  -     Ambulatory referral/consult to Vascular Cardiology; Future; Expected date: 12/15/2022  -     Ambulatory referral/consult to Hematology / Oncology; Future; Expected date: 12/15/2022    2. Chest pain, unspecified type  -     CTA Chest Non-Coronary (PE Studies); Future; Expected date: 12/14/2022  -     EKG 12-lead; Future  -     Urinalysis; Future; Expected date: 12/14/2022  -     Urinalysis Microscopic; Future; Expected date: 12/14/2022  -     Urine culture; Future; Expected date: 12/14/2022    3. Hypertension, essential  -     Comprehensive Metabolic Panel; Future; Expected date: 12/14/2022  -     TSH; Future; Expected date:  12/14/2022    4. TOA (tubo-ovarian abscess)  -     CBC Auto Differential; Future; Expected date: 12/14/2022  -     Comprehensive Metabolic Panel; Future; Expected date: 12/14/2022    5. Dyspnea, unspecified type  -     EKG 12-lead; Future      See meds, orders, follow up, routing and instructions sections of encounter and AVS. Discussed with patient and provided on AVS.    Lab Results   Component Value Date     12/05/2022    K 3.5 12/05/2022     12/05/2022    BUN 3 (L) 12/05/2022    CREATININE 0.5 12/05/2022    GLU 86 12/05/2022    MG 1.6 12/04/2022    AST 11 12/02/2022    ALT 11 12/02/2022    ALBUMIN 1.7 (L) 12/02/2022    PROT 5.5 (L) 12/02/2022    BILITOT 0.4 12/02/2022    WBC 8.83 12/14/2022    HGB 10.8 (L) 12/14/2022    HCT 35.3 (L) 12/14/2022     (H) 12/14/2022    BNP 14 12/14/2022         My concern today is development of pleuritic left-sided chest pain subsequent to a hospitalization for DVT.  Feel she needs immediate rule out for pulmonary embolism which may include echo cardiogram evaluation for strain.  Her oxygenation on room air is normal in the clinic today but she did have a pulse of 118.  Hypertensive.  Checkout with emergency room staff, patient transported by security van.  Recommend she follow-up with us after evaluation, discharge.  Workup orders placed at our visit today but will defer until after ER for admission determination.  Would need to see hematologist and vascular cardiologist concerning anticoagulation recommendations for longer-term.    Let her gynecologist no that she may not be able to make appointment later today.

## 2022-12-14 NOTE — TELEPHONE ENCOUNTER
Call pt no answer no voicemail set up.. Call pt friend to follow up her appoinment. Dec 19 at 9:45 am with

## 2022-12-14 NOTE — TELEPHONE ENCOUNTER
----- Message from LOGAN Gandara sent at 12/14/2022  1:12 PM CST -----  Hi, please call and schedule with MD provider for tubo ovarian abscess follow up. She missed appointment today was send to ED by her PCP pulmonary embolism work up. Maybe we can try and contact her sometime tomorrow or later this week to get her scheduled. Thanks.   ----- Message -----  From: Lb Coleman MD  Sent: 12/14/2022  12:45 PM CST  To: LOGAN Gandara    Ellie - patient presented to me as a new patient today and had an appointment as a new patient with you this afternoon.  I sent her to the emergency room for evaluation of a possible PE.  She had a TOA on recent hospitalization with DVT, but chest symptoms developed after discharge while on anticoagulation.  She will still need a follow-up for the gynecologic problems sometime soon.  Thank you, I wanted to make you aware of why she may miss her appointment

## 2022-12-14 NOTE — ED NOTES
Patient identifiers verified and correct for MS Womack  C/C: SOB SEE NN  APPEARANCE: awake and alert in NAD. PAIN  3/10  SKIN: warm, dry and intact. No breakdown or bruising.  MUSCULOSKELETAL: Patient moving all extremities spontaneously, no obvious swelling or deformities noted. Ambulates independently.  RESPIRATORY: Positive  shortness of breath.Respirations unlabored.  Reports cough, deneis fevers  CARDIAC:Positive left CP, worse with mvmt, 2+ distal pulses; no peripheral edema  ABDOMEN: S/ND/NT, Denies nausea  : voids spontaneously, denies difficulty  Neurologic: AAO x 4; follows commands equal strength in all extremities; denies numbness/tingling. Denies dizziness  Denies weakness

## 2022-12-14 NOTE — ED PROVIDER NOTES
"Encounter Date: 12/14/2022       History     Chief Complaint   Patient presents with    Shortness of Breath     Sent from clinic to rule out PE     42-year-old female with recent admission for bilateral TOA and left internal iliac DVT, currently on Flagyl, doxycycline, Lovenox b.i.d. presents to the ED complaining of shortness of breath.  Friday, she developed left flank pain/left thoracic back pain that has been progressively worsening since onset.  Pain is now a constant 3/10 "dull" worse with coughing, sneezing, movement, deep breaths.  She has been having significant dyspnea on exertion.  Her abdominal pain from TOA has been improving with antibiotics.  She reports a dry cough, nausea without vomiting, lightheadedness.  She was seen in the clinic today for post hospital discharge follow-up, sent to the ED to rule out PE.  She denies fever, chills, diarrhea, dysuria, headache, LE edema, numbness.     The history is provided by the patient.   Review of patient's allergies indicates:   Allergen Reactions    Macadamia nut oil Hives, Itching, Palpitations and Swelling     Past Medical History:   Diagnosis Date    Hypertension      Past Surgical History:   Procedure Laterality Date    AUGMENTATION OF BREAST Bilateral     LIPOSUCTION       History reviewed. No pertinent family history.  Social History     Tobacco Use    Smoking status: Never    Smokeless tobacco: Never   Substance Use Topics    Alcohol use: Not Currently    Drug use: Never     Review of Systems   Constitutional:  Negative for chills and fever.   HENT:  Negative for congestion and sore throat.    Respiratory:  Positive for cough and shortness of breath.    Cardiovascular:  Positive for chest pain.   Gastrointestinal:  Positive for abdominal pain and nausea. Negative for constipation, diarrhea and vomiting.   Genitourinary:  Negative for dysuria and hematuria.   Musculoskeletal:  Negative for back pain.   Skin:  Negative for rash.   Neurological:  " Positive for light-headedness. Negative for weakness and headaches.   Psychiatric/Behavioral:  Negative for confusion.      Physical Exam     Initial Vitals [12/14/22 1010]   BP Pulse Resp Temp SpO2   (!) 177/99 98 18 98.1 °F (36.7 °C) 99 %      MAP       --         Physical Exam    Nursing note and vitals reviewed.  Constitutional: She appears well-developed and well-nourished. She is not diaphoretic. No distress.   HENT:   Head: Normocephalic and atraumatic.   Neck: Neck supple.   Normal range of motion.  Cardiovascular:  Normal rate, regular rhythm and normal heart sounds.     Exam reveals no gallop and no friction rub.       No murmur heard.  Pulmonary/Chest: Breath sounds normal. She has no wheezes. She has no rhonchi. She has no rales. She exhibits no tenderness.   Pain with deep breaths   Abdominal: Abdomen is soft. Bowel sounds are normal. There is no abdominal tenderness. There is no rebound and no guarding.   Musculoskeletal:         General: Normal range of motion.      Cervical back: Normal range of motion and neck supple.      Comments: +tenderness to the L thoracic back. No LE edema     Neurological: She is alert and oriented to person, place, and time.   Skin: Skin is warm and dry. No rash noted. No erythema.   Psychiatric: She has a normal mood and affect.       ED Course   Procedures  Labs Reviewed   CBC W/ AUTO DIFFERENTIAL - Abnormal; Notable for the following components:       Result Value    Hemoglobin 10.8 (*)     Hematocrit 35.3 (*)     MCV 79 (*)     MCH 24.2 (*)     MCHC 30.6 (*)     RDW 17.3 (*)     Platelets 660 (*)     Immature Granulocytes 1.6 (*)     Immature Grans (Abs) 0.14 (*)     All other components within normal limits   COMPREHENSIVE METABOLIC PANEL - Abnormal; Notable for the following components:    Sodium 134 (*)     CO2 20 (*)     Albumin 3.4 (*)     All other components within normal limits   URINALYSIS, REFLEX TO URINE CULTURE - Abnormal; Notable for the following  components:    Leukocytes, UA Trace (*)     All other components within normal limits    Narrative:     Specimen Source->Urine   TROPONIN I   B-TYPE NATRIURETIC PEPTIDE   PROTIME-INR   APTT   URINALYSIS MICROSCOPIC    Narrative:     Specimen Source->Urine        ECG Results              EKG 12-lead (Final result)  Result time 12/14/22 13:39:07      Final result by Interface, Lab In [facility] (12/14/22 13:39:07)                   Narrative:    Test Reason : R06.02,    Vent. Rate : 104 BPM     Atrial Rate : 104 BPM     P-R Int : 148 ms          QRS Dur : 072 ms      QT Int : 342 ms       P-R-T Axes : 051 007 078 degrees     QTc Int : 449 ms    Sinus tachycardia  Anteroseptal infarct ,age undetermined  Anterolateral Infarct - Age inderterminate, or probably old  Nonspecific ST and/or T wave abnormalities (<1mm ST elevation in inferior  leads)  Abnormal ECG  No previous ECGs available  Confirmed by Freddy Chun MD ([id number]) on 12/14/2022 1:38:58 PM    Referred By:             Confirmed By:Freddy Chun MD                                  Imaging Results              CTA Chest Non-Coronary (PE Studies) (Final result)  Result time 12/14/22 13:14:36      Final result by Suleman Rogel MD (12/14/22 13:14:36)                   Impression:      1. Examination considered diagnostic to the proximal segmental pulmonary arterial level without convincing evidence of acute pulmonary embolism.  2. No definite acute intrathoracic findings.  3. Additional details, as provided in the body of the report.      Electronically signed by: Suleman Rogel  Date:    12/14/2022  Time:    13:14               Narrative:    EXAMINATION:  CTA CHEST NON CORONARY (PE STUDIES)    CLINICAL HISTORY:  Pulmonary embolism (PE) suspected, high prob;    TECHNIQUE:  Low dose axial images, sagittal and coronal reformations were obtained from the thoracic inlet to the lung bases following the IV administration of 75 mL of Omnipaque 350.  Contrast timing was  optimized to evaluate the pulmonary arteries.  MIP images were performed.    COMPARISON:  None    FINDINGS:  Exam quality: Examination considered diagnostic to the proximal segmental pulmonary arterial level.    Pulmonary embolism: No acute or chronic pulmonary embolism.    Central pulmonary arteries: Normal caliber.    Aorta: Normal caliber.    Heart: No right heart strain. Normal size.    Coronary arteries: No calcifications.    Pericardium: Normal. No effusion, thickening, or calcification.    Support tubes and lines: None.    Base of neck/thyroid: Normal.    Lymph nodes: No supraclavicular, axillary, internal mammary, mediastinal, or hilar adenopathy.    Esophagus: Normal.    Pleura: No effusion, thickening, or calcification.    Upper abdomen: Unremarkable    Body wall: Bilateral breast implants.    Airways: Central airways appear patent.    Lungs: Assessment of the lungs limited due to respiratory motion.  Dependent atelectasis is suggested.    Bones: No focal lesion.                                       Medications   acetaminophen tablet 1,000 mg (1,000 mg Oral Given 12/14/22 1043)   iohexoL (OMNIPAQUE 350) injection 75 mL (75 mLs Intravenous Given 12/14/22 1226)     Medical Decision Making:   History:   Old Medical Records: I decided to obtain old medical records.  Old Records Summarized: records from clinic visits and records from previous admission(s).       <> Summary of Records: Admitted 12/2-12/5 for bilateral TOA, IUD removed while admitted, started on IV abx. Also noted to have L internal iliac DVT. Discharged on flagyl, doxycycline, lovenox  Clinical Tests:   Lab Tests: Ordered and Reviewed  Radiological Study: Ordered and Reviewed  Medical Tests: Reviewed and Ordered     APC / Resident Notes:   42-year-old female with recent admission for bilateral TOA and left internal iliac DVT, currently on Flagyl, doxycycline, Lovenox b.i.d. presents to the ED complaining of shortness of breath.  Hypertensive.   Regular rate and rhythm.  Lungs are clear.  Left thoracic back tenderness.  No rashes.  Abdomen is soft and nontender.  No significant lower extremity edema.  Hospital records reviewed from recent admission.  She is on Lovenox.  Differential diagnosis includes but is not limited to pneumonia, PE, musculoskeletal pain, dehydration, anemia, acute kidney injury.  Will get labs, CTA.    UA with no hematuria or infection.  No leukocytosis.  CMP unremarkable.  Troponin normal.  BNP normal.    CTA chest with no evidence of PE.  No acute abnormalities noted.    Pain improved with Tylenol in the ED.  Unclear etiology of pain - ?Musculoskeletal vs shingles prodrome.    I do not feel that she needs any further labs or imaging at this time. Stable for discharge.     She was discharged without any new prescriptions - tylenol as needed for pain.  She will follow up with her PCP and OBGYN.  Strict ED return precautions given.  All of the patient's questions were answered.  I reviewed the patient's chart, labs, and imaging and discussed the case with my supervising physician.                     Clinical Impression:   Final diagnoses:  [R06.02] Shortness of breath (Primary)  [M54.6] Acute left-sided thoracic back pain        ED Disposition Condition    Discharge Stable          ED Prescriptions    None       Follow-up Information       Follow up With Specialties Details Why Contact Info Additional Information    [facility]   [address]  [facility] [id number] [phone] [facility] Please park in surface lot and check in at central registration desk             Yaneth Caro PA-C  12/14/22 [id number]

## 2022-12-15 ENCOUNTER — TELEPHONE (OUTPATIENT)
Dept: INTERNAL MEDICINE | Facility: CLINIC | Age: 42
End: 2022-12-15
Payer: OTHER GOVERNMENT

## 2022-12-15 DIAGNOSIS — I82.422 ACUTE DEEP VEIN THROMBOSIS (DVT) OF ILIAC VEIN OF LEFT LOWER EXTREMITY: Primary | ICD-10-CM

## 2022-12-15 NOTE — TELEPHONE ENCOUNTER
Called and spoke to the pt. She is confirmed for Cardiology and Hematology tomorrow. Pt is also scheduled for [facility] f/u [facility] Dr. Coleman

## 2022-12-15 NOTE — TELEPHONE ENCOUNTER
Patient was seen yesterday but I sent her to the emergency room to rule out a PE, which is apparently ruled out.    I placed referrals for Hematology-Oncology and vascular Cardiology.    Please call and confirm those appointments on Thursday with her, thank you     Also, please schedule follow-up with me in about 3 months to go over her health maintenance and routine care, thank you

## 2022-12-16 ENCOUNTER — OFFICE VISIT (OUTPATIENT)
Dept: CARDIOLOGY | Facility: CLINIC | Age: 42
End: 2022-12-16
Attending: FAMILY MEDICINE
Payer: OTHER GOVERNMENT

## 2022-12-16 ENCOUNTER — OFFICE VISIT (OUTPATIENT)
Dept: HEMATOLOGY/ONCOLOGY | Facility: CLINIC | Age: 42
End: 2022-12-16
Attending: FAMILY MEDICINE
Payer: OTHER GOVERNMENT

## 2022-12-16 VITALS
BODY MASS INDEX: 28.4 KG/M2 | OXYGEN SATURATION: 97 % | HEART RATE: 94 BPM | DIASTOLIC BLOOD PRESSURE: 82 MMHG | WEIGHT: 140.88 LBS | SYSTOLIC BLOOD PRESSURE: 159 MMHG | HEIGHT: 59 IN

## 2022-12-16 VITALS
WEIGHT: 140.56 LBS | HEART RATE: 94 BPM | OXYGEN SATURATION: 98 % | HEIGHT: 59 IN | RESPIRATION RATE: 17 BRPM | TEMPERATURE: 98 F | DIASTOLIC BLOOD PRESSURE: 82 MMHG | BODY MASS INDEX: 28.34 KG/M2 | SYSTOLIC BLOOD PRESSURE: 165 MMHG

## 2022-12-16 DIAGNOSIS — T83.32XD INTRAUTERINE DEVICE (IUD) MIGRATION, SUBSEQUENT ENCOUNTER: ICD-10-CM

## 2022-12-16 DIAGNOSIS — N70.93 TOA (TUBO-OVARIAN ABSCESS): ICD-10-CM

## 2022-12-16 DIAGNOSIS — I82.422 ACUTE DEEP VEIN THROMBOSIS (DVT) OF ILIAC VEIN OF LEFT LOWER EXTREMITY: Primary | ICD-10-CM

## 2022-12-16 PROCEDURE — 99204 PR OFFICE/OUTPT VISIT, NEW, LEVL IV, 45-59 MIN: ICD-10-PCS | Mod: S$PBB,,, | Performed by: INTERNAL MEDICINE

## 2022-12-16 PROCEDURE — 99214 OFFICE O/P EST MOD 30 MIN: CPT | Mod: PBBFAC,27 | Performed by: INTERNAL MEDICINE

## 2022-12-16 PROCEDURE — 99999 PR PBB SHADOW E&M-EST. PATIENT-LVL V: ICD-10-PCS | Mod: PBBFAC,,, | Performed by: INTERNAL MEDICINE

## 2022-12-16 PROCEDURE — 99205 PR OFFICE/OUTPT VISIT, NEW, LEVL V, 60-74 MIN: ICD-10-PCS | Mod: S$PBB,,, | Performed by: INTERNAL MEDICINE

## 2022-12-16 PROCEDURE — 99204 OFFICE O/P NEW MOD 45 MIN: CPT | Mod: S$PBB,,, | Performed by: INTERNAL MEDICINE

## 2022-12-16 PROCEDURE — 99999 PR PBB SHADOW E&M-EST. PATIENT-LVL V: CPT | Mod: PBBFAC,,, | Performed by: INTERNAL MEDICINE

## 2022-12-16 PROCEDURE — 99215 OFFICE O/P EST HI 40 MIN: CPT | Mod: PBBFAC | Performed by: INTERNAL MEDICINE

## 2022-12-16 PROCEDURE — 99999 PR PBB SHADOW E&M-EST. PATIENT-LVL IV: CPT | Mod: PBBFAC,,, | Performed by: INTERNAL MEDICINE

## 2022-12-16 PROCEDURE — 99205 OFFICE O/P NEW HI 60 MIN: CPT | Mod: S$PBB,,, | Performed by: INTERNAL MEDICINE

## 2022-12-16 PROCEDURE — 99999 PR PBB SHADOW E&M-EST. PATIENT-LVL IV: ICD-10-PCS | Mod: PBBFAC,,, | Performed by: INTERNAL MEDICINE

## 2022-12-16 NOTE — PROGRESS NOTES
HEMATOLOGIC MALIGNANCIES CONSULT NOTE    IDENTIFYING STATEMENT   Amy Gonzáles) is a 42 y.o. female with a  of 1980 from [place], referred by Dr. Coleman for evaluation of DVT.     HISTORY OF PRESENT ILLNESS:      Ms. Womack is 42 and presents to establish care for recently diagnosed DVT of the L external iliac vein.     She presented to the emergency department on 2022 with nausea/vomiting and diarrhea, which eventually progressed to pelvic pain. CT abdomen/pelvis demonstrated bilateral tubo-ovarian abscesses for which she was admitted. She had possible L external iliac vein thrombus on CT, and follow-up lower extremity ultrasound confirmed this.    She was placed on IV antibiotics and hospitalized from 2022 - 2022. She remains on oral antibiotics. She has upcoming follow-up with GYN.    For anticoagulation, she was started on subcutaneous enoxaparin. Per patient, she was not offered oral anticoagulant therapy.    She saw Dr. Schneider in vascular medicine to establish care as well.    Past Medical History:   Diagnosis Date    Hypertension        Family History   Problem Relation Age of Onset    Hypertension Mother     Stroke Father     Atrial fibrillation Father     Hypertension Father     Hypertension Maternal Grandmother     Stroke Maternal Grandfather     Hypertension Maternal Grandfather     Heart attack Paternal Grandmother     Hypertension Paternal Grandmother     Atrial fibrillation Paternal Grandmother     Heart attack Paternal Grandfather        Social History     Socioeconomic History    Marital status: Single   Tobacco Use    Smoking status: Never    Smokeless tobacco: Never   Substance and Sexual Activity    Alcohol use: Not Currently    Drug use: Never    Sexual activity: Not Currently     Partners: Male     Birth control/protection: I.U.D.     Social Determinants of Health     Financial Resource Strain: Low Risk     Difficulty of Paying Living Expenses: Not very hard    Food Insecurity: No Food Insecurity    Worried About Running Out of Food in the Last Year: Never true    Ran Out of Food in the Last Year: Never true   Transportation Needs: No Transportation Needs    Lack of Transportation (Medical): No    Lack of Transportation (Non-Medical): No   Physical Activity: Sufficiently Active    Days of Exercise per Week: 5 days    Minutes of Exercise per Session: 60 min   Stress: No Stress Concern Present    Feeling of Stress : Only a little   Social Connections: Moderately Isolated    Frequency of Communication with Friends and Family: More than three times a week    Frequency of Social Gatherings with Friends and Family: More than three times a week    Attends Congregational Services: More than 4 times per year    Active Member of Clubs or Organizations: No    Attends Club or Organization Meetings: Never    Marital Status:    Housing Stability: Low Risk     Unable to Pay for Housing in the Last Year: No    Number of Places Lived in the Last Year: 2    Unstable Housing in the Last Year: No         MEDICATIONS:     Current Outpatient Medications on File Prior to Visit   Medication Sig Dispense Refill    calcium carbonate (CALCIUM 600 ORAL)       doxycycline (VIBRAMYCIN) 100 MG Cap Take 1 capsule (100 mg total) by mouth 2 (two) times daily. for 14 days 28 capsule 0    duloxetine HCl (CYMBALTA ORAL) Take by mouth.      ferrous fumarate/vit Bcomp,C (SUPER B COMPLEX ORAL)       metroNIDAZOLE (FLAGYL) 500 MG tablet Take 1 tablet (500 mg total) by mouth every 12 (twelve) hours. for 14 days 14 tablet 0    semaglutide (OZEMPIC SUBQ) Inject into the skin.       No current facility-administered medications on file prior to visit.       ALLERGIES:   Review of patient's allergies indicates:   Allergen Reactions    Macadamia nut oil Hives, Itching, Palpitations and Swelling        ROS:       Review of Systems   Constitutional:  Negative for appetite change and unexpected weight change.   HENT:   " Negative for mouth sores.    Respiratory:  Positive for cough and shortness of breath.    Cardiovascular:  Negative for chest pain.   Gastrointestinal:  Positive for abdominal pain. Negative for diarrhea.   Genitourinary:  Negative for frequency.    Musculoskeletal:  Positive for back pain.   Skin:  Negative for rash.   Neurological:  Positive for headaches.   Hematological:  Negative for adenopathy.   Psychiatric/Behavioral:  The patient is not nervous/anxious.      PHYSICAL EXAM:  Vitals:    12/16/22 1430   BP: (!) 165/82   Pulse: 94   Resp: 17   Temp: 97.8 °F (36.6 °C)   SpO2: 98%   Weight: 63.7 kg (140 lb 8.7 oz)   Height: 4' 11" (1.499 m)   PainSc:   2   PainLoc: Back       Physical Exam  Constitutional:       General: She is not in acute distress.     Appearance: She is well-developed.   HENT:      Head: Normocephalic and atraumatic.      Mouth/Throat:      Mouth: No oral lesions.   Eyes:      Conjunctiva/sclera: Conjunctivae normal.   Neck:      Thyroid: No thyromegaly.   Cardiovascular:      Rate and Rhythm: Normal rate and regular rhythm.      Heart sounds: Normal heart sounds. No murmur heard.  Pulmonary:      Breath sounds: Normal breath sounds. No wheezing or rales.   Abdominal:      General: There is no distension.      Palpations: Abdomen is soft. There is no hepatomegaly, splenomegaly or mass.      Tenderness: There is no abdominal tenderness.   Lymphadenopathy:      Cervical: No cervical adenopathy.      Right cervical: No deep cervical adenopathy.     Left cervical: No deep cervical adenopathy.   Skin:     Findings: No rash.   Neurological:      Mental Status: She is alert and oriented to person, place, and time.      Cranial Nerves: No cranial nerve deficit.      Coordination: Coordination normal.      Deep Tendon Reflexes: Reflexes are normal and symmetric.       LAB:   Results for orders placed or performed during the hospital encounter of 12/14/22   CBC Auto Differential   Result Value Ref " Range    WBC 8.83 3.90 - 12.70 K/uL    RBC 4.47 4.00 - 5.40 M/uL    Hemoglobin 10.8 (L) 12.0 - 16.0 g/dL    Hematocrit 35.3 (L) 37.0 - 48.5 %    MCV 79 (L) 82 - 98 fL    MCH 24.2 (L) 27.0 - 31.0 pg    MCHC 30.6 (L) 32.0 - 36.0 g/dL    RDW 17.3 (H) 11.5 - 14.5 %    Platelets 660 (H) 150 - 450 K/uL    MPV 9.9 9.2 - 12.9 fL    Immature Granulocytes 1.6 (H) 0.0 - 0.5 %    Gran # (ANC) 6.3 1.8 - 7.7 K/uL    Immature Grans (Abs) 0.14 (H) 0.00 - 0.04 K/uL    Lymph # 1.6 1.0 - 4.8 K/uL    Mono # 0.7 0.3 - 1.0 K/uL    Eos # 0.0 0.0 - 0.5 K/uL    Baso # 0.09 0.00 - 0.20 K/uL    nRBC 0 0 /100 WBC    Gran % 70.8 38.0 - 73.0 %    Lymph % 18.3 18.0 - 48.0 %    Mono % 8.2 4.0 - 15.0 %    Eosinophil % 0.1 0.0 - 8.0 %    Basophil % 1.0 0.0 - 1.9 %    Differential Method Automated    Comprehensive Metabolic Panel   Result Value Ref Range    Sodium 134 (L) 136 - 145 mmol/L    Potassium 4.0 3.5 - 5.1 mmol/L    Chloride 102 95 - 110 mmol/L    CO2 20 (L) 23 - 29 mmol/L    Glucose 87 70 - 110 mg/dL    BUN 9 6 - 20 mg/dL    Creatinine 0.6 0.5 - 1.4 mg/dL    Calcium 9.8 8.7 - 10.5 mg/dL    Total Protein 7.5 6.0 - 8.4 g/dL    Albumin 3.4 (L) 3.5 - 5.2 g/dL    Total Bilirubin 0.3 0.1 - 1.0 mg/dL    Alkaline Phosphatase 66 55 - 135 U/L    AST 21 10 - 40 U/L    ALT 18 10 - 44 U/L    Anion Gap 12 8 - 16 mmol/L    eGFR >60.0 >60 mL/min/1.73 m^2   Troponin I   Result Value Ref Range    Troponin I <0.006 0.000 - 0.026 ng/mL   Brain natriuretic peptide   Result Value Ref Range    BNP 14 0 - 99 pg/mL   Protime-INR   Result Value Ref Range    Prothrombin Time 10.4 9.0 - 12.5 sec    INR 1.0 0.8 - 1.2   APTT   Result Value Ref Range    aPTT 30.4 21.0 - 32.0 sec   Urinalysis, Reflex to Urine Culture Urine, Clean Catch    Specimen: Urine   Result Value Ref Range    Specimen UA Urine, Clean Catch     Color, UA Yellow Yellow, Straw, Caitlin    Appearance, UA Clear Clear    pH, UA 6.0 5.0 - 8.0    Specific Gravity, UA 1.010 1.005 - 1.030    Protein, UA  Negative Negative    Glucose, UA Negative Negative    Ketones, UA Negative Negative    Bilirubin (UA) Negative Negative    Occult Blood UA Negative Negative    Nitrite, UA Negative Negative    Leukocytes, UA Trace (A) Negative   Urinalysis Microscopic   Result Value Ref Range    RBC, UA 0 0 - 4 /hpf    WBC, UA 4 0 - 5 /hpf    Squam Epithel, UA 4 /hpf    Microscopic Comment SEE COMMENT        PROBLEMS ASSESSED THIS VISIT:    1. Acute deep vein thrombosis (DVT) of iliac vein of left lower extremity    2. TOA (tubo-ovarian abscess)        IMPRESSION:    1. DVT of the external iliac vein in the setting of tubo-ovarian abscess development    2. Bilateral tubo-ovarian abscesses    PLAN:       Deep vein thrombosis of the left external iliac vein  Ms. Womack developed a DVT of the iliac vein in the setting of tubo-ovarian abscess. Given that this was a severe pelvic infection that caused sepsis, I consider this DVT to be provoked.    We discussed that for provoked DVT, she should have an appropriate period of primary anticoagulation (3-6 months). At the end of this period, if the risk factor for thrombosis (tubo-ovarian abscess) is resolved, she may discontinue anticoagulation.    She has been anticoagulated with enoxaparin SQ. She states she was not offered any form of oral anticoagulation. She would prefer an oral anticoagulant. I have written a prescription for apixaban 5 mg PO BID.     Dr. Schneider has ordered follow-up ultrasound after the 3 month period. I will review this and see her back to consider end-of-therapy following that date.    Follow-up   After repeat ultrasound (3/27/2023)    Bj Fish MD  Hematology and Stem Cell Transplant

## 2022-12-16 NOTE — PROGRESS NOTES
Route Chart for Scheduling    BMT Chart Routing      Follow up with physician . Anytime after 3/27 in benign heme clinic   Follow up with [facility]    Provider visit type Benign hem   Infusion scheduling note    Injection scheduling note    Labs    Imaging    Pharmacy appointment    Other referrals

## 2022-12-16 NOTE — PROGRESS NOTES
"[facility]      Chief Complaint   Patient presents with    Acute deep vein thrombosis (DVT) of iliac vein of left lowe    Shortness of Breath     With exertion        Patient ID: Amy Womack is a 42 y.o. female with HTN, LLE DVT, bilateral TOA's, who presents for an initial appointment.  Pertinent history/events are as follows:     -Pt kindly referred by Dr. Coleman for evaluation of Acute deep vein thrombosis (DVT) of iliac vein of left lower extremity.    -Pt hospitalized from 2022-2022 at [facility] for treatment of bilateral TOA's and LLE DVT in the setting of IUD (details below as per discharge summary):  "43 y/o  presents to our hospital as a transfer from [facility] ED.  Pt presented there on 22.  Symptoms started on  with nausea/vomiting and loose stools.  Was around some friends whose children had similar symptoms.  Over the next few days, her abdomen became progressively more distended, then started having pelvic pain (LLQ>RLQ) and fevers and chills.  Wednesday morning, she developed significant left groin and left upper thigh burning pain, so presented to the ED.  Work-up in the ED revealed a leukocytosis (WBC's 26.2), mild hypokalemia, and CT Abd/pelvis with multiple findings, but most significantly:  bilateral TOA's (3.6 x 7.2 cm on the right, and 6.6 x 7.5 cm on the left)  IUD low in the uterus  left hydrouretronephrosis likely due to mass effect from abscesses  Questionable LUQ intussusception with either SBO vs developing ileus.  Potential thrombus in left common and external iliac veins and left common femoral vein     Pelvic ultrasound: bilateral TOA's; IUD located within the cervix  LE Dopplers: DVT in left external iliac vein     Gyn hx significant for Mirena IUD in place for 6 years.  Achieved amenorrhea with it until 1.5 years ago, then has had regular, monthly periods since then.  Started having some light bleeding " today.  Pt was sexually active with her boyfriend of 4 years, but they broke up a few months ago.  Not sexually active since then.  Does not suspect any infidelity prior to their break-up.  GC/CT from [place] pending.  12/4/22--wbc decreased to 20K; pain improved, abx, replace potassium.    Hospital Course:  12/2/22: Transferred overnight from [place] for TOA. Continue IV abx. CT AP with contrast results pending. WBC 26.5. Patient states that she feels better than she did earlier this week. IUD removed overnight by Dr. Ledesma.   12/2/22--bilateral toa persist on ct scan, with Interval slight reduction in size of bilateral complex multiloculated tubo-ovarian abscesses, on the right measuring 6.8 x 3.9 cm previously 7.5 x 3.9 cm, and on the left today 5.5 x 4.5 cm, previously 7 x 6 cm    12/5/22- patient reports her pain has significantly improved. Tolerating oral intake. Meeting discharge criteria.    -On 12/14/2022, pt was sent from PCP's office (Dr. Coleman) to ED due to SOB with exertion.  CTA chest revealed no convincing evidence of acute pulmonary embolism.    HPI:  Ms. Womack reports feeling better.  She reports no chest pain or significant SOB. LLE Venous Ultrasound on 11/30/2022 revealed findings concerning for occlusive DVT within the partially imaged left external iliac vein, correlating with findings on recent cross-sectional imaging earlier same day.  CTA chest on 12/14/2022 rrevealed no convincing evidence of acute pulmonary embolism.  Exam shows LLE with no evidence of phlegmasia cerulea dolens.  She is tolerating lovenox 1 mg/kg subq every 12 hours with no bleeding issues.    Past Medical History:   Diagnosis Date    Hypertension      Past Surgical History:   Procedure Laterality Date    AUGMENTATION OF BREAST Bilateral     LIPOSUCTION       Social History     Socioeconomic History    Marital status: Single   Tobacco Use    Smoking status: Never    Smokeless tobacco: Never   Substance and  Sexual Activity    Alcohol use: Not Currently    Drug use: Never    Sexual activity: Not Currently     Partners: Male     Birth control/protection: I.U.D.     Social Determinants of Health     Financial Resource Strain: Low Risk     Difficulty of Paying Living Expenses: Not very hard   Food Insecurity: No Food Insecurity    Worried About Running Out of Food in the Last Year: Never true    Ran Out of Food in the Last Year: Never true   Transportation Needs: No Transportation Needs    Lack of Transportation (Medical): No    Lack of Transportation (Non-Medical): No   Physical Activity: Sufficiently Active    Days of Exercise per Week: 5 days    Minutes of Exercise per Session: 60 min   Stress: No Stress Concern Present    Feeling of Stress : Only a little   Social Connections: Moderately Isolated    Frequency of Communication with Friends and Family: More than three times a week    Frequency of Social Gatherings with Friends and Family: More than three times a week    Attends Anabaptist Services: More than 4 times per year    Active Member of Clubs or Organizations: No    Attends Club or Organization Meetings: Never    Marital Status:    Housing Stability: Low Risk     Unable to Pay for Housing in the Last Year: No    Number of Places Lived in the Last Year: 2    Unstable Housing in the Last Year: No     Family History   Problem Relation Age of Onset    Hypertension Mother     Stroke Father     Atrial fibrillation Father     Hypertension Father     Hypertension Maternal Grandmother     Stroke Maternal Grandfather     Hypertension Maternal Grandfather     Heart attack Paternal Grandmother     Hypertension Paternal Grandmother     Atrial fibrillation Paternal Grandmother     Heart attack Paternal Grandfather        Review of patient's allergies indicates:   Allergen Reactions    Macadamia nut oil Hives, Itching, Palpitations and Swelling       Medication List with Changes/Refills   Current Medications    CALCIUM  "CARBONATE (CALCIUM 600 ORAL)        DOXYCYCLINE (VIBRAMYCIN) 100 MG CAP    Take 1 capsule (100 mg total) by mouth 2 (two) times daily. for 14 days    DULOXETINE HCL (CYMBALTA ORAL)    Take by mouth.    ENOXAPARIN (LOVENOX) 80 MG/0.8 ML SYRG    Inject 0.7 mLs (70 mg total) into the skin every 12 (twelve) hours.    FERROUS FUMARATE/VIT BCOMP,C (SUPER B COMPLEX ORAL)        METRONIDAZOLE (FLAGYL) 500 MG TABLET    Take 1 tablet (500 mg total) by mouth every 12 (twelve) hours. for 14 days    SEMAGLUTIDE (OZEMPIC SUBQ)    Inject into the skin.       Review of Systems  Constitution: Denies chills, fever, and sweats.  HENT: Denies headaches or blurry vision.  Cardiovascular: Denies chest pain or irregular heart beat.  Respiratory: Denies cough or shortness of breath.  Gastrointestinal: Denies abdominal pain, nausea, or vomiting.  Musculoskeletal: Denies muscle cramps.  Neurological: Denies dizziness or focal weakness.  Psychiatric/Behavioral: Normal mental status.  Hematologic/Lymphatic: Denies bleeding problem or easy bruising/bleeding.  Skin: Denies rash or suspicious lesions    Physical Examination  BP (!) 159/82 (BP Location: Left arm, Patient Position: Sitting, BP Method: Medium (Automatic))   Pulse 94   Ht 4' 11" (1.499 m)   Wt 63.9 kg (140 lb 14 oz)   LMP 11/29/2022   SpO2 97%   BMI 28.45 kg/m²     Constitutional: No acute distress, conversant  HEENT: Sclera anicteric, Pupils equal, round and reactive to light, extraocular motions intact, Oropharynx clear  Neck: No JVD, no carotid bruits  Cardiovascular: regular rate and rhythm, no murmur, rubs or gallops, normal S1/S2  Pulmonary: Clear to auscultation bilaterally  Abdominal: Abdomen soft, nontender, nondistended, positive bowel sounds  Extremities: No lower extremity edema,   Pulses:  Carotid pulses are 2+ on the right side, and 2+ on the left side.  Radial pulses are 2+ on the right side, and 2+ on the left side.   Femoral pulses are 2+ on the right side, and " 2+ on the left side.  Popliteal pulses are 2+ on the right side, and 2+ on the left side.   Dorsalis pedis pulses are 2+ on the right side, and 2+ on the left side.   Posterior tibial pulses are 2+ on the right side, and 2+ on the left side.    Skin: No ecchymosis, erythema, or ulcers  Psych: Alert and oriented x 3, appropriate affect  Neuro: CNII-XII intact, no focal deficits    Labs:  Most Recent Data  CBC:   Lab Results   Component Value Date    WBC 8.83 12/14/2022    HGB 10.8 (L) 12/14/2022    HCT 35.3 (L) 12/14/2022     (H) 12/14/2022    MCV 79 (L) 12/14/2022    RDW 17.3 (H) 12/14/2022     BMP:   Lab Results   Component Value Date     (L) 12/14/2022    K 4.0 12/14/2022     12/14/2022    CO2 20 (L) 12/14/2022    BUN 9 12/14/2022    CREATININE 0.6 12/14/2022    GLU 87 12/14/2022    CALCIUM 9.8 12/14/2022    MG 1.6 12/04/2022     LFTS;   Lab Results   Component Value Date    PROT 7.5 12/14/2022    ALBUMIN 3.4 (L) 12/14/2022    BILITOT 0.3 12/14/2022    AST 21 12/14/2022    ALKPHOS 66 12/14/2022    ALT 18 12/14/2022     COAGS:   Lab Results   Component Value Date    INR 1.0 12/14/2022     FLP: No results found for: CHOL, HDL, LDLCALC, TRIG, CHOLHDL  CARDIAC:   Lab Results   Component Value Date    TROPONINI <0.006 12/14/2022    BNP 14 12/14/2022       Imaging:    CTA Chest 12/14/2022:  1. Examination considered diagnostic to the proximal segmental pulmonary arterial level without convincing evidence of acute pulmonary embolism.  2. No definite acute intrathoracic findings.    LLE Venous Ultrasound 11/30/2022:  Findings concerning for occlusive DVT within the partially imaged left external iliac vein, correlating with findings on recent cross-sectional imaging earlier same day.   No evidence of associated DVT within the left lower extremity at this time.    Assessment/Plan:  Amy Womack is a 42 y.o. female with HTN, LLE DVT, bilateral TOA's, who presents for an initial appointment.    Acute deep  vein thrombosis (DVT) of iliac vein of left lower extremity- Exact etiology unclear.  The potential role of the Mirena IUD in precipitating the DVT is not clear.  Refer to Heme/Onc for age appropriate cancer screening and ypercoagulable workup.  Continue lovenox 1 mg/kg subq every 12 hours.  Check CTA abd/pelvis to evaluate for evidence of May-Thurner syndrome.  Repeat BLE venous ultrasound in 3 months.    2. HTN- Continue current medications.    Will call pt with results of CT abd/pelvis   Follow up 3 months with BLE venous ultrasound prior     Total duration of face to face visit time 30 minutes.  Total time spent counseling greater than fifty percent of total visit time.  Counseling included discussion regarding imaging findings, diagnosis, possibilities, treatment options, risks and benefits.  The patient had many questions regarding the options and long-term effects.    Gary Schneider MD, PhD  Interventional Cardiology

## 2022-12-16 NOTE — PATIENT INSTRUCTIONS
Assessment/Plan:  Amy Womack is a 42 y.o. female with HTN, LLE DVT, bilateral TOA's, who presents for an initial appointment.    Acute deep vein thrombosis (DVT) of iliac vein of left lower extremity- Exact etiology unclear.  The potential role of the Mirena IUD in precipitating the DVT is not clear.  Refer to Heme/Onc for age appropriate cancer screening and ypercoagulable workup.  Continue lovenox 1 mg/kg subq every 12 hours.  Check CTA abd/pelvis to evaluate for evidence of May-Thurner syndrome.  Repeat BLE venous ultrasound in 3 months.    2. HTN- Continue current medications.    Will call pt with results of CT abd/pelvis   Follow up 3 months with BLE venous ultrasound prior

## 2022-12-17 ENCOUNTER — HOSPITAL ENCOUNTER (OUTPATIENT)
Dept: RADIOLOGY | Facility: HOSPITAL | Age: 42
Discharge: HOME | End: 2022-12-17
Attending: INTERNAL MEDICINE
Payer: OTHER GOVERNMENT

## 2022-12-17 DIAGNOSIS — I82.422 ACUTE DEEP VEIN THROMBOSIS (DVT) OF ILIAC VEIN OF LEFT LOWER EXTREMITY: ICD-10-CM

## 2022-12-17 PROCEDURE — 74177 CT ABD & PELVIS W/CONTRAST: CPT | Mod: 26,,, | Performed by: RADIOLOGY

## 2022-12-17 PROCEDURE — 25500020 PHARM REV CODE 255: Performed by: INTERNAL MEDICINE

## 2022-12-17 PROCEDURE — A9698 NON-RAD CONTRAST MATERIALNOC: HCPCS | Performed by: INTERNAL MEDICINE

## 2022-12-17 PROCEDURE — 74177 CT ABDOMEN PELVIS WITH CONTRAST: ICD-10-PCS | Mod: 26,,, | Performed by: RADIOLOGY

## 2022-12-17 PROCEDURE — 74177 CT ABD & PELVIS W/CONTRAST: CPT | Mod: TC

## 2022-12-17 RX ADMIN — IOHEXOL 100 ML: 350 INJECTION, SOLUTION INTRAVENOUS at 02:12

## 2022-12-17 RX ADMIN — BARIUM SULFATE 450 ML: 20 SUSPENSION ORAL at 02:12

## 2022-12-19 ENCOUNTER — TELEPHONE (OUTPATIENT)
Dept: OBSTETRICS AND GYNECOLOGY | Facility: CLINIC | Age: 42
End: 2022-12-19
Payer: OTHER GOVERNMENT

## 2022-12-19 PROBLEM — A41.9 SEPSIS WITHOUT ACUTE ORGAN DYSFUNCTION: Status: RESOLVED | Noted: 2022-12-02 | Resolved: 2022-12-19

## 2022-12-19 NOTE — TELEPHONE ENCOUNTER
Called pt. To reschedule appointment. She was inappropriately schedule on NP schedule. New appointment 12/23/22 at 8:30 am with Dr. Bennett

## 2022-12-19 NOTE — TELEPHONE ENCOUNTER
----- Message from Ellie Whiteside FNP-C sent at 12/19/2022  8:25 AM CST -----  Hi, Please call and schedule with an MD provider. Please let her know: I'm sorry you have been scheduled inappropriately with the nurse practitioner, due to the complexity of your medical history it is best for you to see one of our doctors.     Thanks.

## 2022-12-21 DIAGNOSIS — Z12.31 OTHER SCREENING MAMMOGRAM: ICD-10-CM

## 2022-12-22 ENCOUNTER — HOSPITAL ENCOUNTER (OUTPATIENT)
Dept: RADIOLOGY | Facility: HOSPITAL | Age: 42
Discharge: HOME | End: 2022-12-22
Attending: FAMILY MEDICINE
Payer: OTHER GOVERNMENT

## 2022-12-22 DIAGNOSIS — Z12.31 OTHER SCREENING MAMMOGRAM: ICD-10-CM

## 2022-12-22 PROCEDURE — 77063 BREAST TOMOSYNTHESIS BI: CPT | Mod: TC

## 2022-12-22 PROCEDURE — 77063 BREAST TOMOSYNTHESIS BI: CPT | Mod: 26,,, | Performed by: RADIOLOGY

## 2022-12-22 PROCEDURE — 77067 SCR MAMMO BI INCL CAD: CPT | Mod: 26,,, | Performed by: RADIOLOGY

## 2022-12-22 PROCEDURE — 77067 SCR MAMMO BI INCL CAD: CPT | Mod: TC

## 2022-12-22 PROCEDURE — 77063 MAMMO DIGITAL SCREENING BILAT WITH TOMO: ICD-10-PCS | Mod: 26,,, | Performed by: RADIOLOGY

## 2022-12-22 PROCEDURE — 77067 MAMMO DIGITAL SCREENING BILAT WITH TOMO: ICD-10-PCS | Mod: 26,,, | Performed by: RADIOLOGY

## 2022-12-23 ENCOUNTER — OFFICE VISIT (OUTPATIENT)
Dept: OBSTETRICS AND GYNECOLOGY | Facility: CLINIC | Age: 42
End: 2022-12-23
Payer: OTHER GOVERNMENT

## 2022-12-23 VITALS
DIASTOLIC BLOOD PRESSURE: 100 MMHG | SYSTOLIC BLOOD PRESSURE: 154 MMHG | BODY MASS INDEX: 28.32 KG/M2 | WEIGHT: 140.19 LBS

## 2022-12-23 DIAGNOSIS — Z30.09 ENCOUNTER FOR COUNSELING REGARDING CONTRACEPTION: ICD-10-CM

## 2022-12-23 DIAGNOSIS — N70.93 TOA (TUBO-OVARIAN ABSCESS): Primary | ICD-10-CM

## 2022-12-23 PROCEDURE — 99214 OFFICE O/P EST MOD 30 MIN: CPT | Mod: S$PBB,,, | Performed by: STUDENT IN AN ORGANIZED HEALTH CARE EDUCATION/TRAINING PROGRAM

## 2022-12-23 PROCEDURE — 99214 PR OFFICE/OUTPT VISIT, EST, LEVL IV, 30-39 MIN: ICD-10-PCS | Mod: S$PBB,,, | Performed by: STUDENT IN AN ORGANIZED HEALTH CARE EDUCATION/TRAINING PROGRAM

## 2022-12-23 PROCEDURE — 99999 PR PBB SHADOW E&M-EST. PATIENT-LVL III: ICD-10-PCS | Mod: PBBFAC,,, | Performed by: STUDENT IN AN ORGANIZED HEALTH CARE EDUCATION/TRAINING PROGRAM

## 2022-12-23 PROCEDURE — 99213 OFFICE O/P EST LOW 20 MIN: CPT | Mod: PBBFAC,PO | Performed by: STUDENT IN AN ORGANIZED HEALTH CARE EDUCATION/TRAINING PROGRAM

## 2022-12-23 PROCEDURE — 99999 PR PBB SHADOW E&M-EST. PATIENT-LVL III: CPT | Mod: PBBFAC,,, | Performed by: STUDENT IN AN ORGANIZED HEALTH CARE EDUCATION/TRAINING PROGRAM

## 2022-12-23 RX ORDER — ACETAMINOPHEN AND CODEINE PHOSPHATE 120; 12 MG/5ML; MG/5ML
1 SOLUTION ORAL DAILY
Qty: 90 TABLET | Refills: 4 | Status: SHIPPED | OUTPATIENT
Start: 2022-12-23

## 2022-12-23 RX ORDER — METRONIDAZOLE 500 MG/1
500 TABLET ORAL EVERY 12 HOURS
Qty: 28 TABLET | Refills: 0 | Status: SHIPPED | OUTPATIENT
Start: 2022-12-23 | End: 2023-01-06

## 2022-12-23 RX ORDER — DOXYCYCLINE HYCLATE 100 MG
100 TABLET ORAL 2 TIMES DAILY
Qty: 28 TABLET | Refills: 0 | Status: SHIPPED | OUTPATIENT
Start: 2022-12-23 | End: 2023-01-06

## 2022-12-23 NOTE — PROGRESS NOTES
CC: TOA    HPI:  Amy Womack is a 42 y.o. female  presents for follow up of TOA. She initially presented to the ED  for persistent N/V/D, thought she had a stomach bug that wouldn't resolve. Found to have bilateral TOA (right 7.2cm, left 9 cm) and left common/external iliac vein thrombus. Patient admitted for IV antibiotics, GC/CT negative, IUD was removed and she was started on anticoagulation. Patient repeat imaging on  with decreased size R 6.8cm, left 5.5cm. Discharged  in stable condition. Since that time patient has completed 2 weeks of oral doxy/flagyl, reports no fevers at home and is feeling generally well. She had repeat CT on  with size decrease again of TOAs, right now 5.1cm and left 3.6cm. She was started on eliquis with hematology, no issues so far.       ROS:  GENERAL: No fever, chills, fatigability or weight loss.  VULVAR: No pain, no lesions and no itching.  VAGINAL: No relaxation, no itching, no discharge, no abnormal bleeding and no lesions.  ABDOMEN: No abdominal pain. Denies nausea. Denies vomiting. No diarrhea. No constipation  BREAST: Denies pain. No lumps. No discharge.  URINARY: No incontinence, no nocturia, no frequency and no dysuria.  CARDIOVASCULAR: No chest pain. No shortness of breath. No leg cramps.  NEUROLOGICAL: No headaches. No vision changes.      Patient History:  Past Medical History:   Diagnosis Date    Hypertension      Past Surgical History:   Procedure Laterality Date    AUGMENTATION OF BREAST Bilateral     LIPOSUCTION       Social History     Tobacco Use    Smoking status: Never    Smokeless tobacco: Never   Substance Use Topics    Alcohol use: Not Currently    Drug use: Never     Family History   Problem Relation Age of Onset    Hypertension Mother     Stroke Father     Atrial fibrillation Father     Hypertension Father     Hypertension Maternal Grandmother     Stroke Maternal Grandfather     Hypertension Maternal Grandfather     Heart attack  Paternal Grandmother     Hypertension Paternal Grandmother     Atrial fibrillation Paternal Grandmother     Heart attack Paternal Grandfather      OB History    Para Term  AB Living   2 1 1 0 1 1   SAB IAB Ectopic Multiple Live Births   0 0 1 0 1      # Outcome Date GA Lbr Eladio/2nd Weight Sex Delivery Anes PTL Lv   2 Term     M Vag-Spont   CHAYO   1 Ectopic               Birth Comments: treated with MTX       Objective:   BP (!) 154/100 (BP Location: Right arm, Patient Position: Sitting)   Wt 63.6 kg (140 lb 3.4 oz)   LMP 2022   BMI 28.32 kg/m²   Patient's last menstrual period was 2022.      PHYSICAL EXAM:  APPEARANCE: Well nourished, well developed, in no acute distress.  AFFECT: WNL, alert and oriented x 3  SKIN: No acne or hirsutism  NECK: Neck symmetric without masses or thyromegaly  NODES: No inguinal, cervical, axillary, or femoral lymph node enlargement  CHEST: Good respiratory effect  EXTREMITIES: No edema.      ASSESSMENT and PLAN:    ICD-10-CM ICD-9-CM    1. TOA (tubo-ovarian abscess)  N70.93 614.2 doxycycline (VIBRA-TABS) 100 MG tablet      metroNIDAZOLE (FLAGYL) 500 MG tablet      US Pelvis Comp with Transvag NON-OB (xpd      2. Encounter for counseling regarding contraception  Z30.09 V25.09 norethindrone (MICRONOR) 0.35 mg tablet          TOA  - reviewed patient clinical course, outside provider notes, imaging and labs  - counseled patient on decreasing size and improvement of symptoms of the TOAs show response to abx but recommend continuation of abx until resolution of abscesses on imaging (can be up to 4-6 weeks of outpatient antibiotics)   - will complete two weeks more now of doxy/flagyl then repeat imaging. TVUS ordered/scheduled for 2 weeks   - pain mild/manageable, patient using OTC meds and heat pack   - discussed return precautions     2. Contraception counseling   - patient had IUD removed, would like to start alternative contraception   - discussed at this time  would recommend progesterone only methods for pregnancy protection due to blood clot/anticoagulation, risk of thrombosis much higher with estrogen containing contraception   - reviewed available options including POPs, depo, nexplanon. Patient would like to start progesterone pills today, rx sent to patient pharmacy       Follow up: as needed if symptoms worsen or in 3-6 months         Stephanie Heaney, MD OBGYN [facility]

## 2023-03-02 ENCOUNTER — PATIENT MESSAGE (OUTPATIENT)
Dept: RESEARCH | Facility: HOSPITAL | Age: 43
End: 2023-03-02
Payer: OTHER GOVERNMENT

## 2023-03-16 ENCOUNTER — PATIENT OUTREACH (OUTPATIENT)
Dept: ADMINISTRATIVE | Facility: HOSPITAL | Age: 43
End: 2023-03-16
Payer: OTHER GOVERNMENT

## 2023-03-16 NOTE — PROGRESS NOTES
Health Maintenance Due   Topic Date Due    Cervical Cancer Screening  Never done    Lipid Panel  Never done    COVID-19 Vaccine (1) Never done    TETANUS VACCINE  Never done    Hemoglobin A1c (Diabetic Prevention Screening)  Never done      Chart reviewed. Triggered LINKS. Updated Care Everywhere.     Aram Juarez CMA  Population Health Care Coordinator  Primary Care Team

## 2023-04-03 ENCOUNTER — PATIENT MESSAGE (OUTPATIENT)
Dept: ADMINISTRATIVE | Facility: HOSPITAL | Age: 43
End: 2023-04-03
Payer: OTHER GOVERNMENT

## 2024-01-03 DIAGNOSIS — Z12.31 OTHER SCREENING MAMMOGRAM: ICD-10-CM

## 2024-01-08 ENCOUNTER — PATIENT MESSAGE (OUTPATIENT)
Dept: ADMINISTRATIVE | Facility: HOSPITAL | Age: 44
End: 2024-01-08
Payer: OTHER GOVERNMENT

## 2024-06-10 ENCOUNTER — PATIENT MESSAGE (OUTPATIENT)
Dept: INTERNAL MEDICINE | Facility: CLINIC | Age: 44
End: 2024-06-10
Payer: OTHER GOVERNMENT

## 2025-01-09 ENCOUNTER — HOSPITAL ENCOUNTER (OUTPATIENT)
Dept: RADIOLOGY | Facility: HOSPITAL | Age: 45
Discharge: HOME | End: 2025-01-09
Attending: FAMILY MEDICINE
Payer: COMMERCIAL

## 2025-01-09 ENCOUNTER — OFFICE VISIT (OUTPATIENT)
Dept: INTERNAL MEDICINE | Facility: CLINIC | Age: 45
End: 2025-01-09
Attending: FAMILY MEDICINE
Payer: COMMERCIAL

## 2025-01-09 VITALS
WEIGHT: 157.63 LBS | DIASTOLIC BLOOD PRESSURE: 100 MMHG | BODY MASS INDEX: 31.78 KG/M2 | HEIGHT: 59 IN | SYSTOLIC BLOOD PRESSURE: 160 MMHG | HEART RATE: 100 BPM | OXYGEN SATURATION: 95 %

## 2025-01-09 DIAGNOSIS — R03.0 ELEVATED BLOOD PRESSURE READING: ICD-10-CM

## 2025-01-09 DIAGNOSIS — Z00.00 ANNUAL PHYSICAL EXAM: Primary | ICD-10-CM

## 2025-01-09 DIAGNOSIS — Z12.31 OTHER SCREENING MAMMOGRAM: ICD-10-CM

## 2025-01-09 DIAGNOSIS — F32.9 MAJOR DEPRESSIVE DISORDER, REMISSION STATUS UNSPECIFIED, UNSPECIFIED WHETHER RECURRENT: ICD-10-CM

## 2025-01-09 DIAGNOSIS — Z86.718 HISTORY OF DVT OF LOWER EXTREMITY: ICD-10-CM

## 2025-01-09 PROBLEM — T83.32XA IUD MIGRATION: Status: RESOLVED | Noted: 2022-12-02 | Resolved: 2025-01-09

## 2025-01-09 PROBLEM — K56.7 ILEUS, UNSPECIFIED: Status: RESOLVED | Noted: 2022-12-02 | Resolved: 2025-01-09

## 2025-01-09 PROBLEM — N70.93 TOA (TUBO-OVARIAN ABSCESS): Status: RESOLVED | Noted: 2022-12-02 | Resolved: 2025-01-09

## 2025-01-09 PROCEDURE — 3008F BODY MASS INDEX DOCD: CPT | Mod: CPTII,S$GLB,, | Performed by: FAMILY MEDICINE

## 2025-01-09 PROCEDURE — 77067 SCR MAMMO BI INCL CAD: CPT | Mod: TC

## 2025-01-09 PROCEDURE — 99396 PREV VISIT EST AGE 40-64: CPT | Mod: S$GLB,,, | Performed by: FAMILY MEDICINE

## 2025-01-09 PROCEDURE — 1159F MED LIST DOCD IN RCRD: CPT | Mod: CPTII,S$GLB,, | Performed by: FAMILY MEDICINE

## 2025-01-09 PROCEDURE — 3077F SYST BP >= 140 MM HG: CPT | Mod: CPTII,S$GLB,, | Performed by: FAMILY MEDICINE

## 2025-01-09 PROCEDURE — 1160F RVW MEDS BY RX/DR IN RCRD: CPT | Mod: CPTII,S$GLB,, | Performed by: FAMILY MEDICINE

## 2025-01-09 PROCEDURE — 3080F DIAST BP >= 90 MM HG: CPT | Mod: CPTII,S$GLB,, | Performed by: FAMILY MEDICINE

## 2025-01-09 PROCEDURE — 99999 PR PBB SHADOW E&M-EST. PATIENT-LVL V: CPT | Mod: PBBFAC,,, | Performed by: FAMILY MEDICINE

## 2025-01-09 PROCEDURE — 77063 BREAST TOMOSYNTHESIS BI: CPT | Mod: 26,,, | Performed by: RADIOLOGY

## 2025-01-09 PROCEDURE — 77067 SCR MAMMO BI INCL CAD: CPT | Mod: 26,,, | Performed by: RADIOLOGY

## 2025-01-09 RX ORDER — DULOXETIN HYDROCHLORIDE 20 MG/1
20 CAPSULE, DELAYED RELEASE ORAL DAILY
Qty: 90 CAPSULE | Refills: 1 | Status: SHIPPED | OUTPATIENT
Start: 2025-01-09

## 2025-01-09 NOTE — PROGRESS NOTES
Subjective:       Patient ID: Amy Womack is a 44 y.o. female.    Chief Complaint: Annual Exam    Established patient for an annual wellness check/physical exam and also chronic disease management. Specific complaints - see dictation, M*model entries and please see ROS.  Past, Surgical, Family, Social Histories; Medications, Allergies reviewed and reconciled.  Health maintenance file reviewed and addressed items due. Recent applicable lab, imaging and cardiovascular results reviewed.  Problem list items reviewed and modified or added entries (in the overview section) may not be transcribed into this encounter note due to note writer format.      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  Also member of [employer].  Describes work life and relationship problems.  Some stress.  Some dysphoria.  Some anxiety.  Had taken duloxetine in the past with success.  Had a long discussion today.  We can consult for counseling.  Trial of duloxetine.  Follow up in one-month for BP recheck which was elevated today; medication assessment and possible titration.      Review of Systems   Constitutional:  Positive for activity change and unexpected weight change.   HENT:  Negative for hearing loss, rhinorrhea and trouble swallowing.    Eyes:  Negative for discharge and visual disturbance.   Respiratory:  Negative for chest tightness and wheezing.    Cardiovascular:  Negative for chest pain and palpitations.   Gastrointestinal:  Negative for blood in stool, constipation, diarrhea and vomiting.   Endocrine: Negative for polydipsia and polyuria.   Genitourinary:  Positive for menstrual problem. Negative for difficulty urinating, dysuria and hematuria.   Musculoskeletal:  Negative for arthralgias, joint swelling and neck pain.   Neurological:  Positive for headaches. Negative for weakness.   Psychiatric/Behavioral:  Positive for dysphoric mood. Negative for confusion and self-injury.        Objective:      Physical  Exam  Vitals and nursing note reviewed.   Constitutional:       Appearance: She is well-developed. She is not diaphoretic.   Eyes:      General: No scleral icterus.  Neck:      Thyroid: No thyromegaly.      Vascular: No JVD.      Trachea: No tracheal deviation.   Cardiovascular:      Rate and Rhythm: Normal rate.      Heart sounds: Normal heart sounds. No murmur heard.     No friction rub. No gallop.   Pulmonary:      Effort: Pulmonary effort is normal. No respiratory distress.      Breath sounds: Normal breath sounds. No wheezing or rales.   Abdominal:      General: There is no distension or abdominal bruit.      Palpations: Abdomen is soft. There is no mass.      Tenderness: There is no abdominal tenderness. There is no guarding or rebound.   Musculoskeletal:      Cervical back: Normal range of motion and neck supple.   Lymphadenopathy:      Cervical: No cervical adenopathy.   Skin:     General: Skin is warm and dry.      Findings: No erythema or rash.   Neurological:      Mental Status: She is alert and oriented to person, place, and time.      Cranial Nerves: No cranial nerve deficit.      Motor: No tremor.      Coordination: Coordination normal.      Gait: Gait normal.   Psychiatric:         Mood and Affect: Mood is depressed. Affect is tearful.         Speech: Speech normal.         Behavior: Behavior normal.         Thought Content: Thought content normal.         Cognition and Memory: Cognition normal.         Judgment: Judgment normal.       Assessment:       1. Annual physical exam    2. History of DVT of lower extremity    3. Elevated blood pressure reading    4. Major depressive disorder, remission status unspecified, unspecified whether recurrent        Plan:     Medication List with Changes/Refills   Current Medications    CALCIUM CARBONATE (CALCIUM 600 ORAL)        FERROUS FUMARATE/VIT BCOMP,C (SUPER B COMPLEX ORAL)        NORETHINDRONE (MICRONOR) 0.35 MG TABLET    Take 1 tablet (0.35 mg total) by  mouth once daily.   Changed and/or Refilled Medications    Modified Medication Previous Medication    DULOXETINE (CYMBALTA) 20 MG CAPSULE duloxetine HCl (CYMBALTA ORAL)       Take 1 capsule (20 mg total) by mouth once daily.    Take by mouth.   Discontinued Medications    APIXABAN (ELIQUIS) 5 MG TAB    Take 1 tablet (5 mg total) by mouth 2 (two) times daily.    SEMAGLUTIDE (OZEMPIC SUBQ)    Inject into the skin.     1. Annual physical exam  -     Hemoglobin A1C; Future; Expected date: 01/09/2025  -     Comprehensive Metabolic Panel; Future; Expected date: 01/09/2025  -     Lipid Panel; Future; Expected date: 01/09/2025  -     CBC Without Differential; Future; Expected date: 01/09/2025  -     TSH; Future; Expected date: 01/09/2025    2. History of DVT of lower extremity  Overview:  -LLE related to tubo-ovarian abscess, 2022.  -seen by Dr. Schneider and Dr. Fish 12/16/2022, considered provoked  -AC for 3-4 months, f/u (ext) US LLE neg 4/2023      3. Elevated blood pressure reading    4. Major depressive disorder, remission status unspecified, unspecified whether recurrent  -     Ambulatory referral/consult to Psychology; Future; Expected date: 01/16/2025  -     DULoxetine (CYMBALTA) 20 MG capsule; Take 1 capsule (20 mg total) by mouth once daily.  Dispense: 90 capsule; Refill: 1      See meds, orders, follow up, routing and instructions sections of encounter and AVS. Discussed with patient and provided on AVS.    Lab Results   Component Value Date     (L) 12/14/2022    K 4.0 12/14/2022     12/14/2022    BUN 9 12/14/2022    CREATININE 0.6 12/14/2022    GLU 87 12/14/2022    MG 1.6 12/04/2022    AST 21 12/14/2022    ALT 18 12/14/2022    ALBUMIN 3.4 (L) 12/14/2022    PROT 7.5 12/14/2022    BILITOT 0.3 12/14/2022    WBC 8.83 12/14/2022    HGB 10.8 (L) 12/14/2022    HCT 35.3 (L) 12/14/2022     (H) 12/14/2022    BNP 14 12/14/2022         Diabetes Management Status    Statin: Not taking  ACE/ARB: Not  "taking    Screening or Prevention Patient's value Goal Complete/Controlled?   HgA1C Testing and Control   No results found for: "HGBA1C"   Annually/Less than 8% No   Lipid profile Most Recent Lipid Panel Health Maintenance Topic Completion: Not Found Annually No   LDL control No results found for: "LDLCALC" Annually/Less than 100 mg/dl  No   Nephropathy screening No results found for: "LABMICR"  Lab Results   Component Value Date    PROTEINUA Negative 12/14/2022     No results found for: "UTPCR"   Annually No   Blood pressure BP Readings from Last 1 Encounters:   01/09/25 (!) 160/100    Less than 140/90 No   Dilated retinal exam Most Recent Eye Exam Date: Not Found Annually No   Foot exam   Most Recent Foot Exam Date: Not Found Annually No       "

## 2025-01-09 NOTE — PATIENT INSTRUCTIONS
Schedule lab orders for today.      [phone] - [facility]  Clinical  or Psychologist

## 2025-01-15 ENCOUNTER — OFFICE VISIT (OUTPATIENT)
Dept: OBSTETRICS AND GYNECOLOGY | Facility: CLINIC | Age: 45
End: 2025-01-15
Payer: COMMERCIAL

## 2025-01-15 ENCOUNTER — LAB VISIT (OUTPATIENT)
Dept: LAB | Facility: HOSPITAL | Age: 45
End: 2025-01-15
Attending: STUDENT IN AN ORGANIZED HEALTH CARE EDUCATION/TRAINING PROGRAM
Payer: COMMERCIAL

## 2025-01-15 ENCOUNTER — PATIENT MESSAGE (OUTPATIENT)
Dept: OBSTETRICS AND GYNECOLOGY | Facility: CLINIC | Age: 45
End: 2025-01-15

## 2025-01-15 VITALS
DIASTOLIC BLOOD PRESSURE: 108 MMHG | HEIGHT: 59 IN | SYSTOLIC BLOOD PRESSURE: 160 MMHG | WEIGHT: 151.56 LBS | BODY MASS INDEX: 30.56 KG/M2

## 2025-01-15 DIAGNOSIS — Z71.3 WEIGHT LOSS COUNSELING, ENCOUNTER FOR: ICD-10-CM

## 2025-01-15 DIAGNOSIS — N92.6 IRREGULAR PERIODS/MENSTRUAL CYCLES: Primary | ICD-10-CM

## 2025-01-15 DIAGNOSIS — Z01.419 WELL WOMAN EXAM WITH ROUTINE GYNECOLOGICAL EXAM: ICD-10-CM

## 2025-01-15 DIAGNOSIS — I10 PRIMARY HYPERTENSION: ICD-10-CM

## 2025-01-15 DIAGNOSIS — N92.6 IRREGULAR PERIODS/MENSTRUAL CYCLES: ICD-10-CM

## 2025-01-15 LAB
ESTRADIOL SERPL-MCNC: 49 PG/ML
FSH SERPL-ACNC: 29.86 MIU/ML
PROLACTIN SERPL IA-MCNC: 12.6 NG/ML (ref 5.2–26.5)
TESTOST SERPL-MCNC: 35 NG/DL (ref 5–73)

## 2025-01-15 PROCEDURE — 84146 ASSAY OF PROLACTIN: CPT | Performed by: STUDENT IN AN ORGANIZED HEALTH CARE EDUCATION/TRAINING PROGRAM

## 2025-01-15 PROCEDURE — 99214 OFFICE O/P EST MOD 30 MIN: CPT | Mod: 25,S$GLB,, | Performed by: STUDENT IN AN ORGANIZED HEALTH CARE EDUCATION/TRAINING PROGRAM

## 2025-01-15 PROCEDURE — 99999 PR PBB SHADOW E&M-EST. PATIENT-LVL III: CPT | Mod: PBBFAC,,, | Performed by: STUDENT IN AN ORGANIZED HEALTH CARE EDUCATION/TRAINING PROGRAM

## 2025-01-15 PROCEDURE — 82670 ASSAY OF TOTAL ESTRADIOL: CPT | Performed by: STUDENT IN AN ORGANIZED HEALTH CARE EDUCATION/TRAINING PROGRAM

## 2025-01-15 PROCEDURE — 36415 COLL VENOUS BLD VENIPUNCTURE: CPT | Performed by: STUDENT IN AN ORGANIZED HEALTH CARE EDUCATION/TRAINING PROGRAM

## 2025-01-15 PROCEDURE — 3008F BODY MASS INDEX DOCD: CPT | Mod: CPTII,S$GLB,, | Performed by: STUDENT IN AN ORGANIZED HEALTH CARE EDUCATION/TRAINING PROGRAM

## 2025-01-15 PROCEDURE — 87624 HPV HI-RISK TYP POOLED RSLT: CPT | Performed by: STUDENT IN AN ORGANIZED HEALTH CARE EDUCATION/TRAINING PROGRAM

## 2025-01-15 PROCEDURE — 83001 ASSAY OF GONADOTROPIN (FSH): CPT | Performed by: STUDENT IN AN ORGANIZED HEALTH CARE EDUCATION/TRAINING PROGRAM

## 2025-01-15 PROCEDURE — 1159F MED LIST DOCD IN RCRD: CPT | Mod: CPTII,S$GLB,, | Performed by: STUDENT IN AN ORGANIZED HEALTH CARE EDUCATION/TRAINING PROGRAM

## 2025-01-15 PROCEDURE — 84403 ASSAY OF TOTAL TESTOSTERONE: CPT | Performed by: STUDENT IN AN ORGANIZED HEALTH CARE EDUCATION/TRAINING PROGRAM

## 2025-01-15 PROCEDURE — 3077F SYST BP >= 140 MM HG: CPT | Mod: CPTII,S$GLB,, | Performed by: STUDENT IN AN ORGANIZED HEALTH CARE EDUCATION/TRAINING PROGRAM

## 2025-01-15 PROCEDURE — 99396 PREV VISIT EST AGE 40-64: CPT | Mod: S$GLB,,, | Performed by: STUDENT IN AN ORGANIZED HEALTH CARE EDUCATION/TRAINING PROGRAM

## 2025-01-15 PROCEDURE — 3080F DIAST BP >= 90 MM HG: CPT | Mod: CPTII,S$GLB,, | Performed by: STUDENT IN AN ORGANIZED HEALTH CARE EDUCATION/TRAINING PROGRAM

## 2025-01-15 PROCEDURE — 88175 CYTOPATH C/V AUTO FLUID REDO: CPT | Performed by: STUDENT IN AN ORGANIZED HEALTH CARE EDUCATION/TRAINING PROGRAM

## 2025-01-15 PROCEDURE — 3044F HG A1C LEVEL LT 7.0%: CPT | Mod: CPTII,S$GLB,, | Performed by: STUDENT IN AN ORGANIZED HEALTH CARE EDUCATION/TRAINING PROGRAM

## 2025-01-15 NOTE — PROGRESS NOTES
CC: Well woman exam    HPI:  Amy Womack is a 44 y.o. female  presents for a well woman exam.  She reports inability to lose weight despite exercise/diet, having more irregular periods, menopausal like symptoms. Since her blood clot and TOA in 2022, she is no longer on eliquis and bleeding still has not gotten better. Periods are heavy, irreuglar, spotting and clots, sometimes lasting two weeks, with bad cramping. New HTN, has follow up with PCP to discuss management      Patient history:   Past Medical History:   Diagnosis Date    Hypertension     Ileus, unspecified 2022    IUD migration 2022    TOA (tubo-ovarian abscess) 2022     Past Surgical History:   Procedure Laterality Date    AUGMENTATION OF BREAST Bilateral     LIPOSUCTION       OB History    Para Term  AB Living   2 1 1 0 1 1   SAB IAB Ectopic Multiple Live Births   0 0 1 0 1      # Outcome Date GA Lbr Eladio/2nd Weight Sex Type Anes PTL Lv   2 Term     M Vag-Spont   CHAYO   1 Ectopic               Birth Comments: treated with MTX       GYN  Menopausal: No  History of abnormal paps: DENIES  Abnormal or postmenopausal bleeding:  yes  History of abnormal mammograms:DENIES   Family history of breast or ovarian cancer: DENIES  Any breast masses, pain, skin changes, or nipple discharge: DENIES  Possible recent STD exposure: denies  Contraception: None    Pap: Done today  Mammogram: BIRADS1, T-C=4.72% 2025      Family History   Problem Relation Name Age of Onset    Hypertension Mother      Stroke Father      Atrial fibrillation Father      Hypertension Father      Hypertension Maternal Grandmother      Stroke Maternal Grandfather      Hypertension Maternal Grandfather      Heart attack Paternal Grandmother      Hypertension Paternal Grandmother      Atrial fibrillation Paternal Grandmother      Heart attack Paternal Grandfather       Social History     Tobacco Use    Smoking status: Never    Smokeless tobacco: Never  "  Substance Use Topics    Alcohol use: Not Currently    Drug use: Never     Allergies: Macadamia nut oil    Current Outpatient Medications:     calcium carbonate (CALCIUM 600 ORAL), , Disp: , Rfl:     DULoxetine (CYMBALTA) 20 MG capsule, Take 1 capsule (20 mg total) by mouth once daily., Disp: 90 capsule, Rfl: 1    ferrous fumarate/vit Bcomp,C (SUPER B COMPLEX ORAL), , Disp: , Rfl:     norethindrone (MICRONOR) 0.35 mg tablet, Take 1 tablet (0.35 mg total) by mouth once daily., Disp: 90 tablet, Rfl: 4    semaglutide (OZEMPIC) 0.25 mg or 0.5 mg (2 mg/3 mL) pen injector, Inject 0.25 mg into the skin every 7 days., Disp: 1.5 mL, Rfl: 0       ROS:  GENERAL: Denies weight loss. Feeling well overall.   SKIN: Denies rash or lesions.   HEAD: Denies head injury or headache.   NODES: Denies enlarged lymph nodes.   CHEST: Denies chest pain or shortness of breath.   CARDIOVASCULAR: Denies palpitations or left sided chest pain.   ABDOMEN: No abdominal pain, constipation, diarrhea, nausea, vomiting or rectal bleeding.   URINARY: No frequency, dysuria, hematuria, or burning on urination.  REPRODUCTIVE: See HPI.   BREASTS: The patient performs breast self-examination and denies pain, lumps, or nipple discharge.   HEMATOLOGIC: No easy bruisability or excessive bleeding.  MUSCULOSKELETAL: Denies joint pain or swelling.   NEUROLOGIC: Denies syncope or weakness.   PSYCHIATRIC: Denies depression, anxiety or mood swings.    Objective:   BP (!) 160/108   Ht 4' 11" (1.499 m)   Wt 68.8 kg (151 lb 9.1 oz)   LMP 01/03/2025 (Exact Date)   BMI 30.61 kg/m²       Physical Exam:  APPEARANCE: Well nourished, well developed, in no acute distress.  AFFECT: WNL, alert and oriented x 3  SKIN: No acne or hirsutism  NECK: Neck symmetric without masses or thyromegaly  CHEST: Good respiratory effect  ABDOMEN: Soft.  No tenderness or masses.  No hepatosplenomegaly.  No hernias.  BREASTS: deferred   PELVIC: Normal external genitalia without lesions.  " Normal hair distribution.  Adequate perineal body, normal urethral meatus.  Vagina moist and well rugated without lesions or discharge.  Cervix pink, without lesions, discharge or tenderness.  No significant cystocele or rectocele.    EXTREMITIES: No edema.    ASSESSMENT AND PLAN  1. Irregular periods/menstrual cycles  Prolactin    US Pelvis Comp with Transvag NON-OB (xpd    ESTRADIOL    FOLLICLE STIMULATING HORMONE    Testosterone    US Pelvis Comp with Transvag NON-OB (xpd      2. Well woman exam with routine gynecological exam  Liquid-Based Pap Smear, Screening    HPV High Risk Genotypes, PCR      3. Weight loss counseling, encounter for        4. BMI 30.0-30.9,adult  semaglutide (OZEMPIC) 0.25 mg or 0.5 mg (2 mg/3 mL) pen injector      5. Primary hypertension  semaglutide (OZEMPIC) 0.25 mg or 0.5 mg (2 mg/3 mL) pen injector          Annual exam  pelvic exam: wnl  Patient counseled on ASCCP guidelines for cervical cytology screening  Cervical screening: done today   Patient counseled on current recommendations for breast cancer screening  Mammogram screening: up to date  STD testing: not requested today   Osteoporosis screening at 65    2. AUB  - discussed with patient possible etiologies of bleeding including hormonal abnormalities and structural abnormalities   - labs/TVUS ordered   - Discussed medical and surgical treatment options with patient including OCP's, progesterone only (norethindrone 5mg daily vs provera 10-30mg daily), Lysteda, Mirena IUD, myfembree/oriahnn or surgical UAE/acessa if fibroids present, endometrial ablation, and hysterectomy.         3. Weight loss counseling   - discussed benefits of weight loss (goal at least 5-7% of total body weight) to her overall health and risk of developing CV disease/other co morbidities   - reviewed medication options available to help with weight loss and associated risks/benefits, side effects: Larry/orlistat, Qsymia (Phentermine/Topiramate), Contrave  (naltrexone/buproprion), Adipex (phentermine), semaglutide (Wegovy, Ozempic)   - patient has decided she would like to try semaglutide, rx sent to pharmacy   - weight/check in 3 months to ensure compliance and weight loss (if have not lost at least 3-5% in 12 weeks should discontinue)   - patient to notify MD of any concerns with medication side effects for evaluation          She was counseled to follow up with her PCP for other routine health maintenance      Follow up in about 1 year (around 1/15/2026), or if symptoms worsen or fail to improve.      Joy Bennett MD  OBGYN [facility]

## 2025-01-17 ENCOUNTER — DOCUMENTATION ONLY (OUTPATIENT)
Dept: OBSTETRICS AND GYNECOLOGY | Facility: CLINIC | Age: 45
End: 2025-01-17
Payer: COMMERCIAL

## 2025-01-17 NOTE — PROGRESS NOTES
PA completed for semaglutide      MIRNA BROWN (Key: [id number])  Rx #: [id number]  Ozempic (0.25 or 0.5 MG/DOSE) 2MG/3ML pen-injectors  Form  [facility] Electronic PA Form (2017 NCPDP)  Created  5 hours ago  Sent to Plan  4 minutes ago  Plan Response  4 minutes ago  Submit Clinical Questions  2 minutes ago  Determination  Wait for Determination  [facility].

## 2025-01-26 LAB
FINAL PATHOLOGIC DIAGNOSIS: NORMAL
Lab: NORMAL

## 2025-01-27 ENCOUNTER — HOSPITAL ENCOUNTER (OUTPATIENT)
Dept: RADIOLOGY | Facility: HOSPITAL | Age: 45
Discharge: HOME | End: 2025-01-27
Attending: STUDENT IN AN ORGANIZED HEALTH CARE EDUCATION/TRAINING PROGRAM
Payer: COMMERCIAL

## 2025-01-27 DIAGNOSIS — N92.6 IRREGULAR PERIODS/MENSTRUAL CYCLES: ICD-10-CM

## 2025-01-27 PROCEDURE — 76856 US EXAM PELVIC COMPLETE: CPT | Mod: 26,,, | Performed by: RADIOLOGY

## 2025-01-27 PROCEDURE — 76830 TRANSVAGINAL US NON-OB: CPT | Mod: 26,,, | Performed by: RADIOLOGY

## 2025-01-27 PROCEDURE — 76856 US EXAM PELVIC COMPLETE: CPT | Mod: TC

## 2025-02-19 ENCOUNTER — PATIENT MESSAGE (OUTPATIENT)
Dept: OBSTETRICS AND GYNECOLOGY | Facility: CLINIC | Age: 45
End: 2025-02-19
Payer: COMMERCIAL

## 2025-02-19 DIAGNOSIS — N92.6 IRREGULAR PERIODS/MENSTRUAL CYCLES: Primary | ICD-10-CM

## 2025-02-19 DIAGNOSIS — N83.201 RIGHT OVARIAN CYST: ICD-10-CM

## 2025-02-19 DIAGNOSIS — N93.9 ABNORMAL UTERINE BLEEDING (AUB): ICD-10-CM

## 2025-02-24 ENCOUNTER — TELEPHONE (OUTPATIENT)
Dept: OBSTETRICS AND GYNECOLOGY | Facility: CLINIC | Age: 45
End: 2025-02-24
Payer: COMMERCIAL

## 2025-02-24 ENCOUNTER — OFFICE VISIT (OUTPATIENT)
Dept: INTERNAL MEDICINE | Facility: CLINIC | Age: 45
End: 2025-02-24
Attending: FAMILY MEDICINE
Payer: COMMERCIAL

## 2025-02-24 VITALS
HEIGHT: 59 IN | HEART RATE: 86 BPM | SYSTOLIC BLOOD PRESSURE: 160 MMHG | DIASTOLIC BLOOD PRESSURE: 82 MMHG | BODY MASS INDEX: 31.55 KG/M2 | WEIGHT: 156.5 LBS

## 2025-02-24 DIAGNOSIS — E78.5 HYPERLIPIDEMIA, UNSPECIFIED HYPERLIPIDEMIA TYPE: ICD-10-CM

## 2025-02-24 DIAGNOSIS — I10 HYPERTENSION, ESSENTIAL: Primary | ICD-10-CM

## 2025-02-24 DIAGNOSIS — F32.9 MAJOR DEPRESSIVE DISORDER WITH CURRENT ACTIVE EPISODE, UNSPECIFIED DEPRESSION EPISODE SEVERITY, UNSPECIFIED WHETHER RECURRENT: ICD-10-CM

## 2025-02-24 DIAGNOSIS — R51.9 NONINTRACTABLE HEADACHE, UNSPECIFIED CHRONICITY PATTERN, UNSPECIFIED HEADACHE TYPE: ICD-10-CM

## 2025-02-24 PROBLEM — R03.0 ELEVATED BLOOD PRESSURE READING: Status: RESOLVED | Noted: 2022-12-02 | Resolved: 2025-02-24

## 2025-02-24 PROCEDURE — 3079F DIAST BP 80-89 MM HG: CPT | Mod: CPTII,S$GLB,, | Performed by: FAMILY MEDICINE

## 2025-02-24 PROCEDURE — 99999 PR PBB SHADOW E&M-EST. PATIENT-LVL III: CPT | Mod: PBBFAC,,, | Performed by: FAMILY MEDICINE

## 2025-02-24 PROCEDURE — 3077F SYST BP >= 140 MM HG: CPT | Mod: CPTII,S$GLB,, | Performed by: FAMILY MEDICINE

## 2025-02-24 PROCEDURE — 99213 OFFICE O/P EST LOW 20 MIN: CPT | Mod: S$GLB,,, | Performed by: FAMILY MEDICINE

## 2025-02-24 PROCEDURE — 1160F RVW MEDS BY RX/DR IN RCRD: CPT | Mod: CPTII,S$GLB,, | Performed by: FAMILY MEDICINE

## 2025-02-24 PROCEDURE — 3044F HG A1C LEVEL LT 7.0%: CPT | Mod: CPTII,S$GLB,, | Performed by: FAMILY MEDICINE

## 2025-02-24 PROCEDURE — 3008F BODY MASS INDEX DOCD: CPT | Mod: CPTII,S$GLB,, | Performed by: FAMILY MEDICINE

## 2025-02-24 PROCEDURE — 1159F MED LIST DOCD IN RCRD: CPT | Mod: CPTII,S$GLB,, | Performed by: FAMILY MEDICINE

## 2025-02-24 PROCEDURE — 4010F ACE/ARB THERAPY RXD/TAKEN: CPT | Mod: CPTII,S$GLB,, | Performed by: FAMILY MEDICINE

## 2025-02-24 RX ORDER — ALBUTEROL SULFATE 90 UG/1
INHALANT RESPIRATORY (INHALATION)
COMMUNITY
Start: 2024-11-04

## 2025-02-24 RX ORDER — LISINOPRIL 20 MG/1
20 TABLET ORAL DAILY
Qty: 90 TABLET | Refills: 1 | Status: SHIPPED | OUTPATIENT
Start: 2025-02-24

## 2025-02-24 NOTE — TELEPHONE ENCOUNTER
----- Message from Joy Bennett MD sent at 2/20/2025 12:52 PM CST -----  Hello! Would like to schedule this patient for laparoscopic hyst with bilateral salpingectomy, right ovarian cystectomy for abnormal bleeding and right hemorrhagic cyst. Will need 1 week preop and 2 week post op, do not need MD assist can do with josefa or chapito. Thanks!

## 2025-02-24 NOTE — ASSESSMENT & PLAN NOTE
-see HPI  -re-start lisinopril 20 mg, 1st week take half  -RTC 1 month  -consider add verapamil if BP stays hi and HA continues  -avoid NSAID's

## 2025-02-25 ENCOUNTER — TELEPHONE (OUTPATIENT)
Dept: PREADMISSION TESTING | Facility: HOSPITAL | Age: 45
End: 2025-02-25
Payer: COMMERCIAL

## 2025-02-25 DIAGNOSIS — Z01.818 PRE-OP EVALUATION: Primary | ICD-10-CM

## 2025-02-25 DIAGNOSIS — I10 HYPERTENSION, ESSENTIAL: ICD-10-CM

## 2025-03-26 ENCOUNTER — OFFICE VISIT (OUTPATIENT)
Dept: OBSTETRICS AND GYNECOLOGY | Facility: CLINIC | Age: 45
End: 2025-03-26
Payer: COMMERCIAL

## 2025-03-26 ENCOUNTER — PATIENT MESSAGE (OUTPATIENT)
Dept: PREADMISSION TESTING | Facility: HOSPITAL | Age: 45
End: 2025-03-26

## 2025-03-26 ENCOUNTER — HOSPITAL ENCOUNTER (OUTPATIENT)
Dept: PREADMISSION TESTING | Facility: HOSPITAL | Age: 45
Discharge: HOME | End: 2025-03-26
Attending: NURSE PRACTITIONER
Payer: COMMERCIAL

## 2025-03-26 ENCOUNTER — ANESTHESIA EVENT (OUTPATIENT)
Dept: SURGERY | Facility: HOSPITAL | Age: 45
End: 2025-03-26
Payer: COMMERCIAL

## 2025-03-26 VITALS
DIASTOLIC BLOOD PRESSURE: 92 MMHG | SYSTOLIC BLOOD PRESSURE: 151 MMHG | BODY MASS INDEX: 30.52 KG/M2 | WEIGHT: 151.13 LBS

## 2025-03-26 DIAGNOSIS — N93.9 ABNORMAL UTERINE BLEEDING (AUB): ICD-10-CM

## 2025-03-26 DIAGNOSIS — G89.18 POST-OP PAIN: ICD-10-CM

## 2025-03-26 DIAGNOSIS — N83.201 RIGHT OVARIAN CYST: ICD-10-CM

## 2025-03-26 DIAGNOSIS — Z01.818 PREOP EXAMINATION: Primary | ICD-10-CM

## 2025-03-26 PROCEDURE — 99999 PR PBB SHADOW E&M-EST. PATIENT-LVL III: CPT | Mod: PBBFAC,,, | Performed by: STUDENT IN AN ORGANIZED HEALTH CARE EDUCATION/TRAINING PROGRAM

## 2025-03-26 PROCEDURE — 99499 UNLISTED E&M SERVICE: CPT | Mod: S$GLB,,, | Performed by: STUDENT IN AN ORGANIZED HEALTH CARE EDUCATION/TRAINING PROGRAM

## 2025-03-26 RX ORDER — DOCUSATE SODIUM 100 MG/1
100 CAPSULE, LIQUID FILLED ORAL DAILY PRN
Qty: 30 CAPSULE | Refills: 1 | Status: SHIPPED | OUTPATIENT
Start: 2025-03-26 | End: 2026-03-26

## 2025-03-26 RX ORDER — OXYCODONE AND ACETAMINOPHEN 5; 325 MG/1; MG/1
1 TABLET ORAL EVERY 4 HOURS PRN
Qty: 20 TABLET | Refills: 0 | Status: SHIPPED | OUTPATIENT
Start: 2025-03-26

## 2025-03-26 RX ORDER — ONDANSETRON 8 MG/1
8 TABLET, ORALLY DISINTEGRATING ORAL EVERY 8 HOURS PRN
OUTPATIENT
Start: 2025-03-26

## 2025-03-26 RX ORDER — ACETAMINOPHEN 325 MG/1
650 TABLET ORAL EVERY 4 HOURS PRN
OUTPATIENT
Start: 2025-03-26

## 2025-03-26 RX ORDER — DIPHENHYDRAMINE HCL 25 MG
25 CAPSULE ORAL EVERY 4 HOURS PRN
OUTPATIENT
Start: 2025-03-26

## 2025-03-26 RX ORDER — HYDROMORPHONE HYDROCHLORIDE 2 MG/ML
1 INJECTION, SOLUTION INTRAMUSCULAR; INTRAVENOUS; SUBCUTANEOUS EVERY 4 HOURS PRN
Refills: 0 | OUTPATIENT
Start: 2025-03-26

## 2025-03-26 RX ORDER — PROCHLORPERAZINE EDISYLATE 5 MG/ML
5 INJECTION INTRAMUSCULAR; INTRAVENOUS EVERY 6 HOURS PRN
OUTPATIENT
Start: 2025-03-26

## 2025-03-26 RX ORDER — KETOROLAC TROMETHAMINE 30 MG/ML
30 INJECTION, SOLUTION INTRAMUSCULAR; INTRAVENOUS
OUTPATIENT
Start: 2025-03-26 | End: 2025-03-27

## 2025-03-26 RX ORDER — DIPHENHYDRAMINE HYDROCHLORIDE 50 MG/ML
25 INJECTION, SOLUTION INTRAMUSCULAR; INTRAVENOUS EVERY 4 HOURS PRN
OUTPATIENT
Start: 2025-03-26

## 2025-03-26 RX ORDER — IBUPROFEN 800 MG/1
800 TABLET ORAL EVERY 8 HOURS PRN
Qty: 60 TABLET | Refills: 2 | Status: SHIPPED | OUTPATIENT
Start: 2025-03-26 | End: 2026-03-26

## 2025-03-26 RX ORDER — ONDANSETRON 4 MG/1
8 TABLET, ORALLY DISINTEGRATING ORAL EVERY 8 HOURS PRN
Qty: 15 TABLET | Refills: 0 | Status: SHIPPED | OUTPATIENT
Start: 2025-03-26

## 2025-03-26 RX ORDER — IBUPROFEN 400 MG/1
800 TABLET ORAL
OUTPATIENT
Start: 2025-03-27

## 2025-03-26 RX ORDER — SODIUM CHLORIDE, SODIUM LACTATE, POTASSIUM CHLORIDE, CALCIUM CHLORIDE 600; 310; 30; 20 MG/100ML; MG/100ML; MG/100ML; MG/100ML
INJECTION, SOLUTION INTRAVENOUS CONTINUOUS
OUTPATIENT
Start: 2025-03-26

## 2025-03-26 RX ORDER — POLYETHYLENE GLYCOL 3350 17 G/17G
17 POWDER, FOR SOLUTION ORAL DAILY
OUTPATIENT
Start: 2025-03-26

## 2025-03-26 RX ORDER — HYDROCODONE BITARTRATE AND ACETAMINOPHEN 5; 325 MG/1; MG/1
1 TABLET ORAL EVERY 4 HOURS PRN
Refills: 0 | OUTPATIENT
Start: 2025-03-26

## 2025-03-26 RX ORDER — AMOXICILLIN 250 MG
1 CAPSULE ORAL 2 TIMES DAILY
OUTPATIENT
Start: 2025-03-26

## 2025-03-26 RX ORDER — HYDROCODONE BITARTRATE AND ACETAMINOPHEN 10; 325 MG/1; MG/1
1 TABLET ORAL EVERY 4 HOURS PRN
Refills: 0 | OUTPATIENT
Start: 2025-03-26

## 2025-03-26 NOTE — DISCHARGE INSTRUCTIONS
Your surgery is scheduled for 4/3/25.    Please report to [facility] on the 1st Floor at 0530 a.m.    THIS TIME IS SUBJECT TO CHANGE.  YOU WILL RECEIVE A PHONE CALL THE DAY BEFORE SURGERY BY 3:30 PM TO CONFIRM YOUR TIME OF ARRIVAL.  IF YOU HAVE NOT RECEIVED A PHONE CALL BY 3:30 PM THE DAY BEFORE YOUR SURGERY PLEASE CALL [phone]     INSTRUCTIONS IMPORTANT!!!  ¨Stop full meals 8 hours prior to arrival, but you can consume clear liquids up to 2 hours prior to arrival time. Clear liquids include Gatorade, water, soda, black coffee or tea (no milk or creamer), and clear juices only.        Please take Lisinopril and Cymbalta the morning of surgery.           ____  Do not wear makeup, including mascara.  ____  No powder, lotions or creams to surgical area.  ____  Please remove all jewelry, including piercings and leave at home.  ____  No money or valuables needed. Please leave at home.  ____  Please bring any documents given by your doctor.  ____  If going home the same day, arrange for a ride home. You will not be able to             drive if Anesthesia was used.  ____  Children under 18 years require a parent / guardian present the entire time             they are in surgery / recovery.  ____  Wear loose fitting clothing. Allow for dressings, bandages.  ____  Stop Aspirin and blood thinners as directed by prescribing provider.  ____  Do not take any herbal, vitamins, and or supplements 2 weeks prior to surgery.  ____  Stop NSAIDS (Naproxen, Aleve, Voltaren, Celebrex, Melxoicam), Goody's, and BC powder 7 days prior to surgery.  ____  Wash the surgical area with Hibiclens or Dial Antibacterial bar soap the night before surgery, and again the             morning of surgery.  Be sure to rinse hibiclens or Dial Antibacterial bar soap off completely (if instructed by   nurse).  ____  If you take diabetic medication including Metformin, Glimepiride, Glipizide, Glyburide, Byetta, Januvia, Actos, do not take am  of surgery unless            instructed by Doctor.  ____  Call MD for temperature above 101 degrees or any other signs of infection such as Urinary (bladder) infection, Upper respiratory infection, skin boils,             etc.  ____ Do Not wear your contact lenses the day of your procedure.  You may wear your glasses.      ____ Do not shave surgical site for 3 days prior to surgery.  ____ Practice Good hand washing before, during, and after procedure.  ____ Hold the following medication for 3 days prior to surgery:    Invokana (Canagliflozin)     Synjardy XR (jardiance + metformin)  Farxiga (Dapagliflozin)     Segluroment (steglarto + metformin)  Jardiance (Empagliflozin)     Qtern (farxiga + saxagliptin)   Steglatro (Ertugliflozin)     Glyxambi (jardiance + linagliptin)  Benzavvy (Bexagliflozin)     Steglujan (steglarto + sitagliptin)  Inpefa (Sotagliflozin)      Xigduo (farxiga + metformin)   Invokamet/Invokamet XR (invokana + Metformin)       I have read or had read and explained to me, and understand the above information.  Additional comments or instructions:  For additional questions call [phone]      ANESTHESIA SIDE EFFECTS  -For the first 24 hours after surgery:  Do not drive, use heavy equipment, make important decisions, or drink alcohol  -It is normal to feel sleepy for several hours.  Rest until you are more awake.  -Have someone stay with you, if needed.  They can watch for problems and help keep you safe.  -Some possible post anesthesia side effects include: nausea and vomiting, sore throat and hoarseness, sleepiness, and dizziness.        Pre-Op Bathing Instructions    Before surgery, you can play an important role in your own health.    Because skin is not sterile, we need to be sure that your skin is as free of germs as possible. By following the instructions below, you can reduce the number of germs on your skin before surgery.    IMPORTANT: You will need to shower with a special soap called  Hibiclens*, available at any pharmacy.  If you are allergic to Chlorhexidine (the antiseptic in Hibiclens), use an antibacterial soap such as Dial Soap for your preoperative shower.  You will shower with Hibiclens both the night before your surgery and the morning of your surgery.  Do not use Hibiclens on the head, face or genitals to avoid injury to those areas.    STEP #1: THE NIGHT BEFORE YOUR SURGERY     Do not shave the area of your body where your surgery will be performed.  Shower and wash your hair and body as usual with your normal soap and shampoo.  Rinse your hair and body thoroughly after you shower to remove all soap residue.  With your hand, apply one packet of Hibiclens soap to the surgical site.   Wash the site gently for five (5) minutes. Do not scrub your skin too hard.   Do not wash with your regular soap after Hibiclens is used.  Rinse your body thoroughly.  Pat yourself dry with a clean, soft towel.  Do not use lotion, cream, or powder.  Wear clean clothes.    STEP #2: THE MORNING OF YOUR SURGERY     Repeat Step #1.    * Not to be used by people allergic to Chlorhexidine.

## 2025-03-26 NOTE — ANESTHESIA PREPROCEDURE EVALUATION
2025  Amy Womack is a 45 y.o., female scheduled for HYSTERECTOMY, TOTAL, LAPAROSCOPIC and EXCISION, CYST, OVARY, LAPAROSCOPIC (Right) 4/3/25.    Past Medical History:   Diagnosis Date    Elevated blood pressure reading 2022    Hypertension     Ileus, unspecified 2022    IUD migration 2022    TOA (tubo-ovarian abscess) 2022     Past Surgical History:   Procedure Laterality Date    AUGMENTATION OF BREAST Bilateral     LIPOSUCTION       Review of patient's allergies indicates:   Allergen Reactions    Macadamia nut oil Hives, Itching, Palpitations and Swelling     Current Outpatient Medications   Medication Instructions    albuterol (PROVENTIL/VENTOLIN HFA) 90 mcg/actuation inhaler SMARTSI-2 Puff(s) By Mouth Every 4-6 Hours PRN    calcium carbonate (CALCIUM 600 ORAL) No dose, route, or frequency recorded.    DULoxetine (CYMBALTA) 20 mg, Oral, Daily    ferrous fumarate/vit Bcomp,C (SUPER B COMPLEX ORAL) No dose, route, or frequency recorded.    lisinopriL (PRINIVIL,ZESTRIL) 20 mg, Oral, Daily       Pre-op Assessment    I have reviewed the Patient Summary Reports.     I have reviewed the Nursing Notes. I have reviewed the NPO Status.   I have reviewed the Medications.     Review of Systems  Anesthesia Hx:  No problems with previous Anesthesia               Denies Personal Hx of Anesthesia complications.                    Social:  Former Smoker, No Alcohol Use       Cardiovascular:  Exercise tolerance: good   Denies Pacemaker. Hypertension       Denies Angina.     hyperlipidemia              Deep Venous Thrombosis (DVT) (LLE related to tubo-ovarian abscess, .), Hx of DVT, left lower extremity                  Pulmonary:  Pulmonary Normal      Denies Shortness of breath.                  Neurological:  Denies TIA.  Denies CVA.    Denies Headaches. Denies Seizures.                                 Endocrine:  Denies Diabetes.         Obesity / BMI > 30  Psych:    depression                Physical Exam  General: Cooperative and Oriented    Airway:  Mallampati: III / II  Mouth Opening: Normal  TM Distance: Normal  Tongue: Normal  Neck ROM: Normal ROM    Dental:  Intact  Any loose or missing teeth verified with patient  Chest/Lungs:  Normal Respiratory Rate    Heart:  Rate: Normal      Lab Results   Component Value Date    WBC 8.53 01/09/2025    HGB 12.0 01/09/2025    HCT 39.7 01/09/2025     01/09/2025    CHOL 231 (H) 01/09/2025    TRIG 81 01/09/2025    HDL 67 01/09/2025    ALT 13 01/09/2025    AST 17 01/09/2025     01/09/2025    K 4.2 01/09/2025     01/09/2025    CREATININE 0.8 01/09/2025    BUN 14 01/09/2025    CO2 23 01/09/2025    TSH 0.713 01/09/2025    INR 1.0 12/14/2022    HGBA1C 5.2 01/09/2025     Results for orders placed or performed during the hospital encounter of 12/14/22   EKG 12-lead    Collection Time: 12/14/22 10:13 AM    Narrative    Test Reason : R06.02,    Vent. Rate : 104 BPM     Atrial Rate : 104 BPM     P-R Int : 148 ms          QRS Dur : 072 ms      QT Int : 342 ms       P-R-T Axes : 051 007 078 degrees     QTc Int : 449 ms    Sinus tachycardia  Anteroseptal infarct ,age undetermined  Anterolateral Infarct - Age inderterminate, or probably old  Nonspecific ST and/or T wave abnormalities (<1mm ST elevation in inferior  leads)  Abnormal ECG  No previous ECGs available  Confirmed by Freddy Chun MD ([id number]) on 12/14/2022 1:38:58 PM    Referred By:             Confirmed By:Freddy Chun MD         Anesthesia Plan  Type of Anesthesia, risks & benefits discussed:    Anesthesia Type: Gen ETT  Intra-op Monitoring Plan: Standard ASA Monitors  Post Op Pain Control Plan: multimodal analgesia  Induction:  IV  Airway Plan: Direct, Post-Induction  Informed Consent: Informed consent signed with the Patient and all parties understand the risks and agree with  anesthesia plan.  All questions answered.   ASA Score: 2  Day of Surgery Review of History & Physical: H&P Update referred to the surgeon/provider.    Ready For Surgery From Anesthesia Perspective.     .

## 2025-03-26 NOTE — PRE-PROCEDURE INSTRUCTIONS
Friend.    Allergies, medical, surgical, family and psychosocial histories reviewed with patient. Periop plan of care reviewed. Preop instructions given, including medications to take and to hold. Hibiclens soap and instructions on use given. Time allotted for questions to be addressed.      Arrival time 0530.    Please take Lisinopril and Cymbalta the morning of surgery.     Surgery instructions reviewed and surgery booklet sent or emailed to the patient via the portal.

## 2025-03-26 NOTE — PROGRESS NOTES
CC: Pre-op Exam    HPI:  Amy Womack is a 45 y.o. female  presents for pre op visit. Patient is scheduled for TLH with cystectomy of right hemorrhagic cyst/ovary on 4/3/25    Patient feels well today, has no complaints.     ROS:  GENERAL: No fever, chills, fatigability or weight loss.  VULVAR: No pain, no lesions and no itching.  VAGINAL: No relaxation, no itching, no discharge, no abnormal bleeding and no lesions.  ABDOMEN: No abdominal pain. Denies nausea. Denies vomiting. No diarrhea. No constipation  BREAST: Denies pain. No lumps. No discharge.  URINARY: No incontinence, no nocturia, no frequency and no dysuria.  CARDIOVASCULAR: No chest pain. No shortness of breath. No leg cramps.  NEUROLOGICAL: No headaches. No vision changes.      Patient History:  Past Medical History:   Diagnosis Date    Elevated blood pressure reading 2022    Hypertension     Ileus, unspecified 2022    IUD migration 2022    TOA (tubo-ovarian abscess) 2022     Past Surgical History:   Procedure Laterality Date    AUGMENTATION OF BREAST Bilateral     LIPOSUCTION       Social History[1]  Family History   Problem Relation Name Age of Onset    Hypertension Mother      Stroke Father      Atrial fibrillation Father      Hypertension Father      Hypertension Maternal Grandmother      Stroke Maternal Grandfather      Hypertension Maternal Grandfather      Heart attack Paternal Grandmother      Hypertension Paternal Grandmother      Atrial fibrillation Paternal Grandmother      Heart attack Paternal Grandfather       OB History    Para Term  AB Living   2 1 1 0 1 1   SAB IAB Ectopic Multiple Live Births   0 0 1 0 1      # Outcome Date GA Lbr Eladio/2nd Weight Sex Type Anes PTL Lv   2 Term     M Vag-Spont   CHAYO   1 Ectopic               Birth Comments: treated with MTX       Objective:   BP (!) 151/92   Wt 68.5 kg (151 lb 1.6 oz)   LMP 2025 (Approximate)   BMI 30.52 kg/m²   Patient's last menstrual  period was 03/06/2025 (approximate).      PHYSICAL EXAM:  APPEARANCE: Well nourished, well developed, in no acute distress.  AFFECT: WNL, alert and oriented x 3  SKIN: No acne or hirsutism  CHEST: Good respiratory effect  ABDOMEN: Soft.  No tenderness or masses.  No hepatosplenomegaly.  No hernias.  PELVIC: deferred  EXTREMITIES: No edema.      ASSESSMENT and PLAN:    ICD-10-CM ICD-9-CM    1. Preop examination  Z01.818 V72.84       2. Abnormal uterine bleeding (AUB)  N93.9 626.9       3. Right ovarian cyst  N83.201 620.2       4. Post-op pain  G89.18 338.18 docusate sodium (COLACE) 100 MG capsule      ibuprofen (ADVIL,MOTRIN) 800 MG tablet      ondansetron (ZOFRAN-ODT) 4 MG TbDL      oxyCODONE-acetaminophen (PERCOCET) 5-325 mg per tablet          Preop visit   - scheduled for [facility] with cystectomy of right hemorrhagic cyst/ovary on 4/3/25  - R/B/A of the various treatment options have been discussed and the patient has decided to proceed with surgical intervention. We discussed the various risk associated with gynecologic surgical procedures, including but not limited to, infection, bleeding, anesthetic complications, venous thromboembolism, and cardiovascular events. We discussed the possible need for blood transfusion and the risk of associated complications, including blood born infections. We discussed the risk of major vessel injury, gastrointestinal, urologic, and neurologic complications. We discussed the risk of bowel injury. The expected post-operative course was discussed and all the patient's questions were answered.   - procedure consent forms and blood transfusion consent forms signed, scanned into chart  - post op scripts ordered today for patient to  prior to surgery   - patient counseled NPO after midnight day prior to surgery, present at least 2 hours prior to scheduled time, preop will call patient for additional instructions   - patient instructed to call office or send message if she has any  additional questions or concerns prior to planned procedure       Follow up: post op visit as scheduled       Stephanie Heaney, MD OBGYN [facility]            [1]   Social History  Tobacco Use    Smoking status: Never    Smokeless tobacco: Never   Substance Use Topics    Alcohol use: Yes     Comment: occ    Drug use: Never

## 2025-03-26 NOTE — H&P (VIEW-ONLY)
CC: Pre-op Exam    HPI:  Amy Womack is a 45 y.o. female  presents for pre op visit. Patient is scheduled for [facility] with cystectomy of right hemorrhagic cyst/ovary on 4/3/25    Patient feels well today, has no complaints.     ROS:  GENERAL: No fever, chills, fatigability or weight loss.  VULVAR: No pain, no lesions and no itching.  VAGINAL: No relaxation, no itching, no discharge, no abnormal bleeding and no lesions.  ABDOMEN: No abdominal pain. Denies nausea. Denies vomiting. No diarrhea. No constipation  BREAST: Denies pain. No lumps. No discharge.  URINARY: No incontinence, no nocturia, no frequency and no dysuria.  CARDIOVASCULAR: No chest pain. No shortness of breath. No leg cramps.  NEUROLOGICAL: No headaches. No vision changes.      Patient History:  Past Medical History:   Diagnosis Date    Elevated blood pressure reading 2022    Hypertension     Ileus, unspecified 2022    IUD migration 2022    TOA (tubo-ovarian abscess) 2022     Past Surgical History:   Procedure Laterality Date    AUGMENTATION OF BREAST Bilateral     LIPOSUCTION       Social History[1]  Family History   Problem Relation Name Age of Onset    Hypertension Mother      Stroke Father      Atrial fibrillation Father      Hypertension Father      Hypertension Maternal Grandmother      Stroke Maternal Grandfather      Hypertension Maternal Grandfather      Heart attack Paternal Grandmother      Hypertension Paternal Grandmother      Atrial fibrillation Paternal Grandmother      Heart attack Paternal Grandfather       OB History    Para Term  AB Living   2 1 1 0 1 1   SAB IAB Ectopic Multiple Live Births   0 0 1 0 1      # Outcome Date GA Lbr Eladio/2nd Weight Sex Type Anes PTL Lv   2 Term     M Vag-Spont   CHAYO   1 Ectopic               Birth Comments: treated with MTX       Objective:   BP (!) 151/92   Wt 68.5 kg (151 lb 1.6 oz)   LMP 2025 (Approximate)   BMI 30.52 kg/m²   Patient's last menstrual  period was 03/06/2025 (approximate).      PHYSICAL EXAM:  APPEARANCE: Well nourished, well developed, in no acute distress.  AFFECT: WNL, alert and oriented x 3  SKIN: No acne or hirsutism  CHEST: Good respiratory effect  ABDOMEN: Soft.  No tenderness or masses.  No hepatosplenomegaly.  No hernias.  PELVIC: deferred  EXTREMITIES: No edema.      ASSESSMENT and PLAN:    ICD-10-CM ICD-9-CM    1. Preop examination  Z01.818 V72.84       2. Abnormal uterine bleeding (AUB)  N93.9 626.9       3. Right ovarian cyst  N83.201 620.2       4. Post-op pain  G89.18 338.18 docusate sodium (COLACE) 100 MG capsule      ibuprofen (ADVIL,MOTRIN) 800 MG tablet      ondansetron (ZOFRAN-ODT) 4 MG TbDL      oxyCODONE-acetaminophen (PERCOCET) 5-325 mg per tablet          Preop visit   - scheduled for [facility] with cystectomy of right hemorrhagic cyst/ovary on 4/3/25  - R/B/A of the various treatment options have been discussed and the patient has decided to proceed with surgical intervention. We discussed the various risk associated with gynecologic surgical procedures, including but not limited to, infection, bleeding, anesthetic complications, venous thromboembolism, and cardiovascular events. We discussed the possible need for blood transfusion and the risk of associated complications, including blood born infections. We discussed the risk of major vessel injury, gastrointestinal, urologic, and neurologic complications. We discussed the risk of bowel injury. The expected post-operative course was discussed and all the patient's questions were answered.   - procedure consent forms and blood transfusion consent forms signed, scanned into chart  - post op scripts ordered today for patient to  prior to surgery   - patient counseled NPO after midnight day prior to surgery, present at least 2 hours prior to scheduled time, preop will call patient for additional instructions   - patient instructed to call office or send message if she has any  additional questions or concerns prior to planned procedure       Follow up: post op visit as scheduled       Stephanie Heaney, MD OBGYN [facility]            [1]   Social History  Tobacco Use    Smoking status: Never    Smokeless tobacco: Never   Substance Use Topics    Alcohol use: Yes     Comment: occ    Drug use: Never

## 2025-03-31 ENCOUNTER — TELEPHONE (OUTPATIENT)
Facility: CLINIC | Age: 45
End: 2025-03-31
Payer: COMMERCIAL

## 2025-03-31 ENCOUNTER — RESULTS FOLLOW-UP (OUTPATIENT)
Dept: ANESTHESIOLOGY | Facility: HOSPITAL | Age: 45
End: 2025-03-31

## 2025-03-31 ENCOUNTER — HOSPITAL ENCOUNTER (OUTPATIENT)
Dept: CARDIOLOGY | Facility: CLINIC | Age: 45
Discharge: HOME | End: 2025-03-31
Payer: COMMERCIAL

## 2025-03-31 ENCOUNTER — OFFICE VISIT (OUTPATIENT)
Dept: INTERNAL MEDICINE | Facility: CLINIC | Age: 45
End: 2025-03-31
Attending: FAMILY MEDICINE
Payer: COMMERCIAL

## 2025-03-31 ENCOUNTER — RESULTS FOLLOW-UP (OUTPATIENT)
Dept: INTERNAL MEDICINE | Facility: CLINIC | Age: 45
End: 2025-03-31

## 2025-03-31 ENCOUNTER — LAB VISIT (OUTPATIENT)
Dept: LAB | Facility: HOSPITAL | Age: 45
End: 2025-03-31
Attending: FAMILY MEDICINE
Payer: COMMERCIAL

## 2025-03-31 VITALS
HEIGHT: 59 IN | DIASTOLIC BLOOD PRESSURE: 80 MMHG | OXYGEN SATURATION: 98 % | HEART RATE: 91 BPM | BODY MASS INDEX: 31.51 KG/M2 | WEIGHT: 156.31 LBS | SYSTOLIC BLOOD PRESSURE: 132 MMHG

## 2025-03-31 DIAGNOSIS — Z01.818 PRE-OP EVALUATION: ICD-10-CM

## 2025-03-31 DIAGNOSIS — F32.9 MAJOR DEPRESSIVE DISORDER WITH CURRENT ACTIVE EPISODE, UNSPECIFIED DEPRESSION EPISODE SEVERITY, UNSPECIFIED WHETHER RECURRENT: ICD-10-CM

## 2025-03-31 DIAGNOSIS — I10 HYPERTENSION, ESSENTIAL: ICD-10-CM

## 2025-03-31 DIAGNOSIS — I10 HYPERTENSION, ESSENTIAL: Primary | ICD-10-CM

## 2025-03-31 DIAGNOSIS — R51.9 NONINTRACTABLE HEADACHE, UNSPECIFIED CHRONICITY PATTERN, UNSPECIFIED HEADACHE TYPE: ICD-10-CM

## 2025-03-31 DIAGNOSIS — Z01.818 PREOPERATIVE CLEARANCE: ICD-10-CM

## 2025-03-31 LAB
ANION GAP (OHS): 7 MMOL/L (ref 8–16)
BUN SERPL-MCNC: 13 MG/DL (ref 6–20)
CALCIUM SERPL-MCNC: 8.8 MG/DL (ref 8.7–10.5)
CHLORIDE SERPL-SCNC: 109 MMOL/L (ref 95–110)
CO2 SERPL-SCNC: 22 MMOL/L (ref 23–29)
CREAT SERPL-MCNC: 0.7 MG/DL (ref 0.5–1.4)
GFR SERPLBLD CREATININE-BSD FMLA CKD-EPI: >60 ML/MIN/1.73/M2
GLUCOSE SERPL-MCNC: 93 MG/DL (ref 70–110)
OHS QRS DURATION: 82 MS
OHS QTC CALCULATION: 421 MS
POTASSIUM SERPL-SCNC: 3.9 MMOL/L (ref 3.5–5.1)
SODIUM SERPL-SCNC: 138 MMOL/L (ref 136–145)

## 2025-03-31 PROCEDURE — 82435 ASSAY OF BLOOD CHLORIDE: CPT

## 2025-03-31 PROCEDURE — 36415 COLL VENOUS BLD VENIPUNCTURE: CPT

## 2025-03-31 PROCEDURE — 99999 PR PBB SHADOW E&M-EST. PATIENT-LVL V: CPT | Mod: PBBFAC,,, | Performed by: FAMILY MEDICINE

## 2025-03-31 PROCEDURE — 93010 ELECTROCARDIOGRAM REPORT: CPT | Mod: S$GLB,,, | Performed by: STUDENT IN AN ORGANIZED HEALTH CARE EDUCATION/TRAINING PROGRAM

## 2025-03-31 RX ORDER — VERAPAMIL HYDROCHLORIDE 40 MG/1
40 TABLET ORAL 2 TIMES DAILY
Qty: 60 TABLET | Refills: 2 | Status: SHIPPED | OUTPATIENT
Start: 2025-03-31

## 2025-03-31 NOTE — TELEPHONE ENCOUNTER
I informed pt she does not have to complete her type and screen now she can complete it the day of the surgery.

## 2025-03-31 NOTE — PROGRESS NOTES
Subjective:       Patient ID: Amy Womack is a 45 y.o. female.    Chief Complaint: Follow-up    Established patient follows up for management of chronic medical illnesses with complaints today. Please see dictation and ROS for interval problems, specific complaints and disease management discussion.    Past, Surgical, Family, Social, Histories; Medications, allergies reviewed and reconciled.  Health maintenance file reviewed and addressed items due. Recent applicable lab, imaging and cardiovascular results reviewed.  Problem list items reviewed and modified or added entries (in the overview section) may not be transcribed into this encounter note due to note writer format.      Blood pressure follow-up.  Was started on lisinopril last visit.  No difficulties reported.  Initial blood pressure by nurse was elevated today.  Subsequent 132/80 right by me, 132/85 left.  Persisting headaches.  Lasting a day or 2.  Resolving for a day or 2.  Been worsening.    Informs me she is having a hysterectomy later this week.  She had a preoperative EKG which was normal sinus rhythm.    Not currently exercising but had previously run significant differences without difficulty.  No current chest pain, dyspnea, edema reported.        Review of Systems   Constitutional:  Negative for appetite change, chills, diaphoresis, fatigue and fever.   HENT:  Negative for congestion, postnasal drip, rhinorrhea, sore throat and trouble swallowing.    Eyes:  Negative for visual disturbance.   Respiratory:  Negative for cough, choking, chest tightness, shortness of breath and wheezing.    Cardiovascular:  Negative for chest pain and leg swelling.   Gastrointestinal:  Negative for abdominal distention, abdominal pain, diarrhea, nausea and vomiting.   Genitourinary:  Negative for difficulty urinating and hematuria.   Musculoskeletal:  Negative for arthralgias and myalgias.   Skin:  Negative for rash.   Neurological:  Positive for headaches. Negative  for weakness and light-headedness.   Hematological:  Does not bruise/bleed easily.   Psychiatric/Behavioral:  Negative for decreased concentration and dysphoric mood.        Objective:      Physical Exam  Vitals and nursing note reviewed.   Constitutional:       General: She is not in acute distress.     Appearance: She is well-developed.   Pulmonary:      Effort: Pulmonary effort is normal.   Musculoskeletal:      Cervical back: Neck supple.      Right lower leg: No edema.      Left lower leg: No edema.   Skin:     General: Skin is warm and dry.      Findings: No rash.   Neurological:      Mental Status: She is alert and oriented to person, place, and time.   Psychiatric:         Behavior: Behavior normal.         Thought Content: Thought content normal.         Judgment: Judgment normal.         Assessment:       1. Hypertension, essential    2. Nonintractable headache, unspecified chronicity pattern, unspecified headache type    3. Major depressive disorder with current active episode, unspecified depression episode severity, unspecified whether recurrent    4. Preoperative clearance        Plan:     Medication List with Changes/Refills   New Medications    VERAPAMIL (CALAN) 40 MG TAB    Take 1 tablet (40 mg total) by mouth 2 (two) times daily.   Current Medications    ALBUTEROL (PROVENTIL/VENTOLIN HFA) 90 MCG/ACTUATION INHALER    SMARTSI-2 Puff(s) By Mouth Every 4-6 Hours PRN    CALCIUM CARBONATE (CALCIUM 600 ORAL)        DOCUSATE SODIUM (COLACE) 100 MG CAPSULE    Take 1 capsule (100 mg total) by mouth daily as needed for Constipation.    DULOXETINE (CYMBALTA) 20 MG CAPSULE    Take 1 capsule (20 mg total) by mouth once daily.    FERROUS FUMARATE/VIT BCOMP,C (SUPER B COMPLEX ORAL)        IBUPROFEN (ADVIL,MOTRIN) 800 MG TABLET    Take 1 tablet (800 mg total) by mouth every 8 (eight) hours as needed for Pain.    LISINOPRIL (PRINIVIL,ZESTRIL) 20 MG TABLET    Take 1 tablet (20 mg total) by mouth once daily.     ONDANSETRON (ZOFRAN-ODT) 4 MG TBDL    Take 2 tablets (8 mg total) by mouth every 8 (eight) hours as needed (nausea).    OXYCODONE-ACETAMINOPHEN (PERCOCET) 5-325 MG PER TABLET    Take 1 tablet by mouth every 4 (four) hours as needed for Pain.     1. Hypertension, essential  Assessment & Plan:  -cont lisin  -add verapamil  -RTC 1 month    -awaiting laboratory concerning renal function and potassium, jomar today, pending    Orders:  -     Hypertension Digital Medicine (HDMP) Enrollment Order  -     verapamiL (CALAN) 40 MG Tab; Take 1 tablet (40 mg total) by mouth 2 (two) times daily.  Dispense: 60 tablet; Refill: 2    2. Nonintractable headache, unspecified chronicity pattern, unspecified headache type  Assessment & Plan:  -recommend hydration and OTC magnesium oxide  -add verapamil for antihypertensive    Orders:  -     Ambulatory referral/consult to Neurology; Future; Expected date: 04/07/2025    3. Major depressive disorder with current active episode, unspecified depression episode severity, unspecified whether recurrent  Assessment & Plan:  -restarted on duloxetine in January with significant improvement.  No current complaints.      4. Preoperative clearance      See meds, orders, follow up, routing and instructions sections of encounter and AVS. Discussed with patient and provided on AVS.    Lab Results   Component Value Date     01/09/2025    K 4.2 01/09/2025     01/09/2025    BUN 14 01/09/2025    CREATININE 0.8 01/09/2025    GLU 95 01/09/2025    HGBA1C 5.2 01/09/2025    MG 1.6 12/04/2022    AST 17 01/09/2025    ALT 13 01/09/2025    ALBUMIN 4.2 01/09/2025    PROT 7.7 01/09/2025    BILITOT 0.2 01/09/2025    CHOL 231 (H) 01/09/2025    HDL 67 01/09/2025    LDLCALC 147.8 01/09/2025    TRIG 81 01/09/2025    WBC 8.53 01/09/2025    HGB 12.0 01/09/2025    HCT 39.7 01/09/2025     01/09/2025    TSH 0.713 01/09/2025    BNP 14 12/14/2022         Surgical Risk Assessment     Active cardiac issues:  Active  decompensated heart failure? No   Unstable angina?  No   Significant uncontrolled arrhythmias? No   Severe valvular heart disease-Aortic or Mitral Stenosis? No   Recent MI or coronary revascularization < 30 days? No     Clinical risk factors predicting perioperative major adverse cardiac events per RCRI  High risk surgery (suprainguinal vascular, intraperitoneal, or intrathoracic surgery)? No   History of CAD/ischemic heart disease? No   History of cerebrovascular disease (CVA or TIA)? No   History of compensated heart failure? No   Type 2 diabetes requiring insulin? No   Serum Creatinine > 2? No   Total cardiac risk factors 0     0 predictors = 3.9%, 1 predictor = 6.0%, 2 predictors = 10.1%, >=3 predictors = >15%    According to the revised cardiac risk index, the risk of wesley-procedural major cardiac complications (cardiac death, nonfatal MI, nonfatal cardiac arrest, postoperative cardiogenic pulmonary edema, complete heart block) is: 0 .     From [employer]ili[employer] 2017, based on pooled data from 5 high quality external validations (4 prospective). These numbers are higher than those often quoted from the now-outdated original study (Ramana 1999).    If patient has a low risk of MACE (<1%), proceed to surgery. If the patient is at elevated risk of MACE, then determine functional capacity (pt reported activity or DASI model).     If the patient has moderate, good, or excellent functional capacity (>=4 METs), then proceed to surgery without further evaluation. If patient has poor or unknown functional capacity, will further testing impact decision making or perioperative care? If yes, then pharmacological stress testing is appropriate. In those patients with unknown functional capacity, exercise stress testing may be reasonable to perform.     Patient's functional mets: 6-8+    < 4 METs -unable to walk > 2 blocks on level ground without stopping due to symptoms  - eating, dressing, toileting, walking indoors, light housework.  POOR   > 4 METs -climbing > 1 flight of stairs without stopping  -walking up hill > 1-2 blocks  -scrubbing floors  -moving furniture  - golf, bowling, dancing or tennis  -running short distance MODERATE to EXCELLENT     OR   [email or web address]    Currently working for BP control. Verapaminl added today. Office readings today were normal with large cuff. Cannot guarantee near term readings will continue to be at or below goal.    Lap Hys scheduled for this week.    No contraindications to surgery, but if pressure elevated on preop readings, may have to delay until control established.

## 2025-03-31 NOTE — ASSESSMENT & PLAN NOTE
-cont lisin  -add verapamil  -RTC 1 month    -awaiting laboratory concerning renal function and potassium, jomar today, pending

## 2025-03-31 NOTE — TELEPHONE ENCOUNTER
Pt would like to know if she needs her type and screen blood work now or would it be drawn the day of surgery.

## 2025-04-03 ENCOUNTER — ANESTHESIA (OUTPATIENT)
Dept: SURGERY | Facility: HOSPITAL | Age: 45
End: 2025-04-03
Payer: COMMERCIAL

## 2025-04-03 ENCOUNTER — HOSPITAL ENCOUNTER (OUTPATIENT)
Facility: HOSPITAL | Age: 45
Discharge: HOME | End: 2025-04-03
Attending: STUDENT IN AN ORGANIZED HEALTH CARE EDUCATION/TRAINING PROGRAM | Admitting: STUDENT IN AN ORGANIZED HEALTH CARE EDUCATION/TRAINING PROGRAM
Payer: COMMERCIAL

## 2025-04-03 ENCOUNTER — DOCUMENTATION ONLY (OUTPATIENT)
Dept: OBSTETRICS AND GYNECOLOGY | Facility: CLINIC | Age: 45
End: 2025-04-03
Payer: COMMERCIAL

## 2025-04-03 VITALS
WEIGHT: 152 LBS | HEART RATE: 85 BPM | RESPIRATION RATE: 16 BRPM | TEMPERATURE: 98 F | OXYGEN SATURATION: 98 % | HEIGHT: 59 IN | SYSTOLIC BLOOD PRESSURE: 128 MMHG | DIASTOLIC BLOOD PRESSURE: 68 MMHG | BODY MASS INDEX: 30.64 KG/M2

## 2025-04-03 DIAGNOSIS — N93.9 ABNORMAL UTERINE BLEEDING (AUB): Primary | ICD-10-CM

## 2025-04-03 DIAGNOSIS — N92.6 IRREGULAR PERIODS/MENSTRUAL CYCLES: ICD-10-CM

## 2025-04-03 DIAGNOSIS — N83.201 RIGHT OVARIAN CYST: ICD-10-CM

## 2025-04-03 LAB
ABORH RETYPE: NORMAL
ABSOLUTE EOSINOPHIL (OHS): 0.11 K/UL
ABSOLUTE MONOCYTE (OHS): 0.78 K/UL (ref 0.3–1)
ABSOLUTE NEUTROPHIL COUNT (OHS): 3.68 K/UL (ref 1.8–7.7)
B-HCG UR QL: NEGATIVE
BASOPHILS # BLD AUTO: 0.04 K/UL
BASOPHILS NFR BLD AUTO: 0.6 %
CTP QC/QA: YES
ERYTHROCYTE [DISTWIDTH] IN BLOOD BY AUTOMATED COUNT: 15.6 % (ref 11.5–14.5)
HCT VFR BLD AUTO: 39.6 % (ref 37–48.5)
HGB BLD-MCNC: 12.7 GM/DL (ref 12–16)
IMM GRANULOCYTES # BLD AUTO: 0.03 K/UL (ref 0–0.04)
IMM GRANULOCYTES NFR BLD AUTO: 0.4 % (ref 0–0.5)
INDIRECT COOMBS: NORMAL
LYMPHOCYTES # BLD AUTO: 2.04 K/UL (ref 1–4.8)
MCH RBC QN AUTO: 25.6 PG (ref 27–31)
MCHC RBC AUTO-ENTMCNC: 32.1 G/DL (ref 32–36)
MCV RBC AUTO: 80 FL (ref 82–98)
NUCLEATED RBC (/100WBC) (OHS): 0 /100 WBC
PLATELET # BLD AUTO: 263 K/UL (ref 150–450)
PMV BLD AUTO: 10.5 FL (ref 9.2–12.9)
POCT GLUCOSE: 97 MG/DL (ref 70–110)
RBC # BLD AUTO: 4.97 M/UL (ref 4–5.4)
RELATIVE EOSINOPHIL (OHS): 1.6 %
RELATIVE LYMPHOCYTE (OHS): 30.5 % (ref 18–48)
RELATIVE MONOCYTE (OHS): 11.7 % (ref 4–15)
RELATIVE NEUTROPHIL (OHS): 55.2 % (ref 38–73)
RH BLD: NORMAL
SPECIMEN OUTDATE: NORMAL
WBC # BLD AUTO: 6.68 K/UL (ref 3.9–12.7)

## 2025-04-03 PROCEDURE — 36415 COLL VENOUS BLD VENIPUNCTURE: CPT | Performed by: STUDENT IN AN ORGANIZED HEALTH CARE EDUCATION/TRAINING PROGRAM

## 2025-04-03 PROCEDURE — 37000008 HC ANESTHESIA 1ST 15 MINUTES: Performed by: STUDENT IN AN ORGANIZED HEALTH CARE EDUCATION/TRAINING PROGRAM

## 2025-04-03 PROCEDURE — 71000039 HC RECOVERY, EACH ADD'L HOUR: Performed by: STUDENT IN AN ORGANIZED HEALTH CARE EDUCATION/TRAINING PROGRAM

## 2025-04-03 PROCEDURE — 71000015 HC POSTOP RECOV 1ST HR: Performed by: STUDENT IN AN ORGANIZED HEALTH CARE EDUCATION/TRAINING PROGRAM

## 2025-04-03 PROCEDURE — 27800903 OPTIME MED/SURG SUP & DEVICES OTHER IMPLANTS: Performed by: STUDENT IN AN ORGANIZED HEALTH CARE EDUCATION/TRAINING PROGRAM

## 2025-04-03 PROCEDURE — 58571 TLH W/T/O 250 G OR LESS: CPT | Mod: ,,, | Performed by: STUDENT IN AN ORGANIZED HEALTH CARE EDUCATION/TRAINING PROGRAM

## 2025-04-03 PROCEDURE — 71000033 HC RECOVERY, INTIAL HOUR: Performed by: STUDENT IN AN ORGANIZED HEALTH CARE EDUCATION/TRAINING PROGRAM

## 2025-04-03 PROCEDURE — 81025 URINE PREGNANCY TEST: CPT | Performed by: STUDENT IN AN ORGANIZED HEALTH CARE EDUCATION/TRAINING PROGRAM

## 2025-04-03 PROCEDURE — 63600175 PHARM REV CODE 636 W HCPCS: Performed by: STUDENT IN AN ORGANIZED HEALTH CARE EDUCATION/TRAINING PROGRAM

## 2025-04-03 PROCEDURE — 37000009 HC ANESTHESIA EA ADD 15 MINS: Performed by: STUDENT IN AN ORGANIZED HEALTH CARE EDUCATION/TRAINING PROGRAM

## 2025-04-03 PROCEDURE — 25000003 PHARM REV CODE 250: Performed by: NURSE ANESTHETIST, CERTIFIED REGISTERED

## 2025-04-03 PROCEDURE — 88307 TISSUE EXAM BY PATHOLOGIST: CPT | Mod: TC | Performed by: STUDENT IN AN ORGANIZED HEALTH CARE EDUCATION/TRAINING PROGRAM

## 2025-04-03 PROCEDURE — 25000003 PHARM REV CODE 250: Performed by: STUDENT IN AN ORGANIZED HEALTH CARE EDUCATION/TRAINING PROGRAM

## 2025-04-03 PROCEDURE — 85025 COMPLETE CBC W/AUTO DIFF WBC: CPT | Performed by: STUDENT IN AN ORGANIZED HEALTH CARE EDUCATION/TRAINING PROGRAM

## 2025-04-03 PROCEDURE — 27201423 OPTIME MED/SURG SUP & DEVICES STERILE SUPPLY: Performed by: STUDENT IN AN ORGANIZED HEALTH CARE EDUCATION/TRAINING PROGRAM

## 2025-04-03 PROCEDURE — 63600175 PHARM REV CODE 636 W HCPCS: Mod: JZ,TB | Performed by: NURSE ANESTHETIST, CERTIFIED REGISTERED

## 2025-04-03 PROCEDURE — 86901 BLOOD TYPING SEROLOGIC RH(D): CPT | Performed by: STUDENT IN AN ORGANIZED HEALTH CARE EDUCATION/TRAINING PROGRAM

## 2025-04-03 PROCEDURE — 63600175 PHARM REV CODE 636 W HCPCS: Performed by: NURSE ANESTHETIST, CERTIFIED REGISTERED

## 2025-04-03 PROCEDURE — 71000016 HC POSTOP RECOV ADDL HR: Performed by: STUDENT IN AN ORGANIZED HEALTH CARE EDUCATION/TRAINING PROGRAM

## 2025-04-03 PROCEDURE — C2628 CATHETER, OCCLUSION: HCPCS | Performed by: STUDENT IN AN ORGANIZED HEALTH CARE EDUCATION/TRAINING PROGRAM

## 2025-04-03 PROCEDURE — 36000711: Performed by: STUDENT IN AN ORGANIZED HEALTH CARE EDUCATION/TRAINING PROGRAM

## 2025-04-03 PROCEDURE — 36000710: Performed by: STUDENT IN AN ORGANIZED HEALTH CARE EDUCATION/TRAINING PROGRAM

## 2025-04-03 DEVICE — PATCH AMNION DUAL LAYER 4X8CM: Type: IMPLANTABLE DEVICE | Site: ABDOMEN | Status: FUNCTIONAL

## 2025-04-03 RX ORDER — MUPIROCIN 20 MG/G
OINTMENT TOPICAL
Status: COMPLETED | OUTPATIENT
Start: 2025-04-03 | End: 2025-04-03

## 2025-04-03 RX ORDER — PREGABALIN 50 MG/1
50 CAPSULE ORAL
Status: COMPLETED | OUTPATIENT
Start: 2025-04-03 | End: 2025-04-03

## 2025-04-03 RX ORDER — VECURONIUM BROMIDE 1 MG/ML
INJECTION, POWDER, LYOPHILIZED, FOR SOLUTION INTRAVENOUS
Status: DISCONTINUED | OUTPATIENT
Start: 2025-04-03 | End: 2025-04-03

## 2025-04-03 RX ORDER — KETAMINE HCL IN 0.9 % NACL 50 MG/5 ML
SYRINGE (ML) INTRAVENOUS
Status: DISCONTINUED | OUTPATIENT
Start: 2025-04-03 | End: 2025-04-03

## 2025-04-03 RX ORDER — SODIUM CHLORIDE 0.9 % (FLUSH) 0.9 %
3 SYRINGE (ML) INJECTION
Status: DISCONTINUED | OUTPATIENT
Start: 2025-04-03 | End: 2025-04-03 | Stop reason: HOSPADM

## 2025-04-03 RX ORDER — ACETAMINOPHEN 500 MG
1000 TABLET ORAL
Status: COMPLETED | OUTPATIENT
Start: 2025-04-03 | End: 2025-04-03

## 2025-04-03 RX ORDER — GLUCAGON 1 MG
1 KIT INJECTION
Status: DISCONTINUED | OUTPATIENT
Start: 2025-04-03 | End: 2025-04-03 | Stop reason: HOSPADM

## 2025-04-03 RX ORDER — LIDOCAINE HYDROCHLORIDE 10 MG/ML
1 INJECTION, SOLUTION EPIDURAL; INFILTRATION; INTRACAUDAL; PERINEURAL ONCE
Status: DISCONTINUED | OUTPATIENT
Start: 2025-04-03 | End: 2025-04-03 | Stop reason: HOSPADM

## 2025-04-03 RX ORDER — MUPIROCIN 20 MG/G
OINTMENT TOPICAL
Status: DISCONTINUED
Start: 2025-04-03 | End: 2025-04-03 | Stop reason: HOSPADM

## 2025-04-03 RX ORDER — ACETAMINOPHEN 500 MG
TABLET ORAL
Status: DISCONTINUED
Start: 2025-04-03 | End: 2025-04-03 | Stop reason: HOSPADM

## 2025-04-03 RX ORDER — SCOPOLAMINE 1 MG/3D
PATCH, EXTENDED RELEASE TRANSDERMAL
Status: DISCONTINUED
Start: 2025-04-03 | End: 2025-04-03 | Stop reason: HOSPADM

## 2025-04-03 RX ORDER — CEFAZOLIN 2 G/1
2 INJECTION, POWDER, FOR SOLUTION INTRAMUSCULAR; INTRAVENOUS
Status: COMPLETED | OUTPATIENT
Start: 2025-04-03 | End: 2025-04-03

## 2025-04-03 RX ORDER — KETOROLAC TROMETHAMINE 30 MG/ML
INJECTION, SOLUTION INTRAMUSCULAR; INTRAVENOUS
Status: DISCONTINUED | OUTPATIENT
Start: 2025-04-03 | End: 2025-04-03

## 2025-04-03 RX ORDER — MIDAZOLAM HYDROCHLORIDE 1 MG/ML
INJECTION INTRAMUSCULAR; INTRAVENOUS
Status: DISCONTINUED | OUTPATIENT
Start: 2025-04-03 | End: 2025-04-03

## 2025-04-03 RX ORDER — HYDROCODONE BITARTRATE AND ACETAMINOPHEN 5; 325 MG/1; MG/1
1 TABLET ORAL EVERY 4 HOURS PRN
Status: DISCONTINUED | OUTPATIENT
Start: 2025-04-03 | End: 2025-04-03 | Stop reason: HOSPADM

## 2025-04-03 RX ORDER — PREGABALIN 50 MG/1
CAPSULE ORAL
Status: DISCONTINUED
Start: 2025-04-03 | End: 2025-04-03 | Stop reason: HOSPADM

## 2025-04-03 RX ORDER — PROPOFOL 10 MG/ML
VIAL (ML) INTRAVENOUS
Status: DISCONTINUED | OUTPATIENT
Start: 2025-04-03 | End: 2025-04-03

## 2025-04-03 RX ORDER — FENTANYL CITRATE 50 UG/ML
INJECTION, SOLUTION INTRAMUSCULAR; INTRAVENOUS
Status: DISCONTINUED | OUTPATIENT
Start: 2025-04-03 | End: 2025-04-03

## 2025-04-03 RX ORDER — DEXAMETHASONE SODIUM PHOSPHATE 4 MG/ML
INJECTION, SOLUTION INTRA-ARTICULAR; INTRALESIONAL; INTRAMUSCULAR; INTRAVENOUS; SOFT TISSUE
Status: DISCONTINUED | OUTPATIENT
Start: 2025-04-03 | End: 2025-04-03

## 2025-04-03 RX ORDER — ONDANSETRON HYDROCHLORIDE 2 MG/ML
4 INJECTION, SOLUTION INTRAVENOUS DAILY PRN
Status: DISCONTINUED | OUTPATIENT
Start: 2025-04-03 | End: 2025-04-03 | Stop reason: HOSPADM

## 2025-04-03 RX ORDER — MEPERIDINE HYDROCHLORIDE 50 MG/ML
12.5 INJECTION INTRAMUSCULAR; INTRAVENOUS; SUBCUTANEOUS ONCE AS NEEDED
Status: DISCONTINUED | OUTPATIENT
Start: 2025-04-03 | End: 2025-04-03 | Stop reason: HOSPADM

## 2025-04-03 RX ORDER — PROCHLORPERAZINE EDISYLATE 5 MG/ML
5 INJECTION INTRAMUSCULAR; INTRAVENOUS EVERY 10 MIN PRN
Status: DISCONTINUED | OUTPATIENT
Start: 2025-04-03 | End: 2025-04-03 | Stop reason: HOSPADM

## 2025-04-03 RX ORDER — SODIUM CHLORIDE, SODIUM LACTATE, POTASSIUM CHLORIDE, CALCIUM CHLORIDE 600; 310; 30; 20 MG/100ML; MG/100ML; MG/100ML; MG/100ML
INJECTION, SOLUTION INTRAVENOUS CONTINUOUS
Status: DISCONTINUED | OUTPATIENT
Start: 2025-04-03 | End: 2025-04-03 | Stop reason: HOSPADM

## 2025-04-03 RX ORDER — SODIUM CHLORIDE, SODIUM LACTATE, POTASSIUM CHLORIDE, CALCIUM CHLORIDE 600; 310; 30; 20 MG/100ML; MG/100ML; MG/100ML; MG/100ML
INJECTION, SOLUTION INTRAVENOUS CONTINUOUS PRN
Status: DISCONTINUED | OUTPATIENT
Start: 2025-04-03 | End: 2025-04-03

## 2025-04-03 RX ORDER — FAMOTIDINE 20 MG/1
TABLET, FILM COATED ORAL
Status: DISCONTINUED
Start: 2025-04-03 | End: 2025-04-03 | Stop reason: HOSPADM

## 2025-04-03 RX ORDER — OXYCODONE HYDROCHLORIDE 5 MG/1
5 TABLET ORAL
Status: DISCONTINUED | OUTPATIENT
Start: 2025-04-03 | End: 2025-04-03 | Stop reason: HOSPADM

## 2025-04-03 RX ORDER — LIDOCAINE HYDROCHLORIDE 20 MG/ML
INJECTION INTRAVENOUS
Status: DISCONTINUED | OUTPATIENT
Start: 2025-04-03 | End: 2025-04-03

## 2025-04-03 RX ORDER — FAMOTIDINE 20 MG/1
20 TABLET, FILM COATED ORAL
Status: COMPLETED | OUTPATIENT
Start: 2025-04-03 | End: 2025-04-03

## 2025-04-03 RX ORDER — CELECOXIB 100 MG/1
CAPSULE ORAL
Status: DISCONTINUED
Start: 2025-04-03 | End: 2025-04-03 | Stop reason: HOSPADM

## 2025-04-03 RX ORDER — CELECOXIB 100 MG/1
400 CAPSULE ORAL
Status: COMPLETED | OUTPATIENT
Start: 2025-04-03 | End: 2025-04-03

## 2025-04-03 RX ORDER — HYDROMORPHONE HYDROCHLORIDE 2 MG/ML
0.2 INJECTION, SOLUTION INTRAMUSCULAR; INTRAVENOUS; SUBCUTANEOUS EVERY 5 MIN PRN
Status: DISCONTINUED | OUTPATIENT
Start: 2025-04-03 | End: 2025-04-03 | Stop reason: HOSPADM

## 2025-04-03 RX ORDER — PROCHLORPERAZINE EDISYLATE 5 MG/ML
5 INJECTION INTRAMUSCULAR; INTRAVENOUS EVERY 30 MIN PRN
Status: DISCONTINUED | OUTPATIENT
Start: 2025-04-03 | End: 2025-04-03 | Stop reason: HOSPADM

## 2025-04-03 RX ORDER — BUPIVACAINE HYDROCHLORIDE 2.5 MG/ML
INJECTION, SOLUTION EPIDURAL; INFILTRATION; INTRACAUDAL; PERINEURAL
Status: DISCONTINUED | OUTPATIENT
Start: 2025-04-03 | End: 2025-04-03 | Stop reason: HOSPADM

## 2025-04-03 RX ORDER — LIDOCAINE HYDROCHLORIDE 10 MG/ML
0.5 INJECTION, SOLUTION EPIDURAL; INFILTRATION; INTRACAUDAL; PERINEURAL
Status: DISCONTINUED | OUTPATIENT
Start: 2025-04-03 | End: 2025-04-03 | Stop reason: HOSPADM

## 2025-04-03 RX ORDER — ROCURONIUM BROMIDE 10 MG/ML
INJECTION, SOLUTION INTRAVENOUS
Status: DISCONTINUED | OUTPATIENT
Start: 2025-04-03 | End: 2025-04-03

## 2025-04-03 RX ORDER — SCOPOLAMINE 1 MG/3D
1 PATCH, EXTENDED RELEASE TRANSDERMAL ONCE
Status: DISCONTINUED | OUTPATIENT
Start: 2025-04-03 | End: 2025-04-03 | Stop reason: HOSPADM

## 2025-04-03 RX ORDER — PHENYLEPHRINE HYDROCHLORIDE 10 MG/ML
INJECTION INTRAVENOUS
Status: DISCONTINUED | OUTPATIENT
Start: 2025-04-03 | End: 2025-04-03

## 2025-04-03 RX ORDER — ONDANSETRON HYDROCHLORIDE 2 MG/ML
INJECTION, SOLUTION INTRAVENOUS
Status: DISCONTINUED | OUTPATIENT
Start: 2025-04-03 | End: 2025-04-03

## 2025-04-03 RX ADMIN — FENTANYL CITRATE 25 MCG: 0.05 INJECTION, SOLUTION INTRAMUSCULAR; INTRAVENOUS at 08:04

## 2025-04-03 RX ADMIN — FENTANYL CITRATE 100 MCG: 0.05 INJECTION, SOLUTION INTRAMUSCULAR; INTRAVENOUS at 07:04

## 2025-04-03 RX ADMIN — SODIUM CHLORIDE, SODIUM LACTATE, POTASSIUM CHLORIDE, AND CALCIUM CHLORIDE: .6; .31; .03; .02 INJECTION, SOLUTION INTRAVENOUS at 08:04

## 2025-04-03 RX ADMIN — ACETAMINOPHEN 1000 MG: 500 TABLET ORAL at 06:04

## 2025-04-03 RX ADMIN — SUGAMMADEX 200 MG: 100 INJECTION, SOLUTION INTRAVENOUS at 09:04

## 2025-04-03 RX ADMIN — SCOPOLAMINE 1 PATCH: 1.5 PATCH, EXTENDED RELEASE TRANSDERMAL at 06:04

## 2025-04-03 RX ADMIN — VECURONIUM BROMIDE 2 MG: 10 INJECTION, POWDER, LYOPHILIZED, FOR SOLUTION INTRAVENOUS at 09:04

## 2025-04-03 RX ADMIN — FAMOTIDINE 20 MG: 20 TABLET, FILM COATED ORAL at 06:04

## 2025-04-03 RX ADMIN — SODIUM CHLORIDE, SODIUM LACTATE, POTASSIUM CHLORIDE, AND CALCIUM CHLORIDE: .6; .31; .03; .02 INJECTION, SOLUTION INTRAVENOUS at 06:04

## 2025-04-03 RX ADMIN — ONDANSETRON 4 MG: 2 INJECTION, SOLUTION INTRAMUSCULAR; INTRAVENOUS at 09:04

## 2025-04-03 RX ADMIN — CEFAZOLIN 2 G: 2 INJECTION, POWDER, FOR SOLUTION INTRAMUSCULAR; INTRAVENOUS at 07:04

## 2025-04-03 RX ADMIN — MUPIROCIN: 20 OINTMENT TOPICAL at 06:04

## 2025-04-03 RX ADMIN — CELECOXIB 400 MG: 100 CAPSULE ORAL at 06:04

## 2025-04-03 RX ADMIN — DEXAMETHASONE SODIUM PHOSPHATE 8 MG: 4 INJECTION, SOLUTION INTRA-ARTICULAR; INTRALESIONAL; INTRAMUSCULAR; INTRAVENOUS; SOFT TISSUE at 07:04

## 2025-04-03 RX ADMIN — Medication 15 MG: at 09:04

## 2025-04-03 RX ADMIN — PHENYLEPHRINE HYDROCHLORIDE 200 MCG: 10 INJECTION INTRAVENOUS at 07:04

## 2025-04-03 RX ADMIN — PHENYLEPHRINE HYDROCHLORIDE 100 MCG: 10 INJECTION INTRAVENOUS at 07:04

## 2025-04-03 RX ADMIN — VECURONIUM BROMIDE 1 MG: 10 INJECTION, POWDER, LYOPHILIZED, FOR SOLUTION INTRAVENOUS at 08:04

## 2025-04-03 RX ADMIN — PROPOFOL 160 MG: 10 INJECTION, EMULSION INTRAVENOUS at 07:04

## 2025-04-03 RX ADMIN — LIDOCAINE HYDROCHLORIDE 100 MG: 20 INJECTION, SOLUTION INTRAVENOUS at 07:04

## 2025-04-03 RX ADMIN — PREGABALIN 50 MG: 50 CAPSULE ORAL at 06:04

## 2025-04-03 RX ADMIN — HYDROMORPHONE HYDROCHLORIDE 0.2 MG: 2 INJECTION, SOLUTION INTRAMUSCULAR; INTRAVENOUS; SUBCUTANEOUS at 10:04

## 2025-04-03 RX ADMIN — OXYCODONE 5 MG: 5 TABLET ORAL at 10:04

## 2025-04-03 RX ADMIN — KETOROLAC TROMETHAMINE 30 MG: 30 INJECTION, SOLUTION INTRAMUSCULAR; INTRAVENOUS at 09:04

## 2025-04-03 RX ADMIN — FENTANYL CITRATE 25 MCG: 0.05 INJECTION, SOLUTION INTRAMUSCULAR; INTRAVENOUS at 09:04

## 2025-04-03 RX ADMIN — Medication 25 MG: at 07:04

## 2025-04-03 RX ADMIN — MIDAZOLAM HYDROCHLORIDE 2 MG: 1 INJECTION, SOLUTION INTRAMUSCULAR; INTRAVENOUS at 07:04

## 2025-04-03 RX ADMIN — VECURONIUM BROMIDE 2 MG: 10 INJECTION, POWDER, LYOPHILIZED, FOR SOLUTION INTRAVENOUS at 08:04

## 2025-04-03 RX ADMIN — ONDANSETRON 4 MG: 2 INJECTION, SOLUTION INTRAMUSCULAR; INTRAVENOUS at 07:04

## 2025-04-03 RX ADMIN — VECURONIUM BROMIDE 1 MG: 10 INJECTION, POWDER, LYOPHILIZED, FOR SOLUTION INTRAVENOUS at 07:04

## 2025-04-03 RX ADMIN — ROCURONIUM BROMIDE 50 MG: 10 INJECTION, SOLUTION INTRAVENOUS at 07:04

## 2025-04-03 NOTE — INTERVAL H&P NOTE
The patient has been examined and the H&P has been reviewed:    I concur with the findings and no changes have occurred since H&P was written.    Surgery risks, benefits and alternative options discussed and understood by patient/family.          Active Hospital Problems    Diagnosis  POA    *Abnormal uterine bleeding (AUB) [N93.9]  Unknown    Ovarian cyst, right [N83.201]  Unknown      Resolved Hospital Problems   No resolved problems to display.

## 2025-04-03 NOTE — DISCHARGE SUMMARY
Obstetrics and Gynecology  Discharge Summary                                                             Admission Date: 4/3/2025  Discharge Date: 4/3/2025                                                 Attending: Joy Bennett MD                                                                                     Admission Diagnosis: Irregular periods/menstrual cycles [N92.6]  Abnormal uterine bleeding (AUB) [N93.9]  Right ovarian cyst [N83.201]     Discharge  diagnosis:   Active Problem List with Overview Notes    Diagnosis Date Noted    Abnormal uterine bleeding (AUB) 04/03/2025    Ovarian cyst, right 04/03/2025    Hypertension, essential 02/24/2025    Hyperlipidemia 02/24/2025    Nonintractable headache 02/24/2025    Major depressive disorder 01/09/2025    History of DVT of lower extremity 12/02/2022     -LLE related to tubo-ovarian abscess, 2022.  -seen by Dr. Schneider and Dr. Fish 12/16/2022, considered provoked  -AC for 3-4 months, f/u (ext) US LLE neg 4/2023      Hypokalemia 12/02/2022        Operative Procedure: Total laparoscopic Hysterectomy with left salpingectomy and left cystectomy     Complications: none    Pathology: pending     Hospital Course: Patient admitted to outpatient surgery for above procedure, completed without complication. Patient discharged home in stable condition after recovery.      D/C Labs:    Lab Results   Component Value Date    WBC 6.68 04/03/2025    HGB 12.7 04/03/2025    HCT 39.6 04/03/2025    MCV 80 (L) 04/03/2025     04/03/2025        BMP  Lab Results   Component Value Date     03/31/2025    K 3.9 03/31/2025     03/31/2025    CO2 22 (L) 03/31/2025    BUN 13 03/31/2025    CREATININE 0.7 03/31/2025    CALCIUM 8.8 03/31/2025    ANIONGAP 7 (L) 03/31/2025        Condition at discharge: Stable    Medications:   Discharge Medication List as of 4/3/2025 12:27 PM        CONTINUE these medications which have NOT CHANGED    Details   albuterol (PROVENTIL/VENTOLIN  HFA) 90 mcg/actuation inhaler SMARTSI-2 Puff(s) By Mouth Every 4-6 Hours PRN, Historical Med      calcium carbonate (CALCIUM 600 ORAL) Starting Fri 10/1/2021, Historical Med      docusate sodium (COLACE) 100 MG capsule Take 1 capsule (100 mg total) by mouth daily as needed for Constipation., Starting Wed 3/26/2025, Until Thu 3/26/2026 at 2359, Normal      DULoxetine (CYMBALTA) 20 MG capsule Take 1 capsule (20 mg total) by mouth once daily., Starting 2025, Normal      ferrous fumarate/vit Bcomp,C (SUPER B COMPLEX ORAL) Starting Fri 10/1/2021, Historical Med      ibuprofen (ADVIL,MOTRIN) 800 MG tablet Take 1 tablet (800 mg total) by mouth every 8 (eight) hours as needed for Pain., Starting Wed 3/26/2025, Until Thu 3/26/2026 at 2359, Normal      lisinopriL (PRINIVIL,ZESTRIL) 20 MG tablet Take 1 tablet (20 mg total) by mouth once daily., Starting 2025, Normal      ondansetron (ZOFRAN-ODT) 4 MG TbDL Take 2 tablets (8 mg total) by mouth every 8 (eight) hours as needed (nausea)., Starting Wed 3/26/2025, Normal      oxyCODONE-acetaminophen (PERCOCET) 5-325 mg per tablet Take 1 tablet by mouth every 4 (four) hours as needed for Pain., Starting Wed 3/26/2025, Normal      verapamiL (CALAN) 40 MG Tab Take 1 tablet (40 mg total) by mouth 2 (two) times daily., Starting Mon 3/31/2025, Normal             Discharge Activity: No driving for 2 weeks, no heavy lifting, pushing, pulling, or strenuous activity for 6 weeks, pelvic rest for 6 weeks.    Diet: Regular      Discharge instructions: Notify MD or return to ED for evaluation if persistent fever >100.4, increasing abdominal pain, heavy vaginal bleeding, dizziness, shortness of breath/difficulty breathing      .  Disposition: home    Follow-up: 2 week with Dr. Bennett in clinic         Joy Bennett MD

## 2025-04-03 NOTE — ANESTHESIA POSTPROCEDURE EVALUATION
Anesthesia Post Evaluation    Patient: Amy Womack    Procedure(s) Performed: Procedure(s) (LRB):  HYSTERECTOMY, TOTAL, LAPAROSCOPIC (N/A)  SALPINGECTOMY, LAPAROSCOPIC (Left)  CYSTOSCOPY    Final Anesthesia Type: general      Patient location during evaluation: PACU  Patient participation: Yes- Able to Participate  Level of consciousness: responds to stimulation  Post-procedure vital signs: reviewed and stable  Pain management: adequate  Airway patency: patent  CARLYLE mitigation strategies: Multimodal analgesia  PONV status at discharge: No PONV  Anesthetic complications: no      Cardiovascular status: hemodynamically stable  Respiratory status: spontaneous ventilation and room air  Hydration status: euvolemic  Follow-up not needed.              Vitals Value Taken Time   /66 04/03/25 11:07   Temp 36.6 °C (97.9 °F) 04/03/25 09:55   Pulse 94 04/03/25 11:07   Resp 14 04/03/25 11:07   SpO2 97 % 04/03/25 11:07   Vitals shown include unfiled device data.      Event Time   Out of Recovery 11:11:24         Pain/Lalo Score: Pain Rating Prior to Med Admin: 6 (4/3/2025 11:00 AM)  Pain Rating Post Med Admin: 2 (4/3/2025 11:00 AM)  Lalo Score: 10 (4/3/2025 11:00 AM)

## 2025-04-03 NOTE — DISCHARGE INSTRUCTIONS
ANESTHESIA  -For the first 24 hours after surgery:  Do not drive, use heavy equipment, make important decisions, or drink alcohol  -It is normal to feel sleepy for several hours.  Rest until you are more awake.  -Have someone stay with you, if needed.  They can watch for problems and help keep you safe.  -Some possible post anesthesia side effects include: nausea and vomiting, sore throat and hoarseness, sleepiness, and dizziness.    PAIN  -If you have pain after surgery, pain medicine will help you feel better.  Take it as directed, before pain becomes severe.  Most pain relievers taken by mouth need at least 20-30 minutes to start working.  -Do not drive or drink alcohol while taking pain medicine.  -Pain medication can upset your stomach.  Taking them with a little food may help.  -Other ways to help control pain: elevation, ice, and relaxation  -Call your surgeon if still having unmanageable pain an hour after taking pain medicine.  -Pain medicine can cause constipation.  Taking an over-the counter stool softener while on prescription pain medicine and drinking plenty of fluids can prevent this side effect.  -Call your surgeon if you have severe side effects like: breathing problems, trouble waking up, dizziness, confusion, or severe constipation.    NAUSEA  -Some people have nausea after surgery.  This is often because of anesthesia, pain, pain medicine, or the stress of surgery.  -Do not push yourself to eat.  Start off with clear liquids and soup.  Slowly move to solid foods.  Don't eat fatty, rich, spicy foods at first.  Eat smaller amounts.  -If you develop persistent nausea and vomiting please notify your surgeon immediately.    BLEEDING  -Different types of surgery require different types of care and dressing changes.  It is important to follow all instructions and advice from your surgeon.  Change dressing as directed.  Call your surgeon for any concerns regarding postop bleeding.    SIGNS OF  INFECTION  -Signs of infection include: fever, swelling, drainage, and redness  -Notify your surgeon if you have a fever of 100.4 F (38.0 C) or higher.  -Notify your surgeon if you notice redness, swelling, increased pain, pus, or a foul smell at the incision site.    Notify Dr. Bennett for any questions or concerns.

## 2025-04-03 NOTE — OP NOTE
Operative Note    Patient: Amy Womack   MRN: [id number]  Surgery Date:  4/3/25    Surgeons and Role:     * Joy Bennett MD - Primary    Other OR Staff/Assistants:  Circulator: Leonard Amaro, JAYRO; Hoda Nath RN  Relief Circulator: Lynette Mauricio, RN  Relief Scrub: Dori Mercado, ST  Scrub Person: Sharon Cruz ST  First Assistant: Shanice Bhakta  : Fernando Blandon Assistants Tasks:  Opening and closing, holding camera, retraction, dissecting tissue, and removing or altering tissue     Procedure  Primary Surgeon   HYSTERECTOMY, TOTAL, LAPAROSCOPIC, SALPINGECTOMY, LAPAROSCOPIC, CYSTOSCOPY  Joy Bennett MD   * Panel 2 does not exist *  * Panel 2 does not exist *   * Panel 3 does not exist *  * Panel 3 does not exist *    Total laparoscopic Hysterectomy with left salpingectomy and left cystectomy   2. Cystoscopy      Pre-operative Diagnosis:  Irregular periods/menstrual cycles [N92.6]  Abnormal uterine bleeding (AUB) [N93.9]  Right ovarian cyst [N83.201]    Post-operative Diagnosis: same as preop diagnosis, additional left ovarian cyst     Anesthesia Type: General     Findings:   1. Laparoscopic entry showed no evidence of blunt or sharp trauma  2. Uterus was 8 week size  3. Right Adnexa & Ovarian Fossa: hemorrhagic cyst present with ovary in scar tissue capsule, tube dilated possible hydrosalpinx in the scar tissue capsule, adhesions to posterior uterus and left ovary at midline  4. Left Adnexa & Ovarian Fossa: simple cyst present with ovary in scar tissue capsule, tube dilated possible hydrosalpinx in the scar tissue capsule, adhesions to posterior uterus and right ovary at midline  5. Anterior Cul de Sac WNL  6. Posterior Cul de Sac thick adhesions between uterus and bilateral ovaries (kissing ovaries)        EBL: 250cc  IVF: per anesthesia  UOP: 100ml    Complications: none    Specimens: uterus cervix, left fallopian tube and cyst capsule  Implants: none        Dispo: PACU      Procedure in Detail:    Abdominal Entry  The patient was taken to the operating room where general anesthesia was administered and found to be adequate.  She was placed in the Rodrick stirrups, prepped and draped in the usual sterile fashion.  A uterus was sounded and a lore uterine manipulator was placed in a normal fashion.    A 10 mm umbilical skin incision was made with the scalpel. A Veress needle was inserted into the umbilicus under tenting of the anterior abdominal wall.  Placement into the peritoneal cavity was confirmed via saline drop test.  The abdomen was insufflated to 15mm Hg using Carbon dioxide.  A 10 mm trocar was advanced through this incision.  Excellent hemostasis was noted.  The patient was placed in deep Trendelenburg.  An 5 mm left lower quadrant skin incision was made with a scalpel and a 5 mm trocar was advanced through this incision under visualization of the camera.  A 5 mm right lower quadrant skin incision was made with the scalpel.  A 5 mm trocar was advanced through this incision under visualization of the camera.  Excellent hemostasis was noted.      The abdomen and pelvis were then examined laparoscopically and the above findings were noted.      Left Adnexa  The left fallopian tube and ovary were bluntly dissected off posterior uterus and Ligasure was utilized to separate ovary/capsule of scar tissue from midline/right ovary.  The left round ligament was then transected and the anterior leaf of the broad ligament was the incised to the level of the bladder. The anterior leaf of the broad ligament was then extended parallel to the IP vessels and the left utero-ovarian ligament and vessels were transected. The posterior leaf of the broad ligament was incised toward the medial aspect of the uterosacral ligament exposing the uterine vessels.        Right Adnexa  The right fallopian tube and ovary were bluntly dissected off posterior uterus. Additional scar tissue  transected with the Ligasure, also utilized to separate ovary/capsule of scar tissue from midline.   The right round ligament was then transected and the anterior leaf of the broad ligament was the incised to the level of the bladder. The anterior leaf of the broad ligament was then extended parallel to the IP vessels and the right utero-ovarian ligament and vessels were transected. The posterior leaf of the broad ligament was incised toward the medial aspect of the uterosacral ligament exposing the uterine vessels.         Bladder Flap and Uterine Vessels, & Colpotomy  The bladder was elevated and the vesicouterine space was entered and the bladder flap was then further developed. The uterine vessels were then coagulated and transected, and lateralized to the level of the colpotomy cup bilaterally. The colpotomy incision was then made posteriorly made from 4 o'clock to 8 o'clock. The colpotomy incision was continued laterally on the left then the right side, finishing anteriorly. The uterus was then removed through the vagina.      Vaginal Cuff Closure  The vaginal cuff was then closed with two mattress sutures in both angles of 0 vicryl using the endostitch device. The midline cuff was closed with a mattress suture of 0 vicryl using the endostitch device x2.     Jayson was placed over areas of bleeding on the vaginal cuff and hemostasis observed. Implantation of allograft tissue was completed over the repaired vaginal cuff.    The left and right fallopian tubes were unable to be well delineated due to the scar tissue/capsule present. Left fallopian dilated possible hydrosalpinx. Left tube was grasped and dissected off ovary. The left cyst was adjacent to the left tube, both were transected with the Ligasure off the left ovary. Left tube and cyst capsule removed through the umbilical trocar under direct visualization. The right fallopian tube was unable to be visualized and decision made to leave tube in place to  not disrupt remaining normal ovarian tissue. The right hemorrhagic cyst was opened and drained. Hemostasis observed.     The bladder was then backfilled with approximately 250ml of sterile saline, mercado catheter was removed and a cystoscopy was performed. Good ureteral jets were observed bilaterally and no bladder abnormality noted. Vaginal cuff palpated and no defects noted. The trocars were then removed under direct vision and the 10mm ports site closed with 0-vicryl at the fascia. The skin incisions were then re-approximated with 4-0 monocryl. Sponge, lap, needle and instrument counts were counts were correct x 2.  The patient tolerated the procedure well and was taken to the recovery room awake and in stable condition.        Joy Bennett MD  [facility]

## 2025-04-03 NOTE — TRANSFER OF CARE
"Anesthesia Transfer of Care Note    Patient: Amy Womack    Procedure(s) Performed: Procedure(s) (LRB):  HYSTERECTOMY, TOTAL, LAPAROSCOPIC (N/A)  SALPINGECTOMY, LAPAROSCOPIC (Left)  CYSTOSCOPY    Patient location: PACU    Anesthesia Type: general    Transport from OR: Transported from OR on 6-10 L/min O2 by face mask with adequate spontaneous ventilation    Post pain: adequate analgesia    Post assessment: no apparent anesthetic complications and tolerated procedure well    Post vital signs: stable    Level of consciousness: awake    Nausea/Vomiting: no nausea/vomiting    Complications: none    Transfer of care protocol was followed      Last vitals: Visit Vitals  /75 (BP Location: Right arm, Patient Position: Lying)   Pulse 76   Temp 36.7 °C (98.1 °F) (Tympanic)   Resp 16   Ht 4' 11" (1.499 m)   Wt 68.9 kg (152 lb)   SpO2 97%   Breastfeeding No   BMI 30.70 kg/m²     "

## 2025-04-03 NOTE — ANESTHESIA PROCEDURE NOTES
Peripheral IV Insertion    Diagnosis: I87.9 Disorder of vein, unspecified.    Patient location during procedure: OR    Staffing  Authorizing Provider: Carlo Delaney MD  Performing Provider: Adarsh Ledesma Jr., CRNA    Staffing  Performed by: Adarsh Ledesma Jr., CRNA  Authorized by: Carlo Delaney MD    Anesthesiologist was present at the time of the procedure.  Peripheral IV Insertion  Skin Prep: chlorhexidine gluconate  Orientation: right  Location: hand  Catheter Type: peripheral IV (single lumen)  Catheter Size: 18 G Insertion Attempts: 1  Assessment  Dressing: secured with tape and tegaderm  Line flushed easily.

## 2025-04-04 ENCOUNTER — TELEPHONE (OUTPATIENT)
Facility: CLINIC | Age: 45
End: 2025-04-04
Payer: COMMERCIAL

## 2025-04-04 NOTE — TELEPHONE ENCOUNTER
Called patient to check on her after surgery. She is doing well today, just a little sore. Only needed pain medicine twice so far. No other concerns or questions today, return precautions discussed.

## 2025-04-07 ENCOUNTER — RESULTS FOLLOW-UP (OUTPATIENT)
Facility: CLINIC | Age: 45
End: 2025-04-07

## 2025-04-07 LAB
ESTROGEN SERPL-MCNC: NORMAL PG/ML
INSULIN SERPL-ACNC: NORMAL U[IU]/ML
LAB AP CLINICAL INFORMATION: NORMAL
LAB AP DIAGNOSIS CATEGORY: NORMAL
LAB AP GROSS DESCRIPTION: NORMAL
LAB AP PERFORMING LOCATION(S): NORMAL
LAB AP REPORT FOOTNOTES: NORMAL
T3RU NFR SERPL: NORMAL %

## 2025-04-17 ENCOUNTER — OFFICE VISIT (OUTPATIENT)
Dept: OBSTETRICS AND GYNECOLOGY | Facility: CLINIC | Age: 45
End: 2025-04-17
Payer: COMMERCIAL

## 2025-04-17 VITALS
BODY MASS INDEX: 30.24 KG/M2 | DIASTOLIC BLOOD PRESSURE: 78 MMHG | HEIGHT: 59 IN | SYSTOLIC BLOOD PRESSURE: 119 MMHG | WEIGHT: 150 LBS

## 2025-04-17 DIAGNOSIS — Z90.710 S/P LAPAROSCOPIC HYSTERECTOMY: Primary | ICD-10-CM

## 2025-04-17 PROCEDURE — 3078F DIAST BP <80 MM HG: CPT | Mod: CPTII,S$GLB,, | Performed by: STUDENT IN AN ORGANIZED HEALTH CARE EDUCATION/TRAINING PROGRAM

## 2025-04-17 PROCEDURE — 99999 PR PBB SHADOW E&M-EST. PATIENT-LVL III: CPT | Mod: PBBFAC,,, | Performed by: STUDENT IN AN ORGANIZED HEALTH CARE EDUCATION/TRAINING PROGRAM

## 2025-04-17 PROCEDURE — 99024 POSTOP FOLLOW-UP VISIT: CPT | Mod: S$GLB,,, | Performed by: STUDENT IN AN ORGANIZED HEALTH CARE EDUCATION/TRAINING PROGRAM

## 2025-04-17 PROCEDURE — 4010F ACE/ARB THERAPY RXD/TAKEN: CPT | Mod: CPTII,S$GLB,, | Performed by: STUDENT IN AN ORGANIZED HEALTH CARE EDUCATION/TRAINING PROGRAM

## 2025-04-17 PROCEDURE — 3044F HG A1C LEVEL LT 7.0%: CPT | Mod: CPTII,S$GLB,, | Performed by: STUDENT IN AN ORGANIZED HEALTH CARE EDUCATION/TRAINING PROGRAM

## 2025-04-17 PROCEDURE — 3074F SYST BP LT 130 MM HG: CPT | Mod: CPTII,S$GLB,, | Performed by: STUDENT IN AN ORGANIZED HEALTH CARE EDUCATION/TRAINING PROGRAM

## 2025-04-17 PROCEDURE — 1159F MED LIST DOCD IN RCRD: CPT | Mod: CPTII,S$GLB,, | Performed by: STUDENT IN AN ORGANIZED HEALTH CARE EDUCATION/TRAINING PROGRAM

## 2025-04-17 NOTE — LETTER
April 17, 2025    Amy Womack  [address]  [place] [address]         [facility]  [address]  [address]  [place] [address]  Phone: [phone] April 17, 2025     Patient: Amy Womack   YOB: 1980   Date of Visit: 4/17/2025       To Whom It May Concern:    It is my medical opinion that Amy Womack  will need the following restrictions until complete surgical recovery has been reached (6 weeks post op - May 19th 2025) .    - no lifting >10lbs   - no squatting, bending, stooping   - no running   - no sit ups   - please allow loose fitting clothing (especially if anything is around the abdomen) to prevent issues with wound/incision healing       If you have any questions or concerns, please don't hesitate to call.    Sincerely,        Joy Bennett MD

## 2025-04-17 NOTE — PROGRESS NOTES
"CC: Postoperative visit    Amy Womack is a 45 y.o. female  presents for a postoperative visit s/p Total laparoscopic Hysterectomy with left salpingectomy and left cystectomy cysto on 4/3/25.  Her postoperative course has been uncomplicated.  She is doing well postoperatively.    Pathology showed:  Uterus, cervix and left fallopian tube weighing 125 g showing:   Secretory endometrium with adenomyosis  The cervix shows nabothian cysts  The left fallopian tube has a dilated area    /78 (BP Location: Right arm, Patient Position: Sitting)   Ht 4' 11" (1.499 m)   Wt 68.1 kg (150 lb 0.4 oz)   LMP 2025 (Approximate)   BMI 30.30 kg/m²     ROS:  GENERAL: No fever, chills, fatigability or weight loss.  VULVAR: No pain, no lesions and no itching.  VAGINAL: No relaxation, no itching, no discharge, no abnormal bleeding and no lesions.  ABDOMEN: No abdominal pain. Denies nausea. Denies vomiting. No diarrhea. No constipation  BREAST: Denies pain. No lumps. No discharge.  URINARY: No incontinence, no nocturia, no frequency and no dysuria.  CARDIOVASCULAR: No chest pain. No shortness of breath. No leg cramps.  NEUROLOGICAL: No headaches. No vision changes.    Physical Exam  APPEARANCE: Well nourished, well developed, in no acute distress.  AFFECT: WNL, alert and oriented x 3  SKIN: No acne or hirsutism  ABDOMEN: Soft.  No tenderness or masses.  No hepatosplenomegaly.  No hernias.  INCISIONS: Clean, dry, intact. Well healed.   PELVIC: deferred  EXTREMITIES: No edema.      1. S/P laparoscopic hysterectomy            Reviewed op note/procedure, pathology report with patient. All questions answered  Continue activity restrictions  Follow up for 6 week cuff check      Joy Bennett MD  [facility]     "

## 2025-04-28 ENCOUNTER — OFFICE VISIT (OUTPATIENT)
Dept: INTERNAL MEDICINE | Facility: CLINIC | Age: 45
End: 2025-04-28
Attending: FAMILY MEDICINE
Payer: COMMERCIAL

## 2025-04-28 VITALS
OXYGEN SATURATION: 96 % | HEART RATE: 96 BPM | SYSTOLIC BLOOD PRESSURE: 139 MMHG | HEIGHT: 59 IN | BODY MASS INDEX: 30.62 KG/M2 | DIASTOLIC BLOOD PRESSURE: 89 MMHG | WEIGHT: 151.88 LBS

## 2025-04-28 DIAGNOSIS — R51.9 NONINTRACTABLE HEADACHE, UNSPECIFIED CHRONICITY PATTERN, UNSPECIFIED HEADACHE TYPE: ICD-10-CM

## 2025-04-28 DIAGNOSIS — I10 HYPERTENSION, ESSENTIAL: Primary | ICD-10-CM

## 2025-04-28 PROCEDURE — 1159F MED LIST DOCD IN RCRD: CPT | Mod: CPTII,S$GLB,, | Performed by: FAMILY MEDICINE

## 2025-04-28 PROCEDURE — 3008F BODY MASS INDEX DOCD: CPT | Mod: CPTII,S$GLB,, | Performed by: FAMILY MEDICINE

## 2025-04-28 PROCEDURE — 3079F DIAST BP 80-89 MM HG: CPT | Mod: CPTII,S$GLB,, | Performed by: FAMILY MEDICINE

## 2025-04-28 PROCEDURE — 99213 OFFICE O/P EST LOW 20 MIN: CPT | Mod: S$GLB,,, | Performed by: FAMILY MEDICINE

## 2025-04-28 PROCEDURE — 4010F ACE/ARB THERAPY RXD/TAKEN: CPT | Mod: CPTII,S$GLB,, | Performed by: FAMILY MEDICINE

## 2025-04-28 PROCEDURE — 1160F RVW MEDS BY RX/DR IN RCRD: CPT | Mod: CPTII,S$GLB,, | Performed by: FAMILY MEDICINE

## 2025-04-28 PROCEDURE — 99999 PR PBB SHADOW E&M-EST. PATIENT-LVL IV: CPT | Mod: PBBFAC,,, | Performed by: FAMILY MEDICINE

## 2025-04-28 PROCEDURE — 3075F SYST BP GE 130 - 139MM HG: CPT | Mod: CPTII,S$GLB,, | Performed by: FAMILY MEDICINE

## 2025-04-28 PROCEDURE — 3044F HG A1C LEVEL LT 7.0%: CPT | Mod: CPTII,S$GLB,, | Performed by: FAMILY MEDICINE

## 2025-04-28 RX ORDER — HYDROCHLOROTHIAZIDE 12.5 MG/1
12.5 TABLET ORAL DAILY
Qty: 90 TABLET | Refills: 1 | Status: SHIPPED | OUTPATIENT
Start: 2025-04-28

## 2025-04-28 NOTE — ASSESSMENT & PLAN NOTE
-add HCT    -cont lisinopril (took previously and re-started feb)  -cont verapamil (added march for HA)  -RTC 3 month  -cont digital

## 2025-04-28 NOTE — PROGRESS NOTES
Subjective:       Patient ID: Amy Womack is a 45 y.o. female.    Chief Complaint: Follow-up    Established patient follows up for management of chronic medical illnesses with complaints today. Please see dictation and ROS for interval problems, specific complaints and disease management discussion.    Past, Surgical, Family, Social, Histories; Medications, allergies reviewed and reconciled.  Health maintenance file reviewed and addressed items due. Recent applicable lab, imaging and cardiovascular results reviewed.  Problem list items reviewed and modified or added entries (in the overview section) may not be transcribed into this encounter note due to note writer format.      Blood pressure follow-up.  Borderline high today.  Was restarted on lisinopril and then verapamil added concerning headaches.  Headaches are a bit better at this time.  Has neurology visit pending.        Review of Systems   Constitutional: Negative.  Negative for chills and fever.   HENT: Negative.  Negative for congestion and sore throat.    Respiratory:  Negative for chest tightness and shortness of breath.    Cardiovascular:  Negative for chest pain and palpitations.   Gastrointestinal:  Negative for abdominal pain, constipation and diarrhea.   Genitourinary:  Negative for dysuria and frequency.   Musculoskeletal:  Negative for back pain, joint swelling and neck pain.   Skin: Negative.    Neurological:  Positive for headaches. Negative for dizziness and syncope.   Psychiatric/Behavioral: Negative.         Objective:      Physical Exam  Vitals and nursing note reviewed.   Constitutional:       General: She is not in acute distress.     Appearance: She is well-developed.   Pulmonary:      Effort: Pulmonary effort is normal.   Musculoskeletal:      Cervical back: Neck supple.      Right lower leg: No edema.      Left lower leg: No edema.   Skin:     General: Skin is warm and dry.      Findings: No rash.   Neurological:      Mental Status:  She is alert and oriented to person, place, and time.   Psychiatric:         Behavior: Behavior normal.         Thought Content: Thought content normal.         Judgment: Judgment normal.         Assessment:       1. Hypertension, essential    2. Nonintractable headache, unspecified chronicity pattern, unspecified headache type        Plan:     Medication List with Changes/Refills   New Medications    HYDROCHLOROTHIAZIDE 12.5 MG TAB    Take 1 tablet (12.5 mg total) by mouth once daily.   Current Medications    ALBUTEROL (PROVENTIL/VENTOLIN HFA) 90 MCG/ACTUATION INHALER    SMARTSI-2 Puff(s) By Mouth Every 4-6 Hours PRN    CALCIUM CARBONATE (CALCIUM 600 ORAL)        DOCUSATE SODIUM (COLACE) 100 MG CAPSULE    Take 1 capsule (100 mg total) by mouth daily as needed for Constipation.    DULOXETINE (CYMBALTA) 20 MG CAPSULE    Take 1 capsule (20 mg total) by mouth once daily.    FERROUS FUMARATE/VIT BCOMP,C (SUPER B COMPLEX ORAL)        IBUPROFEN (ADVIL,MOTRIN) 800 MG TABLET    Take 1 tablet (800 mg total) by mouth every 8 (eight) hours as needed for Pain.    LISINOPRIL (PRINIVIL,ZESTRIL) 20 MG TABLET    Take 1 tablet (20 mg total) by mouth once daily.    ONDANSETRON (ZOFRAN-ODT) 4 MG TBDL    Take 2 tablets (8 mg total) by mouth every 8 (eight) hours as needed (nausea).    OXYCODONE-ACETAMINOPHEN (PERCOCET) 5-325 MG PER TABLET    Take 1 tablet by mouth every 4 (four) hours as needed for Pain.    VERAPAMIL (CALAN) 40 MG TAB    Take 1 tablet (40 mg total) by mouth 2 (two) times daily.     1. Hypertension, essential  Assessment & Plan:  -add HCT    -cont lisinopril (took previously and re-started feb)  -cont verapamil (added march for HA)  -RTC 3 month  -cont digital    Orders:  -     hydroCHLOROthiazide 12.5 MG Tab; Take 1 tablet (12.5 mg total) by mouth once daily.  Dispense: 90 tablet; Refill: 1  -     Basic Metabolic Panel; Future; Expected date: 2025    2. Nonintractable headache, unspecified chronicity pattern,  unspecified headache type      See meds, orders, follow up, routing and instructions sections of encounter and AVS. Discussed with patient and provided on AVS.    Discussed diet and exercise as therapeutic modalities for metabolic and other conditions. Provided patient information, which are included as links on the AVS for detailed information.    Lab Results   Component Value Date     03/31/2025     01/09/2025    K 3.9 03/31/2025    K 4.2 01/09/2025     03/31/2025     01/09/2025    BUN 13 03/31/2025    CREATININE 0.7 03/31/2025    GLU 95 01/09/2025    HGBA1C 5.2 01/09/2025    MG 1.6 12/04/2022    AST 17 01/09/2025    ALT 13 01/09/2025    ALBUMIN 4.2 01/09/2025    PROT 7.7 01/09/2025    BILITOT 0.2 01/09/2025    CHOL 231 (H) 01/09/2025    HDL 67 01/09/2025    LDLCALC 147.8 01/09/2025    TRIG 81 01/09/2025    WBC 6.68 04/03/2025    HGB 12.7 04/03/2025    HGB 12.0 01/09/2025    HCT 39.6 04/03/2025    HCT 39.7 01/09/2025     04/03/2025     01/09/2025    TSH 0.713 01/09/2025    BNP 14 12/14/2022

## 2025-06-10 NOTE — PROGRESS NOTES
"New Patient     SUBJECTIVE:  Patient ID: Amy Womack   MRN: [id number]  Referred By: Aaareferral Self  Chief Complaint: No chief complaint on file.      History of Present Illness:   45 y.o. female with headache, MDD, htn, hld, DVT, presents to clinic alone for evaluation of headaches.   PMHx negative for kidney stones, asthma, GI Bleed/ gastric surgery, osteoporosis, CAD/ MI, CVA/ TIA, DM, TBI, cancer    The patient endorses a history of headaches that began 10 years ago. She states that her headaches have been "off and on" since then but have worsened in both intensity and frequency in the past year, especially in the last 3-4 months. She denies any specific life changes during that time, but she notes that she had to move back to [place] 2.5 years ago after  from her significant other. She notes fluctuations in weight and mood during that time.     The patient feels pain in the bilateral parietal and occipital regions. The pain is dull then progresses to sharp and throbbing in nature. The pain can last from 30 minutes with medication to 4 hours in duration. The patient does not note any known trigger to her headaches. The pain is relieved by laying down in a dark room, ice packs and made worse by noise, bright lights, computer screens . The patient states that the pain occurs most often more often during the daytime. Pain ranges from 2 to 9/10 in intensity. The patient currently experiences 15/30 headache days per month with 5 being severe. The patient denies aura.      Associated symptoms (positives in bold):   photophobia, phonophobia, exercise intolerance  osmophobia   Nausea/vomiting  Visual symptoms: blurry vision, double vision, spots, bowman, distorted vision  Dizziness /Vertigo  Confusion  Impaired concentration    Neck pain - notes tension bilaterally   Ringing in the ears    Social History  Alcohol- Socially   Smoke- denies   Recreational Drug Use- denies   Caffeine intake- Has recently " "decreased to 1 cup of coffee per day with an occasional coke zero or energy drink   Sleep - Sleeps well   Eye Exam up to date- yes   Plans for Pregnancy/ breastfeeding- denies, hysterectomy     Family Hx of Migraines mother, sister     ----    Current treatment  Ibuprofen- works for mild Has, not for severe  Excedrin    Medications tried/ response:   Beta Blockers- avoid, hx asthma  Antidepressants- avoid, patient already taking for anxiety    Current Medications:  Current Medications[1]    Review of Systems - as per HPI, otherwise a balanced 10 systems review is negative.    OBJECTIVE:  Vitals:  /85   Pulse 94   Ht 4' 11" (1.499 m)   Wt 66.3 kg (146 lb 4.4 oz)   LMP 03/06/2025 (Approximate)   BMI 29.54 kg/m²     Physical Exam   Constitutional: she appears well-developed and well-nourished. she is well groomed. NAD  HENT:    Head: Normocephalic and atraumatic, Frontalis was NTTP, temporalis was NTTP   Eyes: Conjunctivae and EOM are normal. Pupils are equal, round, and reactive to light   Neck: Neck supple. Occiput and trapezius NTTP   Musculoskeletal: Normal range of motion. No joint stiffness. No vertebral point tenderness.  Skin: Skin is warm and dry.  Psychiatric: Normal mood and affect.     Neuro exam:    Mental status:  The patient is alert and oriented to person, place and time.  Language is intact and fluent  Remote and recent memory are intact  Normal attention and concentration  Mood is stable    Cranial Nerves:  Pupils are equal and reactive to light.    Extraocular movements are intact and without nystagmus.    Facial movement is symmetric.  Facial sensation is intact.    Hearing is intact   FROM of neck in all (6) directions without pain  Shoulder shrug symmetrical.    Coordination:     Finger to nose - normal and symmetric bilaterally     Motor:  Normal muscle bulk and symmetry. No fasciculations were noted.   Tremor not apparent   RUE:appropriate against gravity and medium force as tested " 5/5  LUE: appropriate against gravity and medium force as tested 5/5  RLE:appropriate against gravity and medium force as tested 5/5              LLE: appropriate against gravity and medium force as tested 5/5    Reflexes:  Right Brachioradialis 2+  Left Brachioradialis 2+  Right Patellar2+  Left Patellar 2+                            Sensory:  RUE  intact light touch  LUE intact light touch  RLE intact light touch  LLE intact light touch    Gait:   Normal gait    Review of Data:   Notes from pcp reviewed   Labs:  Admission on 04/03/2025, Discharged on 04/03/2025   Component Date Value Ref Range Status    POC Preg Test, Ur 04/03/2025 Negative  Negative Final     Acceptable 04/03/2025 Yes   Final    Specimen Outdate 04/03/2025 04/06/2025 23:59   Final    Group & Rh 04/03/2025 O POS   Final    Indirect Helen 04/03/2025 NEG   Final    WBC 04/03/2025 6.68  3.90 - 12.70 K/uL Final    RBC 04/03/2025 4.97  4.00 - 5.40 M/uL Final    HGB 04/03/2025 12.7  12.0 - 16.0 gm/dL Final    HCT 04/03/2025 39.6  37.0 - 48.5 % Final    MCV 04/03/2025 80 (L)  82 - 98 fL Final    MCH 04/03/2025 25.6 (L)  27.0 - 31.0 pg Final    MCHC 04/03/2025 32.1  32.0 - 36.0 g/dL Final    RDW 04/03/2025 15.6 (H)  11.5 - 14.5 % Final    Platelet Count 04/03/2025 263  150 - 450 K/uL Final    MPV 04/03/2025 10.5  9.2 - 12.9 fL Final    Nucleated RBC 04/03/2025 0  <=0 /100 WBC Final    Neut % 04/03/2025 55.2  38 - 73 % Final    Lymph % 04/03/2025 30.5  18 - 48 % Final    Mono % 04/03/2025 11.7  4 - 15 % Final    Eos % 04/03/2025 1.6  <=8 % Final    Basophil % 04/03/2025 0.6  <=1.9 % Final    Imm Grans % 04/03/2025 0.4  0.0 - 0.5 % Final    Neut # 04/03/2025 3.68  1.8 - 7.7 K/uL Final    Lymph # 04/03/2025 2.04  1 - 4.8 K/uL Final    Mono # 04/03/2025 0.78  0.3 - 1 K/uL Final    Eos # 04/03/2025 0.11  <=0.5 K/uL Final    Baso # 04/03/2025 0.04  <=0.2 K/uL Final    Imm Grans # 04/03/2025 0.03  0.00 - 0.04 K/uL Final    POCT Glucose 04/03/2025  97  70 - 110 mg/dL Final    ABORH Retype 04/03/2025 O POS   Final    Case Report 04/03/2025    Final                    Value:[facility]                                 Case: [id number]                                Authorizing Provider:  Joy Bennett MD      Collected:           04/03/2025 09:51 AM          Ordering Location:     [facility]           Received:            04/03/2025 10:38 AM                                 (Hospital)                                                                   Pathologist:           Ramon Huynh MD                                                         Specimen:    Uterus, Cervix, Left Fallopian Tube, 1. uterus cervix, and left fallopian tube - perm      Clinical Information 04/03/2025    Final                    Value:Procedure:  HYSTERECTOMY, TOTAL, LAPAROSCOPIC  SALPINGECTOMY, LAPAROSCOPIC - Left  CYSTOSCOPY  Pre-op Diagnosis:  Irregular periods/menstrual cycles [N92.6]  Abnormal uterine bleeding (AUB) [N93.9]  Right ovarian cyst [N83.201]  Post-op Diagnosis:  N92.6 - Irregular periods/menstrual cycles [ICD-10-CM]  N93.9 - Abnormal uterine bleeding (AUB) [ICD-10-CM]  N83.201 - Right ovarian cyst [ICD-10-CM]      Final Diagnosis 04/03/2025    Final                    Value:Uterus, cervix and left fallopian tube weighing 125 g showing:   Secretory endometrium with adenomyosis  The cervix shows nabothian cysts  The left fallopian tube has a dilated area      Microscopic Description 04/03/2025    Final                    Value:A microscopic examination was performed and the diagnosis reflects the findings.          Gross Description 04/03/2025    Final                    Value:1. Uterus, Cervix, Left Fallopian Tube: 1. uterus cervix, and left fallopian tube - perm  MRN # on Epic Label:  [id number]  MRN # on Pathology Label:  [id number]    Received fresh, labeled uterus and cervix a left fallopian tube, is a 125 g, 9 x 6 x 5 cm uterus with attached partial  "segment of fallopian tube and an amputated extra segment of fallopian tube.  The serosa is smooth and gray.  The myometrium measures 2.7 cm in maximal thickness displaying a 0.3 cm ill-defined firm yellowish nodule.    The endometrium is soft and pink measuring less than 0.2 cm in maximal thickness.  The endocervical canal is patent.  The exocervix is white and glistening with scanty pinpoint hemorrhagic areas.      The segment of fallopian tube attached the uterus measures 2.7 cm in length.  The amputated segment of fallopian tube measures 5.5 cm in length.  The serosa is smooth and gray.  The fimbria are edematous and gray.    Representative sections are submitted, sublabels as follow:  [id number]                           [id number] endometrium and nodule  [id number] anterior cervix  [id number] posterior cervix  [id number] segment of fallopian tube attached  [id number] segment of fallopian tube amputated.              Grossing was completed by Shena Patiño on 4/4/2025.        Report Footnotes 04/03/2025    Final                    Value:Unless the case is a "gross only" or additional testing only, the final diagnosis for each specimen is based on a microscopic examination of appropriate tissue sections.      Performing Location 04/03/2025    Final                    Value:Grossing performed at [facility], [address] [place] [address], [place], [address]    Sign out performed at [facility], [address] [place] [id number]        Dx Category 04/03/2025 Benign    Final   Hospital Outpatient Visit on 03/31/2025   Component Date Value Ref Range Status    QRS Duration 03/31/2025 82  ms Final    [facility] QTC Calculation 03/31/2025 421  ms Final   Lab Visit on 03/31/2025   Component Date Value Ref Range Status    Sodium 03/31/2025 138  136 - 145 mmol/L Final    Potassium 03/31/2025 3.9  3.5 - 5.1 mmol/L Final    Chloride 03/31/2025 " 109  95 - 110 mmol/L Final    CO2 03/31/2025 22 (L)  23 - 29 mmol/L Final    Glucose 03/31/2025 93  70 - 110 mg/dL Final    BUN 03/31/2025 13  6 - 20 mg/dL Final    Creatinine 03/31/2025 0.7  0.5 - 1.4 mg/dL Final    Calcium 03/31/2025 8.8  8.7 - 10.5 mg/dL Final    Anion Gap 03/31/2025 7 (L)  8 - 16 mmol/L Final    eGFR 03/31/2025 >60  >60 mL/min/1.73/m2 Final     Imaging:  No results found for this or any previous visit.  Note: I have independently reviewed any/all imaging/labs/tests and agree with the report (s) as documented.  Any discrepancies will be as noted/demarcated by free text.  ON, FNP-C 6/12/2025    ASSESSMENT:  1. Migraine without aura and without status migrainosus, not intractable    2. Nonintractable headache, unspecified chronicity pattern, unspecified headache type    3. Anxiety    4. Hypertension, essential        ----    PLAN:  - Discussed symptoms appear to be consistent with migraine without aura, discussed treatment options and patient agreed with the following plan:  -Prevention: START Topirimate 25mg nightly for migraine prevention  -As-needed treatment: START Maxalt 10mg as needed for migraine. Take at onset of migraine and can repeat in 2 hours if needed. Start with taking 1/2 tablet to assess tolerability of medication,    -Anxiety: continue management with PCP as uncontrolled anxiety can be trigger for HA  -HTN: continue management with PCP and digital medicine as uncontrolled htn can be trigger for migraine  - Start tracking headaches via Migraine Buddy noel on phone or with written headache diary   - RTC in 3 months      Future Considerations:  Qulipta or injectables  Change Maxalt to sumatriptan    Orders Placed This Encounter    topiramate (TOPAMAX) 25 MG tablet    rizatriptan (MAXALT) 10 MG tablet       I have discussed realistic goals of care with patient at length as well as medication options, and need for lifestyle adjustment. I have explained that treatment will take time. We  have agreed that the goal will be to reduce frequency/intensity/quantity of HA, not to be completely HA free. I have explained my non narcotic policy regarding headache treatment.     Discussed potential for teratogenicity with treatment, patient understands if her family planning status should change she will contact office immediately and we will safely adjust medications as needed.     Questions and concerns were sought and answered to the patient's stated verbal satisfaction.  The patient verbalizes understanding and agreement with the above stated treatment plan.     Visit today included increased complexity associated with the care of the episodic problem migraine without aura addressed and managing the longitudinal care of the patient due to the serious and/or complex managed problem(s) .     CC: Lb Coleman MD Olivia [facility]e [facility], FNP-C  [facility]  [phone]    Dr. Ibarra was available during today's encounter.          [1]   Current Outpatient Medications:     albuterol (PROVENTIL/VENTOLIN HFA) 90 mcg/actuation inhaler, SMARTSI-2 Puff(s) By Mouth Every 4-6 Hours PRN, Disp: , Rfl:     calcium carbonate (CALCIUM 600 ORAL), , Disp: , Rfl:     DULoxetine (CYMBALTA) 20 MG capsule, Take 1 capsule (20 mg total) by mouth once daily., Disp: 90 capsule, Rfl: 1    ferrous fumarate/vit Bcomp,C (SUPER B COMPLEX ORAL), , Disp: , Rfl:     hydroCHLOROthiazide 12.5 MG Tab, Take 1 tablet (12.5 mg total) by mouth once daily., Disp: 90 tablet, Rfl: 1    ibuprofen (ADVIL,MOTRIN) 800 MG tablet, Take 1 tablet (800 mg total) by mouth every 8 (eight) hours as needed for Pain., Disp: 60 tablet, Rfl: 2    lisinopriL (PRINIVIL,ZESTRIL) 20 MG tablet, Take 1 tablet (20 mg total) by mouth once daily., Disp: 90 tablet, Rfl: 1    verapamiL (CALAN) 40 MG Tab, Take 1 tablet (40 mg total) by mouth 2 (two) times daily., Disp: 60 tablet, Rfl: 2    docusate sodium (COLACE) 100 MG capsule, Take 1 capsule  (100 mg total) by mouth daily as needed for Constipation. (Patient not taking: Reported on 6/12/2025), Disp: 30 capsule, Rfl: 1    ondansetron (ZOFRAN-ODT) 4 MG TbDL, Take 2 tablets (8 mg total) by mouth every 8 (eight) hours as needed (nausea)., Disp: 15 tablet, Rfl: 0    rizatriptan (MAXALT) 10 MG tablet, 1 tab by mouth as needed  migraine. May repeat every 2 hours for max 3 tabs in 24 hours. Use no more than 10 days per month., Disp: 10 tablet, Rfl: 5    topiramate (TOPAMAX) 25 MG tablet, Take 1 tablet (25 mg total) by mouth every evening., Disp: 30 tablet, Rfl: 5

## 2025-06-12 ENCOUNTER — OFFICE VISIT (OUTPATIENT)
Dept: NEUROLOGY | Facility: CLINIC | Age: 45
End: 2025-06-12
Payer: COMMERCIAL

## 2025-06-12 VITALS
BODY MASS INDEX: 29.48 KG/M2 | WEIGHT: 146.25 LBS | HEART RATE: 94 BPM | DIASTOLIC BLOOD PRESSURE: 85 MMHG | HEIGHT: 59 IN | SYSTOLIC BLOOD PRESSURE: 122 MMHG

## 2025-06-12 DIAGNOSIS — F41.9 ANXIETY: ICD-10-CM

## 2025-06-12 DIAGNOSIS — I10 HYPERTENSION, ESSENTIAL: ICD-10-CM

## 2025-06-12 DIAGNOSIS — G43.009 MIGRAINE WITHOUT AURA AND WITHOUT STATUS MIGRAINOSUS, NOT INTRACTABLE: Primary | ICD-10-CM

## 2025-06-12 DIAGNOSIS — R51.9 NONINTRACTABLE HEADACHE, UNSPECIFIED CHRONICITY PATTERN, UNSPECIFIED HEADACHE TYPE: ICD-10-CM

## 2025-06-12 PROCEDURE — 99999 PR PBB SHADOW E&M-EST. PATIENT-LVL III: CPT | Mod: PBBFAC,,, | Performed by: NURSE PRACTITIONER

## 2025-06-12 RX ORDER — RIZATRIPTAN BENZOATE 10 MG/1
TABLET ORAL
Qty: 10 TABLET | Refills: 5 | Status: SHIPPED | OUTPATIENT
Start: 2025-06-12

## 2025-06-12 RX ORDER — TOPIRAMATE 25 MG/1
25 TABLET, FILM COATED ORAL NIGHTLY
Qty: 30 TABLET | Refills: 5 | Status: SHIPPED | OUTPATIENT
Start: 2025-06-12 | End: 2025-12-09

## 2025-06-23 DIAGNOSIS — I10 HYPERTENSION, ESSENTIAL: ICD-10-CM

## 2025-06-23 NOTE — TELEPHONE ENCOUNTER
No care due was identified.  [facility] Embedded Care Due Messages. Reference number: [id number].   6/23/2025 12:14:20 AM CDT

## 2025-06-23 NOTE — TELEPHONE ENCOUNTER
Refill Routing Note   Medication(s) are not appropriate for processing by [facility] for the following reason(s):        New or recently adjusted medication    [facility] action(s):  Defer               Appointments  past 12m or future 3m with PCP    Date Provider   Last Visit   4/28/2025 Lb Coleman MD   Next Visit   7/28/2025 Lb Coleman MD   ED visits in past 90 days: 0        Note composed:6:19 AM 06/23/2025

## 2025-06-25 RX ORDER — VERAPAMIL HYDROCHLORIDE 40 MG/1
40 TABLET ORAL 2 TIMES DAILY
Qty: 180 TABLET | Refills: 0 | Status: SHIPPED | OUTPATIENT
Start: 2025-06-25

## 2025-07-01 DIAGNOSIS — F32.9 MAJOR DEPRESSIVE DISORDER, REMISSION STATUS UNSPECIFIED, UNSPECIFIED WHETHER RECURRENT: ICD-10-CM

## 2025-07-01 RX ORDER — DULOXETIN HYDROCHLORIDE 20 MG/1
20 CAPSULE, DELAYED RELEASE ORAL
Qty: 90 CAPSULE | Refills: 2 | Status: SHIPPED | OUTPATIENT
Start: 2025-07-01

## 2025-07-01 NOTE — TELEPHONE ENCOUNTER
Refill Decision Note   Amytanner Womack  is requesting a refill authorization.  Brief Assessment and Rationale for Refill:  Approve     Medication Therapy Plan:         Comments:     Note composed:10:39 AM 07/01/2025

## 2025-07-01 NOTE — TELEPHONE ENCOUNTER
No care due was identified.  [facility] Embedded Care Due Messages. Reference number: [id number].   7/01/2025 12:22:36 AM CDT

## 2025-07-25 ENCOUNTER — TELEPHONE (OUTPATIENT)
Dept: INTERNAL MEDICINE | Facility: CLINIC | Age: 45
End: 2025-07-25
Payer: COMMERCIAL
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
## 2025-07-28 ENCOUNTER — OFFICE VISIT (OUTPATIENT)
Dept: INTERNAL MEDICINE | Facility: CLINIC | Age: 45
End: 2025-07-28
Attending: FAMILY MEDICINE
Payer: COMMERCIAL

## 2025-07-28 VITALS
SYSTOLIC BLOOD PRESSURE: 132 MMHG | HEIGHT: 59 IN | DIASTOLIC BLOOD PRESSURE: 89 MMHG | HEART RATE: 87 BPM | BODY MASS INDEX: 28.8 KG/M2 | WEIGHT: 142.88 LBS | OXYGEN SATURATION: 96 %

## 2025-07-28 DIAGNOSIS — I10 HYPERTENSION, ESSENTIAL: Primary | ICD-10-CM

## 2025-07-28 DIAGNOSIS — Z90.710 HISTORY OF HYSTERECTOMY: ICD-10-CM

## 2025-07-28 DIAGNOSIS — R51.9 NONINTRACTABLE HEADACHE, UNSPECIFIED CHRONICITY PATTERN, UNSPECIFIED HEADACHE TYPE: ICD-10-CM

## 2025-07-28 PROCEDURE — G2211 COMPLEX E/M VISIT ADD ON: HCPCS | Mod: S$GLB,,, | Performed by: FAMILY MEDICINE

## 2025-07-28 PROCEDURE — 1160F RVW MEDS BY RX/DR IN RCRD: CPT | Mod: CPTII,S$GLB,, | Performed by: FAMILY MEDICINE

## 2025-07-28 PROCEDURE — 3075F SYST BP GE 130 - 139MM HG: CPT | Mod: CPTII,S$GLB,, | Performed by: FAMILY MEDICINE

## 2025-07-28 PROCEDURE — 99999 PR PBB SHADOW E&M-EST. PATIENT-LVL IV: CPT | Mod: PBBFAC,,, | Performed by: FAMILY MEDICINE

## 2025-07-28 PROCEDURE — 1159F MED LIST DOCD IN RCRD: CPT | Mod: CPTII,S$GLB,, | Performed by: FAMILY MEDICINE

## 2025-07-28 PROCEDURE — 3044F HG A1C LEVEL LT 7.0%: CPT | Mod: CPTII,S$GLB,, | Performed by: FAMILY MEDICINE

## 2025-07-28 PROCEDURE — 99213 OFFICE O/P EST LOW 20 MIN: CPT | Mod: S$GLB,,, | Performed by: FAMILY MEDICINE

## 2025-07-28 PROCEDURE — 4010F ACE/ARB THERAPY RXD/TAKEN: CPT | Mod: CPTII,S$GLB,, | Performed by: FAMILY MEDICINE

## 2025-07-28 PROCEDURE — 3079F DIAST BP 80-89 MM HG: CPT | Mod: CPTII,S$GLB,, | Performed by: FAMILY MEDICINE

## 2025-07-28 PROCEDURE — 3008F BODY MASS INDEX DOCD: CPT | Mod: CPTII,S$GLB,, | Performed by: FAMILY MEDICINE

## 2025-07-28 RX ORDER — LISINOPRIL 20 MG/1
20 TABLET ORAL DAILY
Qty: 90 TABLET | Refills: 1 | Status: SHIPPED | OUTPATIENT
Start: 2025-07-28

## 2025-07-28 RX ORDER — VERAPAMIL HYDROCHLORIDE 40 MG/1
40 TABLET ORAL 2 TIMES DAILY
Qty: 180 TABLET | Refills: 1 | Status: SHIPPED | OUTPATIENT
Start: 2025-07-28

## 2025-07-28 RX ORDER — HYDROCHLOROTHIAZIDE 25 MG/1
25 TABLET ORAL DAILY
Qty: 90 TABLET | Refills: 1 | Status: SHIPPED | OUTPATIENT
Start: 2025-07-28

## 2025-07-28 NOTE — PROGRESS NOTES
Subjective:       Patient ID: Amy Womack is a 45 y.o. female.    Chief Complaint: Follow-up    Established patient follows up for management of chronic medical illnesses with complaints today. Please see dictation and ROS for interval problems, specific complaints and disease management discussion.    Past, Surgical, Family, Social, Histories; Medications, allergies reviewed and reconciled.  Health maintenance file reviewed and addressed items due. Recent applicable lab, imaging and cardiovascular results reviewed.  Problem list items reviewed and modified or added entries (in the overview section) may not be transcribed into this encounter note due to note writer format.      Follow-up for blood pressure.  We reviewed her digital hypertension numbers all of which look good.  Her monthly average was 126/82.  A bit higher than that today in the office.    States she feels much better.  Complaints before were tired and sluggish.  She is working on diet and exercise.  She had a hysterectomy in April and has been cleared for returned to full activities.    Saw neurologist, prescribed Topamax and Maxalt.  She is aware of the Topamax side effects.        Review of Systems   Constitutional:  Negative for chills, diaphoresis and fatigue.   HENT:  Negative for congestion, nosebleeds and tinnitus.    Eyes:  Negative for redness and visual disturbance.   Respiratory:  Negative for cough, chest tightness and shortness of breath.    Cardiovascular:  Negative for chest pain, palpitations and leg swelling.   Gastrointestinal:  Negative for abdominal pain and blood in stool.   Genitourinary:  Negative for decreased urine volume and difficulty urinating.   Musculoskeletal:  Negative for joint swelling, myalgias, neck pain and neck stiffness.   Skin:  Negative for color change and rash.   Neurological:  Negative for dizziness, tremors, syncope, speech difficulty and headaches.   Psychiatric/Behavioral:  Negative for agitation and  dysphoric mood. The patient is not nervous/anxious.        Objective:      Physical Exam  Vitals and nursing note reviewed.   Constitutional:       General: She is not in acute distress.     Appearance: She is well-developed.   Pulmonary:      Effort: Pulmonary effort is normal.   Musculoskeletal:      Cervical back: Neck supple.      Right lower leg: No edema.      Left lower leg: No edema.   Skin:     General: Skin is warm and dry.      Findings: No rash.   Neurological:      Mental Status: She is alert and oriented to person, place, and time.   Psychiatric:         Behavior: Behavior normal.         Thought Content: Thought content normal.         Judgment: Judgment normal.         Assessment:       1. Hypertension, essential    2. Nonintractable headache, unspecified chronicity pattern, unspecified headache type    3. History of hysterectomy        Plan:     Medication List with Changes/Refills   Current Medications    ALBUTEROL (PROVENTIL/VENTOLIN HFA) 90 MCG/ACTUATION INHALER    SMARTSI-2 Puff(s) By Mouth Every 4-6 Hours PRN    CALCIUM CARBONATE (CALCIUM 600 ORAL)        DOCUSATE SODIUM (COLACE) 100 MG CAPSULE    Take 1 capsule (100 mg total) by mouth daily as needed for Constipation.    DULOXETINE (CYMBALTA) 20 MG CAPSULE    TAKE 1 CAPSULE BY MOUTH EVERY DAY    FERROUS FUMARATE/VIT BCOMP,C (SUPER B COMPLEX ORAL)        IBUPROFEN (ADVIL,MOTRIN) 800 MG TABLET    Take 1 tablet (800 mg total) by mouth every 8 (eight) hours as needed for Pain.    RIZATRIPTAN (MAXALT) 10 MG TABLET    1 tab by mouth as needed  migraine. May repeat every 2 hours for max 3 tabs in 24 hours. Use no more than 10 days per month.    TOPIRAMATE (TOPAMAX) 25 MG TABLET    Take 1 tablet (25 mg total) by mouth every evening.   Changed and/or Refilled Medications    Modified Medication Previous Medication    HYDROCHLOROTHIAZIDE (HYDRODIURIL) 25 MG TABLET hydroCHLOROthiazide 12.5 MG Tab       Take 1 tablet (25 mg total) by mouth once daily.     Take 1 tablet (12.5 mg total) by mouth once daily.    LISINOPRIL (PRINIVIL,ZESTRIL) 20 MG TABLET lisinopriL (PRINIVIL,ZESTRIL) 20 MG tablet       Take 1 tablet (20 mg total) by mouth once daily.    Take 1 tablet (20 mg total) by mouth once daily.    VERAPAMIL (CALAN) 40 MG TAB verapamiL (CALAN) 40 MG Tab       Take 1 tablet (40 mg total) by mouth 2 (two) times daily.    TAKE 1 TABLET BY MOUTH TWICE A DAY     1. Hypertension, essential  Overview:  Digital HTN    Assessment & Plan:  -better  -cont lisinopril (took previously and re-started feb)  -cont verapamil (added march for HA)  -incr HCT to 25 mg    -renal panel 6 weeks    -RTC 3 month  -cont digital    Orders:  -     Renal Function Panel; Future; Expected date: 07/28/2025  -     lisinopriL (PRINIVIL,ZESTRIL) 20 MG tablet; Take 1 tablet (20 mg total) by mouth once daily.  Dispense: 90 tablet; Refill: 1  -     verapamiL (CALAN) 40 MG Tab; Take 1 tablet (40 mg total) by mouth 2 (two) times daily.  Dispense: 180 tablet; Refill: 1  -     hydroCHLOROthiazide (HYDRODIURIL) 25 MG tablet; Take 1 tablet (25 mg total) by mouth once daily.  Dispense: 90 tablet; Refill: 1    2. Nonintractable headache, unspecified chronicity pattern, unspecified headache type  Assessment & Plan:  -neurology 6/12 add  Topamax  maxalt      3. History of hysterectomy  Overview:  -April 2025 non-cancer    Assessment & Plan:  s/p Total laparoscopic Hysterectomy with left salpingectomy and left cystectomy cysto on 4/3/25     Pathology showed:  Uterus, cervix and left fallopian tube weighing 125 g showing:   Secretory endometrium with adenomyosis  The cervix shows nabothian cysts  The left fallopian tube has a dilated area        See meds, orders, follow up, routing and instructions sections of encounter and AVS. Discussed with patient and provided on AVS.    Discussed diet and exercise as therapeutic modalities for metabolic and other conditions. Provided patient information, which are included  as links on the AVS for detailed information.    Lab Results   Component Value Date     03/31/2025     01/09/2025    K 3.9 03/31/2025    K 4.2 01/09/2025     03/31/2025     01/09/2025    BUN 13 03/31/2025    CREATININE 0.7 03/31/2025    GLU 93 03/31/2025    GLU 95 01/09/2025    HGBA1C 5.2 01/09/2025    MG 1.6 12/04/2022    AST 17 01/09/2025    ALT 13 01/09/2025    ALBUMIN 4.2 01/09/2025    PROT 7.7 01/09/2025    BILITOT 0.2 01/09/2025    CHOL 231 (H) 01/09/2025    HDL 67 01/09/2025    LDLCALC 147.8 01/09/2025    TRIG 81 01/09/2025    WBC 6.68 04/03/2025    HGB 12.7 04/03/2025    HGB 12.0 01/09/2025    HCT 39.6 04/03/2025    HCT 39.7 01/09/2025     04/03/2025     01/09/2025    TSH 0.713 01/09/2025    BNP 14 12/14/2022         As this patient's primary care physician, I am actively managing and/or treating his/her chronic medical conditions now and in the future. This includes, but is not limited to, medication management, coordination of care, documentation review from his/her specialists, and labs/imaging review that I have performed to prepare for this visit as well as will do so in the future as part of my care continuity for this patient.

## 2025-07-28 NOTE — ASSESSMENT & PLAN NOTE
-better  -cont lisinopril (took previously and re-started feb)  -cont verapamil (added march for HA)  -incr HCT to 25 mg    -renal panel 6 weeks    -RTC 3 month  -cont digital

## 2025-07-28 NOTE — ASSESSMENT & PLAN NOTE
s/p Total laparoscopic Hysterectomy with left salpingectomy and left cystectomy cysto on 4/3/25     Pathology showed:  Uterus, cervix and left fallopian tube weighing 125 g showing:   Secretory endometrium with adenomyosis  The cervix shows nabothian cysts  The left fallopian tube has a dilated area

## (undated) DEVICE — SEALER LIGASURE LAP 37CM 5MM

## (undated) DEVICE — Device

## (undated) DEVICE — KIT WING PAD POSITIONING

## (undated) DEVICE — DRESSING TEGADERM 2 3/8 X 2.75

## (undated) DEVICE — SUT VICRYL+ 27 UR-6 VIOL

## (undated) DEVICE — SCISSOR 5MMX35CM DIRECT DRIVE

## (undated) DEVICE — ENDOSTITCH POLYSORB 0 ES-9

## (undated) DEVICE — DRESSING AQUACEL SACRAL 8 X 7

## (undated) DEVICE — SUT MONOCRYL 3-0 PS-2 UND

## (undated) DEVICE — PENCIL ROCKER SWITCH 10FT CORD

## (undated) DEVICE — SUT MONO 2-0 CT-1 UNDYED

## (undated) DEVICE — DRAPE UNDERBUTTOCKS PCH STRL

## (undated) DEVICE — SYR 10CC LUER LOCK

## (undated) DEVICE — COVER OVERHEAD SURG LT BLUE

## (undated) DEVICE — SYR 50CC LL

## (undated) DEVICE — ELECTRODE REM PLYHSV RETURN 9

## (undated) DEVICE — TRAY CATH 1-LYR URIMTR 16FR

## (undated) DEVICE — TROCAR KII FIOS 5MM X 100MM

## (undated) DEVICE — GOWN POLY REINF BRTH SLV XL

## (undated) DEVICE — APPLICATOR ARISTA FLEX XL

## (undated) DEVICE — SUT VICRYL PLUS 0 CT1 36IN

## (undated) DEVICE — GAUZE WOVEN STRL 8PLY 2X2IN

## (undated) DEVICE — MANIFOLD 4 PORT

## (undated) DEVICE — TIP RUMI BLUE DISPOSABLE 5/BX

## (undated) DEVICE — ENDOSTITCH INSTRUMENT

## (undated) DEVICE — NDL HYPO REG 25G X 1 1/2

## (undated) DEVICE — POWDER ARISTA AH 3G

## (undated) DEVICE — TOWEL OR XRAY BLUE 17X26IN

## (undated) DEVICE — SEE MEDLINE ITEM 154981

## (undated) DEVICE — OCCLUDER COLPO-PNEUMO STERILE

## (undated) DEVICE — GLOVE SURGICAL LATEX SZ 6.5

## (undated) DEVICE — DRAPE LEGGINGS CUFF 31X48IN

## (undated) DEVICE — CONTAINERS 32OZ

## (undated) DEVICE — NDL INSUF ULTRA VERESS 120MM

## (undated) DEVICE — IRRIGATOR ENDOSCOPY DISP.

## (undated) DEVICE — GOWN POLY REINF BRTH SLV LG

## (undated) DEVICE — PANTIES FEMININE NAPKIN LG/XLG

## (undated) DEVICE — TROCAR KII FIOS 11MM X 100MM